# Patient Record
Sex: FEMALE | Race: WHITE | Employment: OTHER | URBAN - METROPOLITAN AREA
[De-identification: names, ages, dates, MRNs, and addresses within clinical notes are randomized per-mention and may not be internally consistent; named-entity substitution may affect disease eponyms.]

---

## 2020-07-08 ENCOUNTER — HOSPITAL ENCOUNTER (OUTPATIENT)
Dept: MRI IMAGING | Age: 75
Discharge: HOME | End: 2020-07-08
Attending: UROLOGY
Payer: MEDICARE

## 2020-07-08 VITALS — WEIGHT: 175 LBS

## 2020-07-08 DIAGNOSIS — N28.89 RENAL MASS: ICD-10-CM

## 2020-07-08 PROCEDURE — 74011250636 HC RX REV CODE- 250/636: Performed by: RADIOLOGY

## 2020-07-08 PROCEDURE — 74183 MRI ABD W/O CNTR FLWD CNTR: CPT

## 2020-07-08 PROCEDURE — A9585 GADOBUTROL INJECTION: HCPCS | Performed by: RADIOLOGY

## 2020-07-08 PROCEDURE — 77030021566

## 2020-07-08 RX ORDER — GADOTERATE MEGLUMINE 376.9 MG/ML
15 INJECTION INTRAVENOUS
Status: DISCONTINUED | OUTPATIENT
Start: 2020-07-08 | End: 2020-07-08

## 2020-07-08 RX ADMIN — GADOBUTROL 7 ML: 604.72 INJECTION INTRAVENOUS at 13:30

## 2020-07-09 PROCEDURE — 77030021566

## 2024-08-24 ENCOUNTER — APPOINTMENT (OUTPATIENT)
Facility: HOSPITAL | Age: 79
End: 2024-08-24
Payer: MEDICARE

## 2024-08-24 ENCOUNTER — HOSPITAL ENCOUNTER (INPATIENT)
Facility: HOSPITAL | Age: 79
LOS: 13 days | Discharge: NURSING, REHAB OR LONG-TERM CARE | End: 2024-09-06
Attending: EMERGENCY MEDICINE | Admitting: HOSPITALIST
Payer: MEDICARE

## 2024-08-24 DIAGNOSIS — I48.92 ATRIAL FLUTTER, UNSPECIFIED TYPE (HCC): ICD-10-CM

## 2024-08-24 DIAGNOSIS — S72.002A CLOSED FRACTURE OF NECK OF LEFT FEMUR, INITIAL ENCOUNTER (HCC): Primary | ICD-10-CM

## 2024-08-24 DIAGNOSIS — I48.0 PAROXYSMAL ATRIAL FIBRILLATION (HCC): ICD-10-CM

## 2024-08-24 LAB
ANION GAP SERPL CALC-SCNC: 7 MMOL/L (ref 5–15)
BASOPHILS # BLD: 0 K/UL (ref 0–0.1)
BASOPHILS NFR BLD: 0 % (ref 0–1)
BUN SERPL-MCNC: 22 MG/DL (ref 6–20)
BUN/CREAT SERPL: 21 (ref 12–20)
CALCIUM SERPL-MCNC: 10 MG/DL (ref 8.5–10.1)
CHLORIDE SERPL-SCNC: 106 MMOL/L (ref 97–108)
CO2 SERPL-SCNC: 24 MMOL/L (ref 21–32)
CREAT SERPL-MCNC: 1.04 MG/DL (ref 0.55–1.02)
DIFFERENTIAL METHOD BLD: ABNORMAL
EOSINOPHIL # BLD: 0.1 K/UL (ref 0–0.4)
EOSINOPHIL NFR BLD: 1 % (ref 0–7)
ERYTHROCYTE [DISTWIDTH] IN BLOOD BY AUTOMATED COUNT: 13.2 % (ref 11.5–14.5)
GLUCOSE SERPL-MCNC: 230 MG/DL (ref 65–100)
HCT VFR BLD AUTO: 41.8 % (ref 35–47)
HGB BLD-MCNC: 13.6 G/DL (ref 11.5–16)
IMM GRANULOCYTES # BLD AUTO: 0.1 K/UL (ref 0–0.04)
IMM GRANULOCYTES NFR BLD AUTO: 1 % (ref 0–0.5)
LYMPHOCYTES # BLD: 0.8 K/UL (ref 0.8–3.5)
LYMPHOCYTES NFR BLD: 6 % (ref 12–49)
MCH RBC QN AUTO: 30.7 PG (ref 26–34)
MCHC RBC AUTO-ENTMCNC: 32.5 G/DL (ref 30–36.5)
MCV RBC AUTO: 94.4 FL (ref 80–99)
MONOCYTES # BLD: 0.7 K/UL (ref 0–1)
MONOCYTES NFR BLD: 5 % (ref 5–13)
NEUTS SEG # BLD: 11.6 K/UL (ref 1.8–8)
NEUTS SEG NFR BLD: 87 % (ref 32–75)
NRBC # BLD: 0 K/UL (ref 0–0.01)
NRBC BLD-RTO: 0 PER 100 WBC
PLATELET # BLD AUTO: 229 K/UL (ref 150–400)
PMV BLD AUTO: 12.1 FL (ref 8.9–12.9)
POTASSIUM SERPL-SCNC: 4.5 MMOL/L (ref 3.5–5.1)
RBC # BLD AUTO: 4.43 M/UL (ref 3.8–5.2)
RBC MORPH BLD: ABNORMAL
SODIUM SERPL-SCNC: 137 MMOL/L (ref 136–145)
WBC # BLD AUTO: 13.3 K/UL (ref 3.6–11)

## 2024-08-24 PROCEDURE — 1100000000 HC RM PRIVATE

## 2024-08-24 PROCEDURE — 71045 X-RAY EXAM CHEST 1 VIEW: CPT

## 2024-08-24 PROCEDURE — APPNB30 APP NON BILLABLE TIME 0-30 MINS: Performed by: PHYSICIAN ASSISTANT

## 2024-08-24 PROCEDURE — 86901 BLOOD TYPING SEROLOGIC RH(D): CPT

## 2024-08-24 PROCEDURE — 99285 EMERGENCY DEPT VISIT HI MDM: CPT

## 2024-08-24 PROCEDURE — 86900 BLOOD TYPING SEROLOGIC ABO: CPT

## 2024-08-24 PROCEDURE — 6370000000 HC RX 637 (ALT 250 FOR IP): Performed by: EMERGENCY MEDICINE

## 2024-08-24 PROCEDURE — 2580000003 HC RX 258: Performed by: EMERGENCY MEDICINE

## 2024-08-24 PROCEDURE — 73502 X-RAY EXAM HIP UNI 2-3 VIEWS: CPT

## 2024-08-24 PROCEDURE — 85025 COMPLETE CBC W/AUTO DIFF WBC: CPT

## 2024-08-24 PROCEDURE — 73552 X-RAY EXAM OF FEMUR 2/>: CPT

## 2024-08-24 PROCEDURE — 36415 COLL VENOUS BLD VENIPUNCTURE: CPT

## 2024-08-24 PROCEDURE — 80048 BASIC METABOLIC PNL TOTAL CA: CPT

## 2024-08-24 PROCEDURE — 86850 RBC ANTIBODY SCREEN: CPT

## 2024-08-24 RX ORDER — POLYETHYLENE GLYCOL 3350 17 G/17G
17 POWDER, FOR SOLUTION ORAL DAILY PRN
Status: DISCONTINUED | OUTPATIENT
Start: 2024-08-24 | End: 2024-09-06 | Stop reason: HOSPADM

## 2024-08-24 RX ORDER — ONDANSETRON 2 MG/ML
4 INJECTION INTRAMUSCULAR; INTRAVENOUS EVERY 6 HOURS PRN
Status: DISCONTINUED | OUTPATIENT
Start: 2024-08-24 | End: 2024-08-28

## 2024-08-24 RX ORDER — SODIUM CHLORIDE 9 MG/ML
INJECTION, SOLUTION INTRAVENOUS PRN
Status: DISCONTINUED | OUTPATIENT
Start: 2024-08-24 | End: 2024-09-06 | Stop reason: HOSPADM

## 2024-08-24 RX ORDER — SODIUM CHLORIDE 0.9 % (FLUSH) 0.9 %
5-40 SYRINGE (ML) INJECTION EVERY 12 HOURS SCHEDULED
Status: DISCONTINUED | OUTPATIENT
Start: 2024-08-24 | End: 2024-09-06 | Stop reason: HOSPADM

## 2024-08-24 RX ORDER — MAGNESIUM SULFATE IN WATER 40 MG/ML
2000 INJECTION, SOLUTION INTRAVENOUS PRN
Status: DISCONTINUED | OUTPATIENT
Start: 2024-08-24 | End: 2024-08-30

## 2024-08-24 RX ORDER — OXYCODONE HYDROCHLORIDE 5 MG/1
5 TABLET ORAL EVERY 4 HOURS PRN
Status: DISCONTINUED | OUTPATIENT
Start: 2024-08-24 | End: 2024-08-25

## 2024-08-24 RX ORDER — ONDANSETRON 4 MG/1
4 TABLET, ORALLY DISINTEGRATING ORAL EVERY 8 HOURS PRN
Status: DISCONTINUED | OUTPATIENT
Start: 2024-08-24 | End: 2024-08-28

## 2024-08-24 RX ORDER — OXYCODONE HYDROCHLORIDE 10 MG/1
10 TABLET ORAL EVERY 4 HOURS PRN
Status: DISCONTINUED | OUTPATIENT
Start: 2024-08-24 | End: 2024-08-25

## 2024-08-24 RX ORDER — POTASSIUM CHLORIDE 7.45 MG/ML
10 INJECTION INTRAVENOUS PRN
Status: DISCONTINUED | OUTPATIENT
Start: 2024-08-24 | End: 2024-08-30

## 2024-08-24 RX ORDER — HYDROCODONE BITARTRATE AND ACETAMINOPHEN 5; 325 MG/1; MG/1
1 TABLET ORAL
Status: COMPLETED | OUTPATIENT
Start: 2024-08-24 | End: 2024-08-24

## 2024-08-24 RX ORDER — 0.9 % SODIUM CHLORIDE 0.9 %
1000 INTRAVENOUS SOLUTION INTRAVENOUS ONCE
Status: COMPLETED | OUTPATIENT
Start: 2024-08-24 | End: 2024-08-25

## 2024-08-24 RX ORDER — POTASSIUM CHLORIDE 750 MG/1
40 TABLET, EXTENDED RELEASE ORAL PRN
Status: DISCONTINUED | OUTPATIENT
Start: 2024-08-24 | End: 2024-08-30

## 2024-08-24 RX ORDER — SODIUM CHLORIDE 0.9 % (FLUSH) 0.9 %
5-40 SYRINGE (ML) INJECTION PRN
Status: DISCONTINUED | OUTPATIENT
Start: 2024-08-24 | End: 2024-09-06 | Stop reason: HOSPADM

## 2024-08-24 RX ADMIN — SODIUM CHLORIDE 1000 ML: 9 INJECTION, SOLUTION INTRAVENOUS at 23:48

## 2024-08-24 RX ADMIN — HYDROCODONE BITARTRATE AND ACETAMINOPHEN 1 TABLET: 5; 325 TABLET ORAL at 20:32

## 2024-08-24 ASSESSMENT — PAIN SCALES - GENERAL: PAINLEVEL_OUTOF10: 4

## 2024-08-24 ASSESSMENT — PAIN DESCRIPTION - DESCRIPTORS: DESCRIPTORS: ACHING

## 2024-08-24 ASSESSMENT — PAIN - FUNCTIONAL ASSESSMENT: PAIN_FUNCTIONAL_ASSESSMENT: 0-10

## 2024-08-24 ASSESSMENT — PAIN DESCRIPTION - LOCATION: LOCATION: HIP

## 2024-08-24 ASSESSMENT — PAIN DESCRIPTION - ORIENTATION: ORIENTATION: LEFT

## 2024-08-24 NOTE — ED TRIAGE NOTES
Pt in via ems due to having a fall at the grocery store that occurred about an hour ago. She denies losing consciousness or hitting her head. Pt does taking  endorse taking eliquis for a-fib.    Complains of left hip pain

## 2024-08-25 ENCOUNTER — APPOINTMENT (OUTPATIENT)
Facility: HOSPITAL | Age: 79
End: 2024-08-25
Attending: HOSPITALIST
Payer: MEDICARE

## 2024-08-25 LAB
ABO + RH BLD: NORMAL
APPEARANCE UR: ABNORMAL
BACTERIA URNS QL MICRO: NEGATIVE /HPF
BILIRUB UR QL: NEGATIVE
BLOOD GROUP ANTIBODIES SERPL: NORMAL
COLOR UR: ABNORMAL
EKG ATRIAL RATE: 97 BPM
EKG DIAGNOSIS: NORMAL
EKG P AXIS: 43 DEGREES
EKG P-R INTERVAL: 154 MS
EKG Q-T INTERVAL: 380 MS
EKG QRS DURATION: 82 MS
EKG QTC CALCULATION (BAZETT): 482 MS
EKG R AXIS: 10 DEGREES
EKG T AXIS: 39 DEGREES
EKG VENTRICULAR RATE: 97 BPM
EPITH CASTS URNS QL MICRO: ABNORMAL /LPF
EST. AVERAGE GLUCOSE BLD GHB EST-MCNC: 137 MG/DL
GLUCOSE BLD STRIP.AUTO-MCNC: 122 MG/DL (ref 65–117)
GLUCOSE BLD STRIP.AUTO-MCNC: 142 MG/DL (ref 65–117)
GLUCOSE BLD STRIP.AUTO-MCNC: 145 MG/DL (ref 65–117)
GLUCOSE BLD STRIP.AUTO-MCNC: 174 MG/DL (ref 65–117)
GLUCOSE UR STRIP.AUTO-MCNC: >1000 MG/DL
HBA1C MFR BLD: 6.4 % (ref 4–5.6)
HGB UR QL STRIP: ABNORMAL
HYALINE CASTS URNS QL MICRO: ABNORMAL /LPF (ref 0–2)
KETONES UR QL STRIP.AUTO: NEGATIVE MG/DL
LEUKOCYTE ESTERASE UR QL STRIP.AUTO: NEGATIVE
NITRITE UR QL STRIP.AUTO: NEGATIVE
PH UR STRIP: 7.5 (ref 5–8)
PROT UR STRIP-MCNC: 100 MG/DL
RBC #/AREA URNS HPF: ABNORMAL /HPF (ref 0–5)
SERVICE CMNT-IMP: ABNORMAL
SP GR UR REFRACTOMETRY: 1.02 (ref 1–1.03)
SPECIMEN EXP DATE BLD: NORMAL
URINE CULTURE IF INDICATED: ABNORMAL
UROBILINOGEN UR QL STRIP.AUTO: 0.2 EU/DL (ref 0.2–1)
WBC URNS QL MICRO: ABNORMAL /HPF (ref 0–4)

## 2024-08-25 PROCEDURE — 81001 URINALYSIS AUTO W/SCOPE: CPT

## 2024-08-25 PROCEDURE — 6370000000 HC RX 637 (ALT 250 FOR IP): Performed by: HOSPITALIST

## 2024-08-25 PROCEDURE — 93306 TTE W/DOPPLER COMPLETE: CPT

## 2024-08-25 PROCEDURE — 99223 1ST HOSP IP/OBS HIGH 75: CPT | Performed by: INTERNAL MEDICINE

## 2024-08-25 PROCEDURE — 94761 N-INVAS EAR/PLS OXIMETRY MLT: CPT

## 2024-08-25 PROCEDURE — 2580000003 HC RX 258: Performed by: HOSPITALIST

## 2024-08-25 PROCEDURE — 1100000000 HC RM PRIVATE

## 2024-08-25 PROCEDURE — 36415 COLL VENOUS BLD VENIPUNCTURE: CPT

## 2024-08-25 PROCEDURE — 93010 ELECTROCARDIOGRAM REPORT: CPT | Performed by: INTERNAL MEDICINE

## 2024-08-25 PROCEDURE — 93005 ELECTROCARDIOGRAM TRACING: CPT | Performed by: PHYSICIAN ASSISTANT

## 2024-08-25 PROCEDURE — 83036 HEMOGLOBIN GLYCOSYLATED A1C: CPT

## 2024-08-25 PROCEDURE — 82962 GLUCOSE BLOOD TEST: CPT

## 2024-08-25 PROCEDURE — 6360000002 HC RX W HCPCS: Performed by: PHYSICIAN ASSISTANT

## 2024-08-25 PROCEDURE — 2580000003 HC RX 258: Performed by: PHYSICIAN ASSISTANT

## 2024-08-25 RX ORDER — DILTIAZEM HYDROCHLORIDE 240 MG/1
240 CAPSULE, COATED, EXTENDED RELEASE ORAL DAILY
Status: DISCONTINUED | OUTPATIENT
Start: 2024-08-25 | End: 2024-08-25

## 2024-08-25 RX ORDER — AMLODIPINE BESYLATE 5 MG/1
5 TABLET ORAL DAILY
Status: DISCONTINUED | OUTPATIENT
Start: 2024-08-25 | End: 2024-08-29

## 2024-08-25 RX ORDER — FLECAINIDE ACETATE 50 MG/1
50 TABLET ORAL 2 TIMES DAILY
Status: DISCONTINUED | OUTPATIENT
Start: 2024-08-25 | End: 2024-08-30

## 2024-08-25 RX ORDER — SODIUM CHLORIDE 9 MG/ML
INJECTION, SOLUTION INTRAVENOUS CONTINUOUS
Status: DISPENSED | OUTPATIENT
Start: 2024-08-25 | End: 2024-08-26

## 2024-08-25 RX ORDER — HYDROCODONE BITARTRATE AND ACETAMINOPHEN 5; 325 MG/1; MG/1
1 TABLET ORAL EVERY 6 HOURS PRN
Status: DISCONTINUED | OUTPATIENT
Start: 2024-08-25 | End: 2024-08-25

## 2024-08-25 RX ORDER — LISINOPRIL 20 MG/1
40 TABLET ORAL DAILY
Status: DISCONTINUED | OUTPATIENT
Start: 2024-08-25 | End: 2024-08-30

## 2024-08-25 RX ORDER — DILTIAZEM HYDROCHLORIDE 60 MG/1
240 CAPSULE, EXTENDED RELEASE ORAL DAILY
Status: DISCONTINUED | OUTPATIENT
Start: 2024-08-25 | End: 2024-08-25

## 2024-08-25 RX ORDER — INSULIN GLARGINE 100 [IU]/ML
0.15 INJECTION, SOLUTION SUBCUTANEOUS DAILY
Status: DISCONTINUED | OUTPATIENT
Start: 2024-08-25 | End: 2024-08-27

## 2024-08-25 RX ORDER — DEXTROSE MONOHYDRATE 100 MG/ML
INJECTION, SOLUTION INTRAVENOUS CONTINUOUS PRN
Status: DISCONTINUED | OUTPATIENT
Start: 2024-08-25 | End: 2024-09-06 | Stop reason: HOSPADM

## 2024-08-25 RX ORDER — AMLODIPINE BESYLATE 5 MG/1
5 TABLET ORAL DAILY
Status: ON HOLD | COMMUNITY
End: 2024-09-06 | Stop reason: HOSPADM

## 2024-08-25 RX ORDER — OXYCODONE HYDROCHLORIDE 5 MG/1
5 TABLET ORAL EVERY 6 HOURS PRN
Status: DISCONTINUED | OUTPATIENT
Start: 2024-08-25 | End: 2024-08-28

## 2024-08-25 RX ORDER — FLECAINIDE ACETATE 50 MG/1
50 TABLET ORAL 2 TIMES DAILY
Status: ON HOLD | COMMUNITY
End: 2024-09-06 | Stop reason: HOSPADM

## 2024-08-25 RX ORDER — LISINOPRIL 40 MG/1
40 TABLET ORAL DAILY
Status: ON HOLD | COMMUNITY
End: 2024-09-06 | Stop reason: HOSPADM

## 2024-08-25 RX ORDER — OXYCODONE HYDROCHLORIDE 5 MG/1
10 TABLET ORAL EVERY 6 HOURS PRN
Status: DISCONTINUED | OUTPATIENT
Start: 2024-08-25 | End: 2024-08-28

## 2024-08-25 RX ORDER — DILTIAZEM HYDROCHLORIDE 240 MG/1
240 CAPSULE, EXTENDED RELEASE ORAL DAILY
Status: ON HOLD | COMMUNITY
End: 2024-09-06 | Stop reason: HOSPADM

## 2024-08-25 RX ORDER — DILTIAZEM HYDROCHLORIDE 180 MG/1
360 CAPSULE, COATED, EXTENDED RELEASE ORAL DAILY
Status: DISCONTINUED | OUTPATIENT
Start: 2024-08-26 | End: 2024-09-04

## 2024-08-25 RX ORDER — SENNA AND DOCUSATE SODIUM 50; 8.6 MG/1; MG/1
1 TABLET, FILM COATED ORAL DAILY
Status: DISCONTINUED | OUTPATIENT
Start: 2024-08-25 | End: 2024-08-31

## 2024-08-25 RX ORDER — HYDRALAZINE HYDROCHLORIDE 20 MG/ML
10 INJECTION INTRAMUSCULAR; INTRAVENOUS EVERY 6 HOURS PRN
Status: DISCONTINUED | OUTPATIENT
Start: 2024-08-25 | End: 2024-09-06 | Stop reason: HOSPADM

## 2024-08-25 RX ADMIN — SENNOSIDES AND DOCUSATE SODIUM 1 TABLET: 50; 8.6 TABLET ORAL at 07:51

## 2024-08-25 RX ADMIN — SODIUM CHLORIDE, PRESERVATIVE FREE 5 ML: 5 INJECTION INTRAVENOUS at 20:54

## 2024-08-25 RX ADMIN — FLECAINIDE ACETATE 50 MG: 100 TABLET ORAL at 07:52

## 2024-08-25 RX ADMIN — AMLODIPINE BESYLATE 5 MG: 5 TABLET ORAL at 07:52

## 2024-08-25 RX ADMIN — HYDROCODONE BITARTRATE AND ACETAMINOPHEN 1 TABLET: 5; 325 TABLET ORAL at 02:41

## 2024-08-25 RX ADMIN — OXYCODONE HYDROCHLORIDE 10 MG: 10 TABLET ORAL at 16:03

## 2024-08-25 RX ADMIN — FLECAINIDE ACETATE 50 MG: 100 TABLET ORAL at 20:53

## 2024-08-25 RX ADMIN — SODIUM CHLORIDE: 9 INJECTION, SOLUTION INTRAVENOUS at 03:24

## 2024-08-25 RX ADMIN — LISINOPRIL 40 MG: 20 TABLET ORAL at 07:51

## 2024-08-25 RX ADMIN — SODIUM CHLORIDE: 9 INJECTION, SOLUTION INTRAVENOUS at 16:09

## 2024-08-25 RX ADMIN — OXYCODONE HYDROCHLORIDE 5 MG: 5 TABLET ORAL at 10:42

## 2024-08-25 RX ADMIN — SODIUM CHLORIDE: 9 INJECTION, SOLUTION INTRAVENOUS at 06:24

## 2024-08-25 RX ADMIN — INSULIN GLARGINE 11 UNITS: 100 INJECTION, SOLUTION SUBCUTANEOUS at 08:05

## 2024-08-25 ASSESSMENT — PAIN SCALES - GENERAL
PAINLEVEL_OUTOF10: 7
PAINLEVEL_OUTOF10: 7
PAINLEVEL_OUTOF10: 0
PAINLEVEL_OUTOF10: 3
PAINLEVEL_OUTOF10: 6
PAINLEVEL_OUTOF10: 7

## 2024-08-25 ASSESSMENT — PAIN - FUNCTIONAL ASSESSMENT
PAIN_FUNCTIONAL_ASSESSMENT: PREVENTS OR INTERFERES WITH ALL ACTIVE AND SOME PASSIVE ACTIVITIES
PAIN_FUNCTIONAL_ASSESSMENT: PREVENTS OR INTERFERES WITH ALL ACTIVE AND SOME PASSIVE ACTIVITIES

## 2024-08-25 ASSESSMENT — PAIN DESCRIPTION - LOCATION
LOCATION: LEG;HIP
LOCATION: HIP
LOCATION: HIP
LOCATION: HIP;LEG

## 2024-08-25 ASSESSMENT — PAIN DESCRIPTION - DESCRIPTORS
DESCRIPTORS: ACHING
DESCRIPTORS: ACHING

## 2024-08-25 ASSESSMENT — PAIN DESCRIPTION - ORIENTATION
ORIENTATION: LEFT

## 2024-08-25 NOTE — H&P
Hospitalist Admission Note    NAME: Kenyatta Santana   :  1945   MRN:  [id number]     Date/Time:  2024 2:35 AM    Patient PCP: No primary care provider on file.    Please note that this dictation was completed with Vee24, the computer voice recognition software.  Quite often unanticipated grammatical, syntax, homophones, and other interpretive errors are inadvertently transcribed by the computer software.  Please disregard these errors.  Please excuse any errors that have escaped final proofreading  ________________________________________________________________________    Given the patient's current clinical presentation, I have a high level of concern for decompensation if discharged from the emergency department.  Complex decision making was performed, which includes reviewing the patient's available past medical records, laboratory results, and x-ray films.       My assessment of this patient's clinical condition and my plan of care is as follows.    Assessment / Plan:    Left femoral neck fracture, POA mechanical fall  --would hold off surgery until 48 hours off eliquis  --check preop echo and cardiology consult for clearance given hx afib.  Report stress test 1-2 months ago with VCS normal  --oxycodone, dilaudid prn, tylenol prn.  Add pericolace    P. Afib  --hold eliquis, last dose 9am on , for surgery  --cont flecainide, diltiazem    HTN  --continue lisinopril, amlodipine    Probable DM type 2 undiagnosed  with glucosuria  --check A1c.  Put on low dose lantus 11 units daily for now.  Can switch to PO med prior to discharge      Medical Decision Making:   I have personally reviewed the radiographs, laboratory data in Epic and decisions and statements above are based partially on this personal interpretation.    Drug monitoring:       Code Status: Full Code for now.  She likely would want to be DNR/DNI but wants to dw with her daughter first  DVT Prophylaxis: SCD's  GI Prophylaxis: not   Good insight, oriented to person, place and time, alert          _______________________________________________________________________  Care Plan discussed with:    Comments   Patient y Discussed with patient in room. POC outlined and Questions answered    Family      RN x    Care Manager                    Consultant:  ahsan ED MD   _______________________________________________________________________  Recommended Disposition: SNF  Home with Family    HH/PT/OT/RN    SNF/LTC    ALIRIO    ________________________________________________________________________  TOTAL TIME:  60 Minutes        Comments   >50% of visit spent in counseling and coordination of care  Chart reviewed  Discussion with patient and/or family and questions answered     LAB DATA REVIEWED:    Recent Results (from the past 12 hour(s))   CBC with Auto Differential    Collection Time: 08/24/24  9:00 PM   Result Value Ref Range    WBC 13.3 (H) 3.6 - 11.0 K/uL    RBC 4.43 3.80 - 5.20 M/uL    Hemoglobin 13.6 11.5 - 16.0 g/dL    Hematocrit 41.8 35.0 - 47.0 %    MCV 94.4 80.0 - 99.0 FL    MCH 30.7 26.0 - 34.0 PG    MCHC 32.5 30.0 - 36.5 g/dL    RDW 13.2 11.5 - 14.5 %    Platelets 229 150 - 400 K/uL    MPV 12.1 8.9 - 12.9 FL    Nucleated RBCs 0.0 0  WBC    nRBC 0.00 0.00 - 0.01 K/uL    Neutrophils % 87 (H) 32 - 75 %    Lymphocytes % 6 (L) 12 - 49 %    Monocytes % 5 5 - 13 %    Eosinophils % 1 0 - 7 %    Basophils % 0 0 - 1 %    Immature Granulocytes % 1 (H) 0.0 - 0.5 %    Neutrophils Absolute 11.6 (H) 1.8 - 8.0 K/UL    Lymphocytes Absolute 0.8 0.8 - 3.5 K/UL    Monocytes Absolute 0.7 0.0 - 1.0 K/UL    Eosinophils Absolute 0.1 0.0 - 0.4 K/UL    Basophils Absolute 0.0 0.0 - 0.1 K/UL    Immature Granulocytes Absolute 0.1 (H) 0.00 - 0.04 K/UL    Differential Type SMEAR SCANNED      RBC Comment NORMOCYTIC, NORMOCHROMIC     BMP    Collection Time: 08/24/24  9:00 PM   Result Value Ref Range    Sodium 137 136 - 145 mmol/L    Potassium 4.5 3.5 - 5.1 mmol/L

## 2024-08-25 NOTE — PROGRESS NOTES
Orthopedics is aware of the patient.  Patient with a ground level fall and now has a displaced left femoral neck fracture.  She is on eliquis with last dose this morning.  She will need this held prior to surgery.  Plan would be for left hip hemiarthroplasty once cleared by medical team and off of eliquis for 24-48 hours.  Once we have clearances, then we can plan for OR time.  Preoperative labs have been ordered.  Patient is bedrest.    Full note in the am.      Caly Chappell PA-C  Orthopaedic Surgery PA  [facility] [place]  [facility]

## 2024-08-25 NOTE — ED PROVIDER NOTES
Notable for the following components:       Result Value    WBC 13.3 (*)     Neutrophils % 87 (*)     Lymphocytes % 6 (*)     Immature Granulocytes % 1 (*)     Neutrophils Absolute 11.6 (*)     Immature Granulocytes Absolute 0.1 (*)     All other components within normal limits   BASIC METABOLIC PANEL   URINALYSIS WITH REFLEX TO CULTURE   TYPE AND SCREEN       All other labs were within normal range or not returned as of this dictation.    EMERGENCY DEPARTMENT COURSE and DIFFERENTIAL DIAGNOSIS/MDM:   Vitals:    Vitals:    08/24/24 1956   BP: (!) 147/40   Pulse: 81   Resp: 15   Temp: 98.1 °F (36.7 °C)   TempSrc: Oral   SpO2: 99%   Weight: 72.6 kg (160 lb)   Height: 1.702 m (5' 7\")           Medical Decision Making  Amount and/or Complexity of Data Reviewed  Labs: ordered.  Radiology: ordered.    Risk  Prescription drug management.  Decision regarding hospitalization.    9:00 PM  X-ray dependently visualized and interpreted by me demonstrated a left femoral neck fracture, these findings were communicated with orthopedic surgery who agreed with holding the patient's Eliquis and recommended admission to the hospitalist service for future surgical management.    Patient is being admitted to the hospital.  The results of their tests and reasons for their admission have been discussed with them and/or available family.  They convey agreement and understanding for the need to be admitted and for their admission diagnosis.  Consultation will be made now with the inpatient physician for hospitalization.        REASSESSMENT            CONSULTS:  None    PROCEDURES:  Unless otherwise noted below, none     Procedures      FINAL IMPRESSION      1. Closed fracture of neck of left femur, initial encounter (HCC)          DISPOSITION/PLAN   DISPOSITION Decision To Admit 08/24/2024 10:12:42 PM    Perfect Serve Consult for Admission  10:15 PM    ED Room Number: ER13/13  Patient Name and age:  Kenyatta Santana 78 y.o.  female  Working

## 2024-08-25 NOTE — CONSULTS
ORTHOPAEDIC CONSULT NOTE    Subjective:     Date of Consultation:  August 25, 2024      Kenyatta Santana is a 78 y.o. female with PMH of a fib, DM, HTN who is being seen for L hip pain s/p a trip and fall last PM. Work up has reveled a L femoral neck fracture. Pt lives at home. Currently pt takes eliquis with her last dose yesterday morning. Pt has been seen by cardiology who has cleared her for surgery.     Patient Active Problem List    Diagnosis Date Noted    Closed left hip fracture, initial encounter ([facility]) 08/24/2024     No family history on file.   Social History     Tobacco Use    Smoking status: Never    Smokeless tobacco: Never   Substance Use Topics    Alcohol use: Never     Past Medical History:   Diagnosis Date    Atrial fibrillation ([facility])     VCS Bouchra Still and [facility]    Hypertension     Non-smoker       Past Surgical History:   Procedure Laterality Date    ELBOW SURGERY Left 2004      Prior to Admission medications    Medication Sig Start Date End Date Taking? Authorizing Provider   apixaban (ELIQUIS) 5 MG TABS tablet Take 1 tablet by mouth 2 times daily   Yes ProviderSil MD   lisinopril (PRINIVIL;ZESTRIL) 40 MG tablet Take 1 tablet by mouth daily   Yes ProviderSil MD   amLODIPine (NORVASC) 5 MG tablet Take 1 tablet by mouth daily   Yes ProviderSil MD   dilTIAZem (DILACOR XR) 240 MG extended release capsule Take 1 capsule by mouth daily   Yes ProviderSil MD   flecainide (TAMBOCOR) 50 MG tablet Take 1 tablet by mouth 2 times daily   Yes ProviderSil MD     Current Facility-Administered Medications   Medication Dose Route Frequency    amLODIPine (NORVASC) tablet 5 mg  5 mg Oral Daily    flecainide (TAMBOCOR) tablet 50 mg  50 mg Oral BID    lisinopril (PRINIVIL;ZESTRIL) tablet 40 mg  40 mg Oral Daily    0.9 % sodium chloride infusion   IntraVENous Continuous    glucose chewable tablet 16 g  4 tablet Oral PRN    dextrose bolus 10% 125 mL  125 mL IntraVENous

## 2024-08-25 NOTE — ED NOTES
TRANSFER - OUT REPORT:    Verbal report given to Ana RN on Kenyatta Santana  being transferred to 5th floor  for routine progression of patient care       Report consisted of patient's Situation, Background, Assessment and   Recommendations(SBAR).     Information from the following report(s) Nurse Handoff Report, ED Encounter Summary, and ED SBAR was reviewed with the receiving nurse.    [place] Fall Assessment:    Presents to emergency department  because of falls (Syncope, seizure, or loss of consciousness): Yes  Age > 70: Yes  Altered Mental Status, Intoxication with alcohol or substance confusion (Disorientation, impaired judgment, poor safety awaremess, or inability to follow instructions): No  Impaired Mobility: Ambulates or transfers with assistive devices or assistance; Unable to ambulate or transer.: No             Lines:   Peripheral IV 08/24/24 Right Antecubital (Active)   Site Assessment Clean, dry & intact 08/24/24 2107   Line Status Flushed;Specimen collected;Blood return noted 08/24/24 2107   Line Care Connections checked and tightened 08/24/24 2107   Phlebitis Assessment No symptoms 08/24/24 2107   Infiltration Assessment 0 08/24/24 2107   Dressing Status Clean, dry & intact;New dressing applied 08/24/24 2107   Dressing Type Transparent 08/24/24 2107   Dressing Intervention New 08/24/24 2107        Opportunity for questions and clarification was provided.      Patient transported with:  Registered Nurse

## 2024-08-25 NOTE — CONSULTS
[facility] CARDIOLOGY  Cardiology Note     [x]Initial Encounter     []Follow-up    Patient Name: Kenyatta Santana - :1945 - MRN:[id number]  Primary Cardiologist: WILLIAM ([facility]-Cardiology/Renita-EP)  Consulting Cardiologist: Nelia Chambers MD, [facility], [facility]     Reason for consult:  Preop evaluation    HPI:  78-year-old woman with a history of paroxysmal atrial fibrillation followed by S, hypertension presenting following mechanical fall after tripping over carpet in the store and landed on her hip.  She was found to have left femoral neck fracture.  Cardiology was consulted regarding the management of preop evaluation prior to surgery.  Prior to the accident, she has had no functional limitations.  She can walk up a flight of stairs without any shortness of breath or chest pain.  Denies any exertional chest pain.  She has remained in normal sinus rhythm with rhythm control with flecainide and diltiazem.  Denies of any orthopnea, PND or lower extremity edema.  Per her report she had a stress test done 1 to 2 months ago to assess safety of flecainide therapy which was negative with S.    SUBJECTIVE:  Denies of any chest pain, shortness of breath, palpitations     Assessment and Plan     Cardiac preoperative evaluation  Patient is followed closely at [place] [facility].  Had a negative stress test 1 to 2 months ago to assess safety of flecainide therapy.  As she remains on flecainide therapy and closely followed by cardiology, likely prior echocardiogram is also within normal limits  - She has no functional limitations can walk up a flight of stairs without any shortness of breath.  No exertional chest pain or signs of angina.  - From rhythm perspective EKG demonstrated normal sinus rhythm with PACs, no ischemic changes and remains in normal sinus rhythm.  -  The patient has no active cardiac conditions, which would require further work-up. The patient has good (> 4METS) functional capacity  without symptoms. Therefore, in accordance with ACC/AHA 2007 Guidelines for perioperative evaluation, this patient would be a low cardiovascular risk for a moderate risk surgery.  -Eliquis on hold at least for 48 hours prior to surgery per Ortho's request  - Would recommend restarting Eliquis as soon as possible or appropriate per surgical standpoint  - Continue flecainide and diltiazem for rate and rhythm control  - No limitation to proceed with surgery from cardiac perspective    Left femoral neck fracture secondary to mechanical fall  - Plan for potential surgery tomorrow  - No limitation to proceed with surgery as noted above from cardiac perspective    Paroxysmal atrial fibrillation  No prior ablation  - Rhythm control on flecainide therapy, continue 50 mg twice daily  - Rate control on diltiazem, continue to 40 mg daily  - Restart Eliquis once cleared from surgery standpoint    High Risk Medication Use  On Flecainide  - negative stress test 1-2 months ago with VCS  - on concurrent CCB  - EKG stable for ongoing use    Hypertension  Continue lisinopril, amlodipine, diltiazem    Potential type 2 diabetes  - A1c 6.4  - Management per hospitalist team    Cardiology will sign off at this time, patient would benefit from outpatient follow-up with [place] [facility] postdischarge.  Please do not hesitate to contact us if you have any further questions or concerns.        _________________________________  An Serafin Chambers MD, [facility], [facility]  Cardiac Electrophysiology  [facility] [place]         ____________________________________________________________    Cardiac testing  No results found for this or any previous visit.    No results found for this or any previous visit.    No results found for this or any previous visit.      Most recent HS troponins:  No results for input(s): \"TROPHS\", \"CKMB\" in the last 72 hours.    ECG: sinus rhythm, rate of 97 bpm with PACs, no ischemic

## 2024-08-26 ENCOUNTER — ANESTHESIA (OUTPATIENT)
Facility: HOSPITAL | Age: 79
End: 2024-08-26
Payer: MEDICARE

## 2024-08-26 ENCOUNTER — ANESTHESIA EVENT (OUTPATIENT)
Facility: HOSPITAL | Age: 79
End: 2024-08-26
Payer: MEDICARE

## 2024-08-26 ENCOUNTER — APPOINTMENT (OUTPATIENT)
Facility: HOSPITAL | Age: 79
End: 2024-08-26
Payer: MEDICARE

## 2024-08-26 PROBLEM — S72.002A DISPLACED FRACTURE OF LEFT FEMORAL NECK (HCC): Status: ACTIVE | Noted: 2024-08-26

## 2024-08-26 LAB
ANION GAP SERPL CALC-SCNC: 5 MMOL/L (ref 5–15)
BUN SERPL-MCNC: 13 MG/DL (ref 6–20)
BUN/CREAT SERPL: 15 (ref 12–20)
CALCIUM SERPL-MCNC: 9.3 MG/DL (ref 8.5–10.1)
CHLORIDE SERPL-SCNC: 111 MMOL/L (ref 97–108)
CO2 SERPL-SCNC: 24 MMOL/L (ref 21–32)
CREAT SERPL-MCNC: 0.86 MG/DL (ref 0.55–1.02)
ECHO AV MEAN GRADIENT: 12 MMHG
ECHO AV MEAN VELOCITY: 1.6 M/S
ECHO AV PEAK GRADIENT: 22 MMHG
ECHO AV PEAK VELOCITY: 2.3 M/S
ECHO AV VELOCITY RATIO: 0.52
ECHO AV VTI: 33.1 CM
ECHO BSA: 1.85 M2
ECHO EST RA PRESSURE: 3 MMHG
ECHO LA VOL A-L A4C: 25 ML (ref 22–52)
ECHO LA VOL MOD A4C: 24 ML (ref 22–52)
ECHO LA VOLUME INDEX A-L A4C: 14 ML/M2 (ref 16–34)
ECHO LA VOLUME INDEX MOD A4C: 13 ML/M2 (ref 16–34)
ECHO LV EDV A4C: 47 ML
ECHO LV EDV INDEX A4C: 26 ML/M2
ECHO LV EF PHYSICIAN: 55 %
ECHO LV EJECTION FRACTION A4C: 47 %
ECHO LV ESV A4C: 25 ML
ECHO LV ESV INDEX A4C: 14 ML/M2
ECHO LVOT AV VTI INDEX: 0.62
ECHO LVOT MEAN GRADIENT: 4 MMHG
ECHO LVOT PEAK GRADIENT: 6 MMHG
ECHO LVOT PEAK VELOCITY: 1.2 M/S
ECHO LVOT VTI: 20.6 CM
ECHO MV A VELOCITY: 0.81 M/S
ECHO MV E DECELERATION TIME (DT): 128.5 MS
ECHO MV E VELOCITY: 0.55 M/S
ECHO MV E/A RATIO: 0.68
ECHO MV REGURGITANT PEAK GRADIENT: 7 MMHG
ECHO MV REGURGITANT PEAK VELOCITY: 1.3 M/S
ECHO PV MAX VELOCITY: 1.5 M/S
ECHO PV MEAN GRADIENT: 6 MMHG
ECHO PV MEAN VELOCITY: 1.2 M/S
ECHO PV PEAK GRADIENT: 9 MMHG
ECHO RIGHT VENTRICULAR SYSTOLIC PRESSURE (RVSP): 21 MMHG
ECHO RV FREE WALL PEAK S': 28 CM/S
ECHO TV REGURGITANT MAX VELOCITY: 2.15 M/S
ECHO TV REGURGITANT PEAK GRADIENT: 19 MMHG
GLUCOSE BLD STRIP.AUTO-MCNC: 160 MG/DL (ref 65–117)
GLUCOSE SERPL-MCNC: 217 MG/DL (ref 65–100)
MAGNESIUM SERPL-MCNC: 2.1 MG/DL (ref 1.6–2.4)
POTASSIUM SERPL-SCNC: 4.5 MMOL/L (ref 3.5–5.1)
SERVICE CMNT-IMP: ABNORMAL
SODIUM SERPL-SCNC: 140 MMOL/L (ref 136–145)

## 2024-08-26 PROCEDURE — 3600000003 HC SURGERY LEVEL 3 BASE: Performed by: ORTHOPAEDIC SURGERY

## 2024-08-26 PROCEDURE — 6370000000 HC RX 637 (ALT 250 FOR IP): Performed by: HOSPITALIST

## 2024-08-26 PROCEDURE — 6360000002 HC RX W HCPCS: Performed by: PHYSICIAN ASSISTANT

## 2024-08-26 PROCEDURE — 2500000003 HC RX 250 WO HCPCS: Performed by: NURSE ANESTHETIST, CERTIFIED REGISTERED

## 2024-08-26 PROCEDURE — 6370000000 HC RX 637 (ALT 250 FOR IP): Performed by: ORTHOPAEDIC SURGERY

## 2024-08-26 PROCEDURE — 7100000001 HC PACU RECOVERY - ADDTL 15 MIN: Performed by: ORTHOPAEDIC SURGERY

## 2024-08-26 PROCEDURE — 2709999900 HC NON-CHARGEABLE SUPPLY: Performed by: ORTHOPAEDIC SURGERY

## 2024-08-26 PROCEDURE — 83735 ASSAY OF MAGNESIUM: CPT

## 2024-08-26 PROCEDURE — 2580000003 HC RX 258: Performed by: PHYSICIAN ASSISTANT

## 2024-08-26 PROCEDURE — 3700000001 HC ADD 15 MINUTES (ANESTHESIA): Performed by: ORTHOPAEDIC SURGERY

## 2024-08-26 PROCEDURE — 3700000000 HC ANESTHESIA ATTENDED CARE: Performed by: ORTHOPAEDIC SURGERY

## 2024-08-26 PROCEDURE — 93306 TTE W/DOPPLER COMPLETE: CPT | Performed by: SPECIALIST

## 2024-08-26 PROCEDURE — 82962 GLUCOSE BLOOD TEST: CPT

## 2024-08-26 PROCEDURE — 2580000003 HC RX 258: Performed by: NURSE ANESTHETIST, CERTIFIED REGISTERED

## 2024-08-26 PROCEDURE — 2580000003 HC RX 258: Performed by: HOSPITALIST

## 2024-08-26 PROCEDURE — 3600000013 HC SURGERY LEVEL 3 ADDTL 15MIN: Performed by: ORTHOPAEDIC SURGERY

## 2024-08-26 PROCEDURE — 0SRS019 REPLACEMENT OF LEFT HIP JOINT, FEMORAL SURFACE WITH METAL SYNTHETIC SUBSTITUTE, CEMENTED, OPEN APPROACH: ICD-10-PCS | Performed by: ORTHOPAEDIC SURGERY

## 2024-08-26 PROCEDURE — 72170 X-RAY EXAM OF PELVIS: CPT

## 2024-08-26 PROCEDURE — 80048 BASIC METABOLIC PNL TOTAL CA: CPT

## 2024-08-26 PROCEDURE — 93005 ELECTROCARDIOGRAM TRACING: CPT | Performed by: HOSPITALIST

## 2024-08-26 PROCEDURE — C1713 ANCHOR/SCREW BN/BN,TIS/BN: HCPCS | Performed by: ORTHOPAEDIC SURGERY

## 2024-08-26 PROCEDURE — 6360000002 HC RX W HCPCS: Performed by: NURSE ANESTHETIST, CERTIFIED REGISTERED

## 2024-08-26 PROCEDURE — 7100000000 HC PACU RECOVERY - FIRST 15 MIN: Performed by: ORTHOPAEDIC SURGERY

## 2024-08-26 PROCEDURE — C1776 JOINT DEVICE (IMPLANTABLE): HCPCS | Performed by: ORTHOPAEDIC SURGERY

## 2024-08-26 PROCEDURE — 2060000000 HC ICU INTERMEDIATE R&B

## 2024-08-26 PROCEDURE — 2500000003 HC RX 250 WO HCPCS: Performed by: HOSPITALIST

## 2024-08-26 PROCEDURE — 6370000000 HC RX 637 (ALT 250 FOR IP): Performed by: PHYSICIAN ASSISTANT

## 2024-08-26 PROCEDURE — 36415 COLL VENOUS BLD VENIPUNCTURE: CPT

## 2024-08-26 DEVICE — BIPOLAR COMPONENT
Type: IMPLANTABLE DEVICE | Site: HIP | Status: FUNCTIONAL
Brand: UHR

## 2024-08-26 DEVICE — CEMENT BNE RADIOPAQUE SIMPLEX P SPEEDDET: Type: IMPLANTABLE DEVICE | Site: HIP | Status: FUNCTIONAL

## 2024-08-26 DEVICE — IMPL CAPPED H6 HEMI UNI/BIPOLAR STRYKER  PRIMARY STEM: Type: IMPLANTABLE DEVICE | Site: HIP | Status: FUNCTIONAL

## 2024-08-26 DEVICE — 127 DEGREE CEMENTED HIP STEM
Type: IMPLANTABLE DEVICE | Site: HIP | Status: FUNCTIONAL
Brand: ACCOLADE

## 2024-08-26 DEVICE — LFIT V40 FEMORAL HEAD
Type: IMPLANTABLE DEVICE | Site: HIP | Status: FUNCTIONAL
Brand: V40 HEAD

## 2024-08-26 DEVICE — UNIVERSAL DISTAL CEMENT SPACER
Type: IMPLANTABLE DEVICE | Site: HIP | Status: FUNCTIONAL
Brand: OMNIFIT

## 2024-08-26 RX ORDER — POLYETHYLENE GLYCOL 3350 17 G/17G
17 POWDER, FOR SOLUTION ORAL DAILY
Status: DISCONTINUED | OUTPATIENT
Start: 2024-08-26 | End: 2024-08-31

## 2024-08-26 RX ORDER — ENOXAPARIN SODIUM 100 MG/ML
40 INJECTION SUBCUTANEOUS DAILY
Status: DISCONTINUED | OUTPATIENT
Start: 2024-08-26 | End: 2024-08-30

## 2024-08-26 RX ORDER — PHENYLEPHRINE HYDROCHLORIDE 10 MG/ML
INJECTION INTRAVENOUS PRN
Status: DISCONTINUED | OUTPATIENT
Start: 2024-08-26 | End: 2024-08-26 | Stop reason: SDUPTHER

## 2024-08-26 RX ORDER — HYDROMORPHONE HYDROCHLORIDE 1 MG/ML
1 INJECTION, SOLUTION INTRAMUSCULAR; INTRAVENOUS; SUBCUTANEOUS
Status: DISCONTINUED | OUTPATIENT
Start: 2024-08-26 | End: 2024-09-06 | Stop reason: HOSPADM

## 2024-08-26 RX ORDER — ONDANSETRON 2 MG/ML
4 INJECTION INTRAMUSCULAR; INTRAVENOUS EVERY 6 HOURS PRN
Status: DISCONTINUED | OUTPATIENT
Start: 2024-08-26 | End: 2024-09-06 | Stop reason: HOSPADM

## 2024-08-26 RX ORDER — BISACODYL 5 MG/1
5 TABLET, DELAYED RELEASE ORAL DAILY PRN
Status: DISCONTINUED | OUTPATIENT
Start: 2024-08-26 | End: 2024-09-06 | Stop reason: HOSPADM

## 2024-08-26 RX ORDER — DEXAMETHASONE SODIUM PHOSPHATE 4 MG/ML
INJECTION, SOLUTION INTRA-ARTICULAR; INTRALESIONAL; INTRAMUSCULAR; INTRAVENOUS; SOFT TISSUE PRN
Status: DISCONTINUED | OUTPATIENT
Start: 2024-08-26 | End: 2024-08-26 | Stop reason: SDUPTHER

## 2024-08-26 RX ORDER — DIPHENHYDRAMINE HYDROCHLORIDE 50 MG/ML
12.5 INJECTION INTRAMUSCULAR; INTRAVENOUS
Status: DISCONTINUED | OUTPATIENT
Start: 2024-08-26 | End: 2024-08-26 | Stop reason: HOSPADM

## 2024-08-26 RX ORDER — ACETAMINOPHEN 325 MG/1
650 TABLET ORAL EVERY 6 HOURS
Status: DISCONTINUED | OUTPATIENT
Start: 2024-08-26 | End: 2024-09-06 | Stop reason: HOSPADM

## 2024-08-26 RX ORDER — NALOXONE HYDROCHLORIDE 0.4 MG/ML
INJECTION, SOLUTION INTRAMUSCULAR; INTRAVENOUS; SUBCUTANEOUS PRN
Status: DISCONTINUED | OUTPATIENT
Start: 2024-08-26 | End: 2024-08-26 | Stop reason: HOSPADM

## 2024-08-26 RX ORDER — TRANEXAMIC ACID 100 MG/ML
INJECTION, SOLUTION INTRAVENOUS PRN
Status: DISCONTINUED | OUTPATIENT
Start: 2024-08-26 | End: 2024-08-26 | Stop reason: SDUPTHER

## 2024-08-26 RX ORDER — FAMOTIDINE 20 MG/1
20 TABLET, FILM COATED ORAL DAILY
Status: DISCONTINUED | OUTPATIENT
Start: 2024-08-26 | End: 2024-09-06 | Stop reason: HOSPADM

## 2024-08-26 RX ORDER — SODIUM CHLORIDE, SODIUM LACTATE, POTASSIUM CHLORIDE, CALCIUM CHLORIDE 600; 310; 30; 20 MG/100ML; MG/100ML; MG/100ML; MG/100ML
INJECTION, SOLUTION INTRAVENOUS CONTINUOUS
Status: DISCONTINUED | OUTPATIENT
Start: 2024-08-26 | End: 2024-08-26 | Stop reason: HOSPADM

## 2024-08-26 RX ORDER — METOPROLOL TARTRATE 1 MG/ML
5 INJECTION, SOLUTION INTRAVENOUS ONCE
Status: COMPLETED | OUTPATIENT
Start: 2024-08-26 | End: 2024-08-26

## 2024-08-26 RX ORDER — DIPHENHYDRAMINE HYDROCHLORIDE 50 MG/ML
25 INJECTION INTRAMUSCULAR; INTRAVENOUS EVERY 6 HOURS PRN
Status: DISCONTINUED | OUTPATIENT
Start: 2024-08-26 | End: 2024-09-06 | Stop reason: HOSPADM

## 2024-08-26 RX ORDER — SODIUM CHLORIDE 0.9 % (FLUSH) 0.9 %
5-40 SYRINGE (ML) INJECTION PRN
Status: DISCONTINUED | OUTPATIENT
Start: 2024-08-26 | End: 2024-09-06 | Stop reason: HOSPADM

## 2024-08-26 RX ORDER — SODIUM CHLORIDE, SODIUM LACTATE, POTASSIUM CHLORIDE, CALCIUM CHLORIDE 600; 310; 30; 20 MG/100ML; MG/100ML; MG/100ML; MG/100ML
INJECTION, SOLUTION INTRAVENOUS CONTINUOUS PRN
Status: DISCONTINUED | OUTPATIENT
Start: 2024-08-26 | End: 2024-08-26 | Stop reason: SDUPTHER

## 2024-08-26 RX ORDER — LIDOCAINE HYDROCHLORIDE 20 MG/ML
INJECTION, SOLUTION EPIDURAL; INFILTRATION; INTRACAUDAL; PERINEURAL PRN
Status: DISCONTINUED | OUTPATIENT
Start: 2024-08-26 | End: 2024-08-26 | Stop reason: SDUPTHER

## 2024-08-26 RX ORDER — ONDANSETRON 4 MG/1
4 TABLET, ORALLY DISINTEGRATING ORAL EVERY 8 HOURS PRN
Status: DISCONTINUED | OUTPATIENT
Start: 2024-08-26 | End: 2024-09-06 | Stop reason: HOSPADM

## 2024-08-26 RX ORDER — DIPHENHYDRAMINE HCL 25 MG
25 CAPSULE ORAL EVERY 6 HOURS PRN
Status: DISCONTINUED | OUTPATIENT
Start: 2024-08-26 | End: 2024-09-06 | Stop reason: HOSPADM

## 2024-08-26 RX ORDER — PROPOFOL 10 MG/ML
INJECTION, EMULSION INTRAVENOUS CONTINUOUS PRN
Status: DISCONTINUED | OUTPATIENT
Start: 2024-08-26 | End: 2024-08-26 | Stop reason: SDUPTHER

## 2024-08-26 RX ORDER — FENTANYL CITRATE 50 UG/ML
INJECTION, SOLUTION INTRAMUSCULAR; INTRAVENOUS PRN
Status: DISCONTINUED | OUTPATIENT
Start: 2024-08-26 | End: 2024-08-26 | Stop reason: SDUPTHER

## 2024-08-26 RX ORDER — ROCURONIUM BROMIDE 10 MG/ML
INJECTION, SOLUTION INTRAVENOUS PRN
Status: DISCONTINUED | OUTPATIENT
Start: 2024-08-26 | End: 2024-08-26 | Stop reason: SDUPTHER

## 2024-08-26 RX ORDER — ONDANSETRON 2 MG/ML
4 INJECTION INTRAMUSCULAR; INTRAVENOUS
Status: DISCONTINUED | OUTPATIENT
Start: 2024-08-26 | End: 2024-08-26 | Stop reason: HOSPADM

## 2024-08-26 RX ORDER — OXYCODONE HYDROCHLORIDE 5 MG/1
10 TABLET ORAL
Status: DISCONTINUED | OUTPATIENT
Start: 2024-08-26 | End: 2024-09-06 | Stop reason: HOSPADM

## 2024-08-26 RX ORDER — 0.9 % SODIUM CHLORIDE 0.9 %
500 INTRAVENOUS SOLUTION INTRAVENOUS ONCE
Status: DISCONTINUED | OUTPATIENT
Start: 2024-08-26 | End: 2024-09-06 | Stop reason: HOSPADM

## 2024-08-26 RX ORDER — SODIUM CHLORIDE 9 MG/ML
INJECTION, SOLUTION INTRAVENOUS CONTINUOUS
Status: DISCONTINUED | OUTPATIENT
Start: 2024-08-26 | End: 2024-08-27

## 2024-08-26 RX ORDER — SODIUM CHLORIDE 0.9 % (FLUSH) 0.9 %
5-40 SYRINGE (ML) INJECTION EVERY 12 HOURS SCHEDULED
Status: DISCONTINUED | OUTPATIENT
Start: 2024-08-26 | End: 2024-09-06 | Stop reason: HOSPADM

## 2024-08-26 RX ORDER — SODIUM CHLORIDE 9 MG/ML
INJECTION, SOLUTION INTRAVENOUS PRN
Status: DISCONTINUED | OUTPATIENT
Start: 2024-08-26 | End: 2024-09-06 | Stop reason: HOSPADM

## 2024-08-26 RX ORDER — OXYCODONE HYDROCHLORIDE 5 MG/1
5 TABLET ORAL
Status: DISCONTINUED | OUTPATIENT
Start: 2024-08-26 | End: 2024-09-06 | Stop reason: HOSPADM

## 2024-08-26 RX ORDER — BISACODYL 10 MG
10 SUPPOSITORY, RECTAL RECTAL DAILY PRN
Status: DISCONTINUED | OUTPATIENT
Start: 2024-08-26 | End: 2024-09-06 | Stop reason: HOSPADM

## 2024-08-26 RX ADMIN — METOPROLOL TARTRATE 5 MG: 5 INJECTION INTRAVENOUS at 14:46

## 2024-08-26 RX ADMIN — PHENYLEPHRINE HYDROCHLORIDE 40 MCG: 10 INJECTION INTRAVENOUS at 08:44

## 2024-08-26 RX ADMIN — FENTANYL CITRATE 25 MCG: 50 INJECTION, SOLUTION INTRAMUSCULAR; INTRAVENOUS at 08:31

## 2024-08-26 RX ADMIN — LIDOCAINE HYDROCHLORIDE 50 MG: 20 INJECTION, SOLUTION EPIDURAL; INFILTRATION; INTRACAUDAL; PERINEURAL at 07:43

## 2024-08-26 RX ADMIN — SODIUM CHLORIDE, POTASSIUM CHLORIDE, SODIUM LACTATE AND CALCIUM CHLORIDE: 600; 310; 30; 20 INJECTION, SOLUTION INTRAVENOUS at 09:20

## 2024-08-26 RX ADMIN — FENTANYL CITRATE 50 MCG: 50 INJECTION, SOLUTION INTRAMUSCULAR; INTRAVENOUS at 08:05

## 2024-08-26 RX ADMIN — ROCURONIUM BROMIDE 5 MG: 10 INJECTION, SOLUTION INTRAVENOUS at 07:43

## 2024-08-26 RX ADMIN — SODIUM CHLORIDE, PRESERVATIVE FREE 10 ML: 5 INJECTION INTRAVENOUS at 21:14

## 2024-08-26 RX ADMIN — PROPOFOL 100 MCG/KG/MIN: 10 INJECTION, EMULSION INTRAVENOUS at 07:56

## 2024-08-26 RX ADMIN — PROPOFOL 100 MG: 10 INJECTION, EMULSION INTRAVENOUS at 07:45

## 2024-08-26 RX ADMIN — OXYCODONE HYDROCHLORIDE 10 MG: 10 TABLET ORAL at 02:34

## 2024-08-26 RX ADMIN — LISINOPRIL 40 MG: 20 TABLET ORAL at 12:37

## 2024-08-26 RX ADMIN — ROCURONIUM BROMIDE 15 MG: 10 INJECTION, SOLUTION INTRAVENOUS at 08:22

## 2024-08-26 RX ADMIN — SODIUM CHLORIDE: 9 INJECTION, SOLUTION INTRAVENOUS at 02:00

## 2024-08-26 RX ADMIN — POLYETHYLENE GLYCOL 3350 17 G: 17 POWDER, FOR SOLUTION ORAL at 12:38

## 2024-08-26 RX ADMIN — INSULIN GLARGINE 11 UNITS: 100 INJECTION, SOLUTION SUBCUTANEOUS at 12:43

## 2024-08-26 RX ADMIN — METOPROLOL TARTRATE 5 MG: 5 INJECTION INTRAVENOUS at 14:29

## 2024-08-26 RX ADMIN — FLECAINIDE ACETATE 50 MG: 100 TABLET ORAL at 12:38

## 2024-08-26 RX ADMIN — SUGAMMADEX 160 MG: 100 INJECTION, SOLUTION INTRAVENOUS at 09:22

## 2024-08-26 RX ADMIN — WATER 2000 MG: 1 INJECTION INTRAMUSCULAR; INTRAVENOUS; SUBCUTANEOUS at 16:34

## 2024-08-26 RX ADMIN — FENTANYL CITRATE 25 MCG: 50 INJECTION, SOLUTION INTRAMUSCULAR; INTRAVENOUS at 07:40

## 2024-08-26 RX ADMIN — SODIUM CHLORIDE: 9 INJECTION, SOLUTION INTRAVENOUS at 16:08

## 2024-08-26 RX ADMIN — FENTANYL CITRATE 50 MCG: 50 INJECTION, SOLUTION INTRAMUSCULAR; INTRAVENOUS at 07:44

## 2024-08-26 RX ADMIN — PHENYLEPHRINE HYDROCHLORIDE 40 MCG: 10 INJECTION INTRAVENOUS at 08:55

## 2024-08-26 RX ADMIN — SODIUM CHLORIDE, PRESERVATIVE FREE 10 ML: 5 INJECTION INTRAVENOUS at 12:42

## 2024-08-26 RX ADMIN — DILTIAZEM HYDROCHLORIDE 360 MG: 180 CAPSULE, COATED, EXTENDED RELEASE ORAL at 12:37

## 2024-08-26 RX ADMIN — ACETAMINOPHEN 650 MG: 325 TABLET ORAL at 16:32

## 2024-08-26 RX ADMIN — FENTANYL CITRATE 25 MCG: 50 INJECTION, SOLUTION INTRAMUSCULAR; INTRAVENOUS at 09:18

## 2024-08-26 RX ADMIN — SODIUM CHLORIDE: 9 INJECTION, SOLUTION INTRAVENOUS at 12:15

## 2024-08-26 RX ADMIN — FENTANYL CITRATE 25 MCG: 50 INJECTION, SOLUTION INTRAMUSCULAR; INTRAVENOUS at 07:38

## 2024-08-26 RX ADMIN — WATER 2000 MG: 1 INJECTION INTRAMUSCULAR; INTRAVENOUS; SUBCUTANEOUS at 08:00

## 2024-08-26 RX ADMIN — ROCURONIUM BROMIDE 45 MG: 10 INJECTION, SOLUTION INTRAVENOUS at 07:45

## 2024-08-26 RX ADMIN — TRANEXAMIC ACID 1000 MG: 100 INJECTION, SOLUTION INTRAVENOUS at 08:02

## 2024-08-26 RX ADMIN — ENOXAPARIN SODIUM 40 MG: 100 INJECTION SUBCUTANEOUS at 21:09

## 2024-08-26 RX ADMIN — SODIUM CHLORIDE, PRESERVATIVE FREE 10 ML: 5 INJECTION INTRAVENOUS at 21:13

## 2024-08-26 RX ADMIN — FLECAINIDE ACETATE 50 MG: 100 TABLET ORAL at 21:10

## 2024-08-26 RX ADMIN — METOPROLOL TARTRATE 5 MG: 5 INJECTION INTRAVENOUS at 14:19

## 2024-08-26 RX ADMIN — DEXAMETHASONE SODIUM PHOSPHATE 4 MG: 4 INJECTION INTRA-ARTICULAR; INTRALESIONAL; INTRAMUSCULAR; INTRAVENOUS; SOFT TISSUE at 07:58

## 2024-08-26 RX ADMIN — ACETAMINOPHEN 650 MG: 325 TABLET ORAL at 12:42

## 2024-08-26 RX ADMIN — SENNOSIDES AND DOCUSATE SODIUM 1 TABLET: 50; 8.6 TABLET ORAL at 12:41

## 2024-08-26 RX ADMIN — FAMOTIDINE 20 MG: 20 TABLET, FILM COATED ORAL at 12:38

## 2024-08-26 RX ADMIN — SODIUM CHLORIDE, POTASSIUM CHLORIDE, SODIUM LACTATE AND CALCIUM CHLORIDE: 600; 310; 30; 20 INJECTION, SOLUTION INTRAVENOUS at 07:30

## 2024-08-26 ASSESSMENT — PAIN DESCRIPTION - ORIENTATION: ORIENTATION: LEFT

## 2024-08-26 ASSESSMENT — PAIN - FUNCTIONAL ASSESSMENT: PAIN_FUNCTIONAL_ASSESSMENT: 0-10

## 2024-08-26 ASSESSMENT — PAIN DESCRIPTION - DESCRIPTORS: DESCRIPTORS: ACHING;SHOOTING

## 2024-08-26 ASSESSMENT — PAIN DESCRIPTION - LOCATION: LOCATION: HIP

## 2024-08-26 ASSESSMENT — PAIN SCALES - GENERAL
PAINLEVEL_OUTOF10: 4
PAINLEVEL_OUTOF10: 7

## 2024-08-26 NOTE — CONSULTS
SUBJECTIVE:     Kenyatta Santana is a 78 y.o. female  evaluated for a left FnF. The patient does not have dementia and they are able to verbally converse during the exam. Their mechanism of injury was GLF w/ walking. The patient localizes their pain to left hip. Intensity is noted to be moderate to severe. This injury occured Saturday.  Other concerns include none.    Past Medical History :   Past Medical History:   Diagnosis Date    Atrial fibrillation (HCC)     VCS [facility] Still and [facility]    Hypertension     Non-smoker        Past Surgical History :   Past Surgical History:   Procedure Laterality Date    ELBOW SURGERY Left 2004        Medications :  See med reconciliation    Allergies :   Patient has no known allergies.     Social History :  Social History     Socioeconomic History    Marital status:      Spouse name: Not on file    Number of children: Not on file    Years of education: Not on file    Highest education level: Not on file   Occupational History    Not on file   Tobacco Use    Smoking status: Never    Smokeless tobacco: Never   Vaping Use    Vaping status: Never Used   Substance and Sexual Activity    Alcohol use: Never    Drug use: Never    Sexual activity: Not on file   Other Topics Concern    Not on file   Social History Narrative    Not on file     Social Determinants of Health     Financial Resource Strain: Not on file   Food Insecurity: No Food Insecurity (8/25/2024)    Hunger Vital Sign     Worried About Running Out of Food in the Last Year: Never true     Ran Out of Food in the Last Year: Never true   Transportation Needs: No Transportation Needs (8/25/2024)    PRAPARE - Transportation     Lack of Transportation (Medical): No     Lack of Transportation (Non-Medical): No   Physical Activity: Not on file   Stress: Not on file   Social Connections: Moderately Integrated (8/26/2024)    Social Connections ([facility] HRSN)     If for any reason you need help with day-to-day activities such as  continue to follow the patient in the hospital and arrange for follow-up after the surgery.              Primitivo Pena MD  Cell [phone]  Neel Sapp PA-C  Cell [phone]  Medical Staff : Angie Palumbo  Office : [phone]

## 2024-08-26 NOTE — PROGRESS NOTES
Physical Therapy    Consult received, chart reviewed.  RR called just prior to PT's planned evaluation.  Spoke with RN who reports patient's HR sustained in the 170s.  Will defer and follow as appropriate to complete PT eval.    Beronica Montenegro, MS, PT

## 2024-08-26 NOTE — PROGRESS NOTES
Rapid Called at 13:55    Responded to RRT at 13:57 for Tachycardia    Per primary RN patients HR was 200 during routine vital sign assessment.     EKG completed. HR between 120-180s. BP: 127/93    NAD. Patient denies SOB, chest pain, palpitations, dizziness, or nausea.    Dr. Hicks at bedside.     14:19: IV Lopressor 5 mg given    BP: 125/88, HR: 120s    14:29: IV Lopressor 5 mg given    BP: 133/93, HR: 120s    14:46: IV Lopressor 5 mg given    Patient transferred to [facility].    Now in NSR, HR 70s.      Provider at bedside: YES  Interventions ordered: EKG  Sepsis Suspected: N/A  Transfer to Higher Level of Care: CU  Blood Glucose: 160        Rapid Ended at 15:15  RRT RN assisted with transport to accepting unit Yes.    Gladis Frey RN

## 2024-08-26 NOTE — ANESTHESIA PRE PROCEDURE
Department of Anesthesiology  Preprocedure Note       Name:  Kenyatta Santana   Age:  78 y.o.  :  1945                                          MRN:  [id number]         Date:  2024      Surgeon: Surgeon(s):  Primitivo Pena MD    Procedure: Procedure(s):  LEFT BIPOLAR HIP HEMIARTHROPLASTY    Medications prior to admission:   Prior to Admission medications    Medication Sig Start Date End Date Taking? Authorizing Provider   apixaban (ELIQUIS) 5 MG TABS tablet Take 1 tablet by mouth 2 times daily   Yes Provider, MD Sil   lisinopril (PRINIVIL;ZESTRIL) 40 MG tablet Take 1 tablet by mouth daily   Yes Provider, MD Sil   amLODIPine (NORVASC) 5 MG tablet Take 1 tablet by mouth daily   Yes ProviderSil MD   dilTIAZem (DILACOR XR) 240 MG extended release capsule Take 1 capsule by mouth daily   Yes ProviderSil MD   flecainide (TAMBOCOR) 50 MG tablet Take 1 tablet by mouth 2 times daily   Yes ProviderSil MD       Current medications:    Current Facility-Administered Medications   Medication Dose Route Frequency Provider Last Rate Last Admin    ceFAZolin (ANCEF) 2,000 mg in sterile water 20 mL IV syringe  2,000 mg IntraVENous On Call to OR Clay Chappell PA        amLODIPine (NORVASC) tablet 5 mg  5 mg Oral Daily Ashlyn Amaral MD   5 mg at 24 075    flecainide (TAMBOCOR) tablet 50 mg  50 mg Oral BID Ashlyn Amaral MD   50 mg at 24    lisinopril (PRINIVIL;ZESTRIL) tablet 40 mg  40 mg Oral Daily Ashlyn Amaral MD   40 mg at 24 075    glucose chewable tablet 16 g  4 tablet Oral PRN Ashlyn Amaral MD        dextrose bolus 10% 125 mL  125 mL IntraVENous PRN Ashlyn Amaral MD        Or    dextrose bolus 10% 250 mL  250 mL IntraVENous PRN Ashlyn Amaral MD        glucagon injection 1 mg  1 mg SubCUTAneous PRN Ashlyn Amaral MD        dextrose 10 % infusion   IntraVENous Continuous PRN Ashlyn Amaral MD        insulin glargine (LANTUS) injection  vial 11 Units  0.15 Units/kg SubCUTAneous Daily Ashlyn Amaral MD   11 Units at 08/25/24 0805    oxyCODONE (ROXICODONE) immediate release tablet 10 mg  10 mg Oral Q6H PRN Ashlyn Amaral MD   10 mg at 08/26/24 0234    oxyCODONE (ROXICODONE) immediate release tablet 5 mg  5 mg Oral Q6H PRN Ashlyn Amaral MD   5 mg at 08/25/24 1042    sennosides-docusate sodium (SENOKOT-S) 8.6-50 MG tablet 1 tablet  1 tablet Oral Daily Ashlyn Amaral MD   1 tablet at 08/25/24 0751    dilTIAZem (CARDIZEM CD) extended release capsule 360 mg  360 mg Oral Daily Aaron Hicks MD        hydrALAZINE (APRESOLINE) injection 10 mg  10 mg IntraVENous Q6H PRN Lu Sanchez APRN - NP        sodium chloride flush 0.9 % injection 5-40 mL  5-40 mL IntraVENous 2 times per day Clay Chappell PA   5 mL at 08/25/24 2054    sodium chloride flush 0.9 % injection 5-40 mL  5-40 mL IntraVENous PRN Clay Chappell PA        0.9 % sodium chloride infusion   IntraVENous PRN Clay Chappell PA        potassium chloride (KLOR-CON) extended release tablet 40 mEq  40 mEq Oral PRN Clay Chappell PA        Or    potassium bicarb-citric acid (EFFER-K) effervescent tablet 40 mEq  40 mEq Oral PRN Clay Chappell PA        Or    potassium chloride 10 mEq/100 mL IVPB (Peripheral Line)  10 mEq IntraVENous PRN Clay Chappell PA        magnesium sulfate 2000 mg in 50 mL IVPB premix  2,000 mg IntraVENous PRN Clay Chappell PA        polyethylene glycol (GLYCOLAX) packet 17 g  17 g Oral Daily PRN Clay Chappell PA        ondansetron (ZOFRAN-ODT) disintegrating tablet 4 mg  4 mg Oral Q8H PRN Clay Chappell PA        Or    ondansetron (ZOFRAN) injection 4 mg  4 mg IntraVENous Q6H PRN Graupman, Clay S, PA        HYDROmorphone (DILAUDID) injection 0.25 mg  0.25 mg IntraVENous Q3H PRN Clay Chappell PA        Or    HYDROmorphone (DILAUDID) injection 0.5 mg  0.5 mg IntraVENous Q3H PRN Clay Chappell, PA

## 2024-08-26 NOTE — CODE DOCUMENTATION
Patient lying comfortably in bed with no chest pain, no shortness of breath or dizziness. Heart rate sustaining in the 150s and increasing into the 200's at times.

## 2024-08-26 NOTE — OP NOTE
OPERATIVE REPORT     Admit Date: 8/24/2024  Admit Diagnosis: Closed fracture of neck of left femur, initial encounter ([facility]) [S72.002A]  Closed left hip fracture, initial encounter ([facility]) [S72.002A]    Preoperative Diagnosis: Closed fracture of left hip, initial encounter ([facility]) [S72.002A]  Postoperative Diagnosis: Same  Procedure: Procedure(s):  LEFT BIPOLAR HIP HEMIARTHROPLASTY  Surgeon: Primitivo Pena MD  Assistant(s):Neel Sapp PA-C  Anesthesia: General   Estimated Blood Loss: 300cc  Specimens: * No specimens in log *   Complications: None    INDICATIONS:    Kenyatta Santana is a patient who sustained a femoral neck fracture and was indicated for bipolar replacement. We discussed risks, alternatives and expectations prior to surgery. The patient was seen for medical clearance prior to the procedure.     PROCEDURE IN DETAIL:   The patient had the proper site initialized and their questions were answered prior to surgery. The patient was seen and cleared for surgery by primary care.     The patient underwent general endotracheal anesthesia, was positioned on the operating room table in the lateral decubitus position. The limb was prepped and draped in a sterile fashion. Prior to the start of the procedure, an appropriate timeout was performed.     Utilizing a posterolateral incision centered over the posterior 1/3 of the greater trochanter, this was taken down through skin and subcutaneous tissue. The iliotibial band and gluteus fascia was identified and sharply incised in-line with the incision. The  short external rotators were exposed. These were taken down along with the capsule as a unit off the neck and tagged with an #5 Ethibond suture. A neck cut was performed according to the implant geometry with the head and neck segment removed. A trial head was evaluated and found to be appropriately sized. Evaluation of the acetabulum demonstrated minimal wear.         The femur was then exposed. Systematic broaching  OFFSET HIP CO CHROM V40 TAPR LO FRIC - SN/A  HEAD FEM [id number] +0MM OFFSET HIP CO CHROM V40 TAPR LO FRIC N/A [employer] [facility]- [id number] Left 1 Implanted   HEAD BPLR OD44MM ID28MM UNIV CO CHROM POLY FEM HIP - SN/A  HEAD BPLR OD44MM ID28MM UNIV CO CHROM POLY FEM HIP N/A [employer] [facility] [id number] Left 1 Implanted       POST OPERATIVE CONSIDERATIONS :   WBAT    JUSTIFICATION FOR SURGICAL ASSISTANT:   Surgical Assistant, was requried and necessary in this case, to help with soft tissue retraction, extremity positioning, equiment management, implant management, and wound closure.        Primitivo Pnea MD

## 2024-08-26 NOTE — PERIOP NOTE
Attempted to update daughter on plan of care and delay in getting INPT room, no answer on phone, not in lobby.      1050 - PACU care complete, delay entered, floor orders released.    1100 - Pt incontinent of large amount of yellow urine, complete bed change done, pt tolerated rolling from side to side without problem.  Pt reports that she is normally continent though wears a brief from time to time.  Encouraged pt to alert staff to needed to void.    1110 - Bed assignment received, 4th floor made aware, awaiting callback for report and move to floor.  Pt aware and agreeable.    1130 - TRANSFER - OUT REPORT:    Verbal report given to Zurdo on Kenyatta Santana  being transferred to [facility] for routine post-op       Report consisted of patient's Situation, Background, Assessment and   Recommendations(SBAR).     Information from the following report(s) Surgery Report was reviewed with the receiving nurse.           Lines:   Peripheral IV 08/24/24 Right Antecubital (Active)   Site Assessment Clean, dry & intact 08/26/24 0945   Line Status Capped 08/26/24 0945   Line Care Ports disinfected 08/26/24 0339   Phlebitis Assessment No symptoms 08/26/24 0945   Infiltration Assessment 0 08/26/24 0945   Alcohol Cap Used Yes 08/26/24 0945   Dressing Status Clean, dry & intact 08/26/24 0945   Dressing Type Transparent 08/26/24 0945   Dressing Intervention New 08/24/24 2107       Peripheral IV 08/26/24 Right Hand (Active)   Site Assessment Clean, dry & intact 08/26/24 0945   Line Status Infusing 08/26/24 0945   Phlebitis Assessment No symptoms 08/26/24 0945   Infiltration Assessment 0 08/26/24 0945   Alcohol Cap Used Yes 08/26/24 0945   Dressing Status Clean, dry & intact 08/26/24 0945   Dressing Type Transparent 08/26/24 0945       Peripheral IV 08/26/24 Left;Posterior Hand (Active)   Site Assessment Clean, dry & intact 08/26/24 0947   Line Status Infusing 08/26/24 0947   Phlebitis Assessment No symptoms 08/26/24 0947   Infiltration

## 2024-08-26 NOTE — PROGRESS NOTES
obtained in the  oblique coronal plane through the biliary tree (MRCP).    FINDINGS:  Right renal cell carcinoma: A T2 and T1 heterogeneous, heterogeneously  hypoenhancing mass, exophytic from the upper pole of the right kidney, measures  44 x 48 x 48 mm (stable from 4/12/2019). This measurement is exclusive of a  crescent-shaped, posterior, smaller, cystic portion, which measures 14 x 31 x 31  mm. Enhancement is progressive, curvilinear, and in the periphery. On prior CT,  enhancement was not as apparent, and enhancement today is most apparent on  subtraction images. This very low level of enhancement and delayed enhancement  are not consistent with clear cell renal cell carcinoma. Papillary renal cell  carcinoma usually shows hypoenhancement, but more homogenous than this. Further,  papillary RCC does not typically show progressive enhancement. Stability for 15  months further makes RCC unlikely. No macroscopic fat on fat saturated images or  on prior CT. No microscopic lipid on out of phase images. However, a lipid-poor  angiomyolipoma is possible. More rare mesenchymal tumors of the kidney  (leiomyoma, solitary fibrous tumor, mixed epithelial and stromal tumor, etc) are  also possible. 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 No renal vein invasion.    MRCP: A large gallstone is impacted in the neck of the gallbladder (2-14).  Multiple moderate size stones are dependent in the fundus. T2 hypointense sludge  layers dependently No dilation, wall thickening, or pericholecystic fluid to  suggest acute cholecystitis. Intrahepatic and extrahepatic biliary ductal  dilation is mild; the common bile duct measures 9 mm. no choledocholithiasis,  overt ampullary mass, or overt pancreatic head mass. No pancreatic duct  dilation.    Abdomen: The distal esophagus, stomach, duodenum, liver, pancreas, spleen,  adrenals, left kidney, and visualized portions of the small bowel, are  injection 1 mg, 1 mg, SubCUTAneous, PRN, Ashlyn Amaral MD    dextrose 10 % infusion, , IntraVENous, Continuous PRN, Ashlyn Amaral MD    insulin glargine (LANTUS) injection vial 11 Units, 0.15 Units/kg, SubCUTAneous, Daily, Ashlyn Amaral MD, 11 Units at 08/26/24 1243    oxyCODONE (ROXICODONE) immediate release tablet 10 mg, 10 mg, Oral, Q6H PRN, Ashlyn Amaral MD, 10 mg at 08/26/24 0234    oxyCODONE (ROXICODONE) immediate release tablet 5 mg, 5 mg, Oral, Q6H PRN, Ashlyn Amaral MD, 5 mg at 08/25/24 1042    sennosides-docusate sodium (SENOKOT-S) 8.6-50 MG tablet 1 tablet, 1 tablet, Oral, Daily, Ashlyn Amaral MD, 1 tablet at 08/26/24 1241    dilTIAZem (CARDIZEM CD) extended release capsule 360 mg, 360 mg, Oral, Daily, Aaron Hicks MD, 360 mg at 08/26/24 1237    hydrALAZINE (APRESOLINE) injection 10 mg, 10 mg, IntraVENous, Q6H PRN, Lu Sanchez, NIKIA - NP    sodium chloride flush 0.9 % injection 5-40 mL, 5-40 mL, IntraVENous, 2 times per day, Clay Chappell PA, 10 mL at 08/26/24 1242    sodium chloride flush 0.9 % injection 5-40 mL, 5-40 mL, IntraVENous, PRN, Clay Chappell PA    0.9 % sodium chloride infusion, , IntraVENous, PRN, Clay Chappell PA    potassium chloride (KLOR-CON) extended release tablet 40 mEq, 40 mEq, Oral, PRN **OR** potassium bicarb-citric acid (EFFER-K) effervescent tablet 40 mEq, 40 mEq, Oral, PRN **OR** potassium chloride 10 mEq/100 mL IVPB (Peripheral Line), 10 mEq, IntraVENous, PRN, Clay Chappell PA    magnesium sulfate 2000 mg in 50 mL IVPB premix, 2,000 mg, IntraVENous, PRN, Clay Chappell, PA    polyethylene glycol (GLYCOLAX) packet 17 g, 17 g, Oral, Daily PRN, Clay Chappell, PA    ondansetron (ZOFRAN-ODT) disintegrating tablet 4 mg, 4 mg, Oral, Q8H PRN **OR** ondansetron (ZOFRAN) injection 4 mg, 4 mg, IntraVENous, Q6H PRN, Clay Chappell, PA    HYDROmorphone (DILAUDID) injection 0.25 mg, 0.25 mg, IntraVENous, Q3H PRN **OR**

## 2024-08-26 NOTE — ANESTHESIA POSTPROCEDURE EVALUATION
Department of Anesthesiology  Postprocedure Note    Patient: Kenyatta Santana  MRN: [id number]  YOB: 1945  Date of evaluation: 8/26/2024    Procedure Summary       Date: 08/26/24 Room / Location: [facility] OR [address] AMBULATORY OR    Anesthesia Start: 0738 Anesthesia Stop: 0940    Procedure: LEFT BIPOLAR HIP HEMIARTHROPLASTY (Left: Hip) Diagnosis:       Closed fracture of left hip, initial encounter ([facility])      (Closed fracture of left hip, initial encounter ([facility]) [S72.002A])    Surgeons: Primitivo Pena MD Responsible Provider: Arielle Aviles MD    Anesthesia Type: General ASA Status: 2            Anesthesia Type: General    Seven Phase I: Seven Score: 10    Seven Phase II:      Anesthesia Post Evaluation    Level of consciousness: awake  Airway patency: patent  Nausea & Vomiting: no nausea  Cardiovascular status: hemodynamically stable  Respiratory status: acceptable  Hydration status: euvolemic  Pain management: adequate    No notable events documented.

## 2024-08-26 NOTE — PROGRESS NOTES
[facility]  [address] [place]  [address] [phone]         Hospitalist Progress Note        NAME: Kenyatta Santana   :  1945   MRN:  [id number]    Date:  2024     Patient PCP: No primary care provider on file.    Code Status: Full Code     Isolation Precautions: No active isolations    Barrier(s) to discharge:  Currently not medically stable for discharge and Needs treatment/procedures left hip repair  Estimated date of discharge:   3-4 days  Discharge disposition:  [facility] or SNF/LTC    Assessment/Plan:      POD Day of Surgery  Procedure: * No surgery found *    Paroxysmal atrial fibrillation with RVR  Secondary hypercoagulable state due to A-fib  Long-term anticoagulation with Eliquis  HR upto 200 and sustating in 170s AFIB RVR Rapid response called  Given her flecainide and diltiazem PO post op  Given IV Lopressor 5 mg 3 times which converted her to NSR and controlled her heart rate.  However would be transferring down to stepdown unit for closer monitoring and initiation of diltiazem bolus and drip if needed.  Consult cardiology for A-fib with RVR  Eliquis on hold for surgery - restart when cleared by ortho    Left femoral neck fracture due to mechanical fall, POA  Surgery being held off for 48 hours off Eliquis  Pain control according to intensity Tylenol: mild pain, Roxicodone: mod pain, and Dilaudid IV: severe pain.  Preop echo EF 55% but had abnormal diastolic function  Cardiology cleared for surgery    Leukocytosis, likely reactive d/t fracture, POA  No s/s of infection  Monitor closely    HTN  Continue lisinopril and amlodipine  Change diltiazem from 260-360 which was confirmed by pharmacy that this medication is given along with amlodipine.    Probable DM type II (undiagnosed) with hyperglycemia 230, POA  Started on insulin and consider changing to p.o. on discharge  A1c 6.4  Diabetic teaching    Questionable CKD stage IIIa  Patient unaware of

## 2024-08-26 NOTE — PROGRESS NOTES
Occupational Therapy: hold    Consult received, chart reviewed.  RR called just prior to OT's planned evaluation.  Spoke with RN who reports patient's HR sustained in the 170s.  Will defer and follow as appropriate to complete OT eval.    Sim Quinteros, OTR/L

## 2024-08-26 NOTE — CARE COORDINATION
8/26/24  4:44 PM    Care Management Assessment      Reason for Admission:     Closed fracture of neck of left femur, initial encounter ([facility]) [S72.002A]  Closed left hip fracture, initial encounter ([facility]) [S72.002A]  Displaced fracture of left femoral neck ([facility]) [S72.002A]  Procedure(s) (LRB):  LEFT BIPOLAR HIP HEMIARTHROPLASTY (Left)  Day of Surgery      Assessment:   []In person with pt   []Via p/c with pt   []With family member in person. Who/Relation:     []With family member via p/c. Who/Relation:   []Chart Review    RUR:14%    Advance Directive: Full Code     [x] No AD on file.    [] AD on file.    [] Current AD not on file. Copy requested.    [] Requests AD, and referral submitted to [facility].     Healthcare Decision Maker:   Carline Dudley- Daughter      Assessment:     08/26/24 1641   Service Assessment   Patient Orientation Alert and Oriented   Cognition Alert   History Provided By Patient   Primary Caregiver Self   Support Systems Children   Patient's Healthcare Decision Maker is: Legal Next of Kin   PCP Verified by CM Yes   Last Visit to PCP   (patient is in need of a PCP- sent a message to CM specialist)   Prior Functional Level Independent in ADLs/IADLs   Current Functional Level Assistance with the following:;Bathing;Dressing;Toileting;Cooking;Housework;Shopping;Mobility   Can patient return to prior living arrangement Yes   Ability to make needs known: Good   Family able to assist with home care needs: Yes   Would you like for me to discuss the discharge plan with any other family members/significant others, and if so, who? No   Financial Resources Medicare   Community Resources None   Social/Functional History   Lives With Alone   Type of Home House   Home Layout One level   Bathroom Equipment Grab bars in shower;Shower chair   Home Equipment Walker - Rolling;Rollator   ADL Assistance Independent   Homemaking Assistance Independent   Ambulation Assistance Independent   Transfer Assistance  Independent   Active  Yes   Mode of Transportation Car   Occupation Retired   Discharge Planning   Type of Residence House   Living Arrangements Alone   Current Services Prior To Admission None   Potential Assistance Needed N/A   Potential Assistance Purchasing Medications No   Type of Home Care Services None   Patient expects to be discharged to: Unknown   Services At/After Discharge   [place] Resource Information Provided? No   Mode of Transport at Discharge Other (see comment)  (Family to transport at [place])   Confirm Follow Up Transport Family   Condition of Participation: Discharge Planning   The Plan for Transition of Care is related to the following treatment goals: hip repair   The Patient and/or Patient Representative was provided with a Choice of Provider? Patient   The Patient and/Or Patient Representative agree with the Discharge Plan? Yes   Freedom of Choice list was provided with basic dialogue that supports the patient's individualized plan of care/goals, treatment preferences, and shares the quality data associated with the providers?  Yes  (If needs [facility]- [facility] and [facility], if needs SNF- [facility])       Transition of care plan:    CM reviewed EMR and met with patient, her daughter and niece in the room to complete the initial evaluation. Confirmed charted demographics and explained role of CM. Patient resides alone and is normally independent with all ADLs and iADLs, drives. She has all needed equipment at home from her spouse () but uses no DME for herself. She has no rehab history but if needs rehab prefers [facility] or [facility] and [facility] if SNF is recommended. She does not currently have a PCP- CM sent a message to CM specialist to assist in obtaining an appointment.     DISCHARGE PLAN: TBD- await PT/OT recs    TRANSPORTATION AT DISCHARGE: [facility]  [facility]      Available by Eureka King

## 2024-08-26 NOTE — DISCHARGE INSTRUCTIONS
HOSPITALIST DISCHARGE INSTRUCTIONS  NAME:  Kenyatta Santana   :  1945   MRN:  [id number]     Date/Time:  2024 12:12 PM    ADMIT DATE: 2024     DISCHARGE DATE: 2024     DISCHARGE DIAGNOSIS: L hip Fracture. Atrial fibrillation with fast heart rate. Covid     DISCHARGE INSTRUCTIONS:  Thank you for allowing us to participate in your care. Your discharging Hospitalist is Miguel aldana MD. You were admitted for evaluation and treatment of the above. You underwent repair of your hip fracture. You underwent cardioversion to put your heart back in normal rhythm. Your medications were adjusted so please review them closely.       MEDICATIONS:    It is important that you take the medication exactly as they are prescribed.   Keep your medication in the bottles provided by the pharmacist and keep a list of the medication names, dosages, and times to be taken in your wallet.   Do not take other medications without consulting your doctor.             If you experience any of the following symptoms then please call your primary care physician or return to the emergency room if you cannot get hold of your doctor:  Fever, chills, nausea, vomiting, diarrhea, change in mentation, falling, bleeding, shortness of breath    Follow Up:  Please call the below provider to arrange hospital follow up appointment      Debby Rosenthal DO  [address] [facility]  [address]  [facility] [address] [phone]    Follow up on 10/9/2024  New patient PCP appointment set for  at 10:40 AM with Doctor Galo at their [place] location. Office asks patient to arrive 10 to 15 mins early,bring picture ID/insurance cards and list of any medications they are taking.    Primitivo Pena MD  [address] [facility] [address]  [address]  [facility] [address] [phone]    Follow up in 2 week(s)      Primitivo Pena MD  [address] [facility]  [address]  [facility] [address] [phone]            For questions regarding

## 2024-08-26 NOTE — PLAN OF CARE
Problem: Pain  Goal: Verbalizes/displays adequate comfort level or baseline comfort level  Outcome: Progressing  Flowsheets (Taken 8/26/2024 0945 by Summer Hanna, RN)  Verbalizes/displays adequate comfort level or baseline comfort level: Assess pain using appropriate pain scale     Problem: Safety - Adult  Goal: Free from fall injury  Outcome: Progressing     Problem: Skin/Tissue Integrity  Goal: Absence of new skin breakdown  Description: 1.  Monitor for areas of redness and/or skin breakdown  2.  Assess vascular access sites hourly  3.  Every 4-6 hours minimum:  Change oxygen saturation probe site  4.  Every 4-6 hours:  If on nasal continuous positive airway pressure, respiratory therapy assess nares and determine need for appliance change or resting period.  Outcome: Progressing     Problem: ABCDS Injury Assessment  Goal: Absence of physical injury  Outcome: Progressing     Problem: Neurosensory - Adult  Goal: Achieves stable or improved neurological status  Outcome: Progressing  Flowsheets (Taken 8/26/2024 1000 by Summer Hanna, RN)  Achieves stable or improved neurological status: Assess for and report changes in neurological status     Problem: Respiratory - Adult  Goal: Achieves optimal ventilation and oxygenation  Outcome: Progressing  Flowsheets (Taken 8/26/2024 1000 by Summer Hanna, RN)  Achieves optimal ventilation and oxygenation: Assess for changes in respiratory status     Problem: Cardiovascular - Adult  Goal: Maintains optimal cardiac output and hemodynamic stability  Outcome: Progressing  Flowsheets (Taken 8/26/2024 1000 by Summer Hanna, RN)  Maintains optimal cardiac output and hemodynamic stability: Monitor blood pressure and heart rate     Problem: Skin/Tissue Integrity - Adult  Goal: Incisions, wounds, or drain sites healing without S/S of infection  Outcome: Progressing     Problem: Musculoskeletal - Adult  Goal: Return ADL status to a safe level of function  Outcome:  Progressing  Flowsheets (Taken 8/26/2024 1000 by Summer Hanna, RN)  Return ADL Status to a Safe Level of Function: Administer medication as ordered     Problem: Discharge Planning  Goal: Discharge to home or other facility with appropriate resources  Outcome: Progressing

## 2024-08-27 PROBLEM — R73.03 PREDIABETES: Status: ACTIVE | Noted: 2024-08-27

## 2024-08-27 PROBLEM — S72.002A CLOSED FRACTURE OF NECK OF LEFT FEMUR (HCC): Status: ACTIVE | Noted: 2024-08-27

## 2024-08-27 LAB
ANION GAP SERPL CALC-SCNC: 5 MMOL/L (ref 5–15)
BUN SERPL-MCNC: 22 MG/DL (ref 6–20)
BUN/CREAT SERPL: 28 (ref 12–20)
CALCIUM SERPL-MCNC: 8.9 MG/DL (ref 8.5–10.1)
CHLORIDE SERPL-SCNC: 112 MMOL/L (ref 97–108)
CO2 SERPL-SCNC: 25 MMOL/L (ref 21–32)
CREAT SERPL-MCNC: 0.8 MG/DL (ref 0.55–1.02)
EKG ATRIAL RATE: 256 BPM
EKG DIAGNOSIS: NORMAL
EKG Q-T INTERVAL: 332 MS
EKG QRS DURATION: 88 MS
EKG QTC CALCULATION (BAZETT): 527 MS
EKG R AXIS: 81 DEGREES
EKG T AXIS: 234 DEGREES
EKG VENTRICULAR RATE: 152 BPM
GLUCOSE BLD STRIP.AUTO-MCNC: 114 MG/DL (ref 65–117)
GLUCOSE BLD STRIP.AUTO-MCNC: 132 MG/DL (ref 65–117)
GLUCOSE BLD STRIP.AUTO-MCNC: 165 MG/DL (ref 65–117)
GLUCOSE BLD STRIP.AUTO-MCNC: 194 MG/DL (ref 65–117)
GLUCOSE SERPL-MCNC: 137 MG/DL (ref 65–100)
HCT VFR BLD AUTO: 36.1 % (ref 35–47)
HGB BLD-MCNC: 12.1 G/DL (ref 11.5–16)
POTASSIUM SERPL-SCNC: 4 MMOL/L (ref 3.5–5.1)
SERVICE CMNT-IMP: ABNORMAL
SERVICE CMNT-IMP: NORMAL
SODIUM SERPL-SCNC: 142 MMOL/L (ref 136–145)
TSH SERPL DL<=0.05 MIU/L-ACNC: 0.26 UIU/ML (ref 0.36–3.74)

## 2024-08-27 PROCEDURE — 2060000000 HC ICU INTERMEDIATE R&B

## 2024-08-27 PROCEDURE — 2580000003 HC RX 258: Performed by: PHYSICIAN ASSISTANT

## 2024-08-27 PROCEDURE — 97530 THERAPEUTIC ACTIVITIES: CPT

## 2024-08-27 PROCEDURE — 97116 GAIT TRAINING THERAPY: CPT

## 2024-08-27 PROCEDURE — 82962 GLUCOSE BLOOD TEST: CPT

## 2024-08-27 PROCEDURE — 2500000003 HC RX 250 WO HCPCS: Performed by: PHYSICIAN ASSISTANT

## 2024-08-27 PROCEDURE — 84443 ASSAY THYROID STIM HORMONE: CPT

## 2024-08-27 PROCEDURE — 97110 THERAPEUTIC EXERCISES: CPT

## 2024-08-27 PROCEDURE — 80048 BASIC METABOLIC PNL TOTAL CA: CPT

## 2024-08-27 PROCEDURE — 6360000002 HC RX W HCPCS: Performed by: PHYSICIAN ASSISTANT

## 2024-08-27 PROCEDURE — 85018 HEMOGLOBIN: CPT

## 2024-08-27 PROCEDURE — 6370000000 HC RX 637 (ALT 250 FOR IP): Performed by: HOSPITALIST

## 2024-08-27 PROCEDURE — 99233 SBSQ HOSP IP/OBS HIGH 50: CPT | Performed by: INTERNAL MEDICINE

## 2024-08-27 PROCEDURE — 36415 COLL VENOUS BLD VENIPUNCTURE: CPT

## 2024-08-27 PROCEDURE — 97161 PT EVAL LOW COMPLEX 20 MIN: CPT

## 2024-08-27 PROCEDURE — APPSS30 APP SPLIT SHARED TIME 16-30 MINUTES: Performed by: NURSE PRACTITIONER

## 2024-08-27 PROCEDURE — 93010 ELECTROCARDIOGRAM REPORT: CPT | Performed by: SPECIALIST

## 2024-08-27 PROCEDURE — 99221 1ST HOSP IP/OBS SF/LOW 40: CPT

## 2024-08-27 PROCEDURE — 94761 N-INVAS EAR/PLS OXIMETRY MLT: CPT

## 2024-08-27 PROCEDURE — 6370000000 HC RX 637 (ALT 250 FOR IP): Performed by: PHYSICIAN ASSISTANT

## 2024-08-27 PROCEDURE — 85014 HEMATOCRIT: CPT

## 2024-08-27 PROCEDURE — 97165 OT EVAL LOW COMPLEX 30 MIN: CPT

## 2024-08-27 RX ORDER — INSULIN LISPRO 100 [IU]/ML
0-4 INJECTION, SOLUTION INTRAVENOUS; SUBCUTANEOUS
Status: DISCONTINUED | OUTPATIENT
Start: 2024-08-27 | End: 2024-08-30

## 2024-08-27 RX ORDER — INSULIN LISPRO 100 [IU]/ML
0-4 INJECTION, SOLUTION INTRAVENOUS; SUBCUTANEOUS NIGHTLY
Status: DISCONTINUED | OUTPATIENT
Start: 2024-08-27 | End: 2024-08-30

## 2024-08-27 RX ADMIN — LISINOPRIL 40 MG: 20 TABLET ORAL at 08:50

## 2024-08-27 RX ADMIN — POLYETHYLENE GLYCOL 3350 17 G: 17 POWDER, FOR SOLUTION ORAL at 08:38

## 2024-08-27 RX ADMIN — OXYCODONE HYDROCHLORIDE 5 MG: 5 TABLET ORAL at 21:37

## 2024-08-27 RX ADMIN — FLECAINIDE ACETATE 50 MG: 100 TABLET ORAL at 08:50

## 2024-08-27 RX ADMIN — ENOXAPARIN SODIUM 40 MG: 100 INJECTION SUBCUTANEOUS at 08:45

## 2024-08-27 RX ADMIN — AMLODIPINE BESYLATE 5 MG: 5 TABLET ORAL at 08:50

## 2024-08-27 RX ADMIN — ACETAMINOPHEN 650 MG: 325 TABLET ORAL at 12:00

## 2024-08-27 RX ADMIN — ACETAMINOPHEN 650 MG: 325 TABLET ORAL at 16:43

## 2024-08-27 RX ADMIN — FLECAINIDE ACETATE 50 MG: 100 TABLET ORAL at 21:37

## 2024-08-27 RX ADMIN — SODIUM CHLORIDE: 9 INJECTION, SOLUTION INTRAVENOUS at 00:14

## 2024-08-27 RX ADMIN — DILTIAZEM HYDROCHLORIDE 360 MG: 180 CAPSULE, COATED, EXTENDED RELEASE ORAL at 08:50

## 2024-08-27 RX ADMIN — SENNOSIDES AND DOCUSATE SODIUM 1 TABLET: 50; 8.6 TABLET ORAL at 08:50

## 2024-08-27 RX ADMIN — SODIUM CHLORIDE, PRESERVATIVE FREE 10 ML: 5 INJECTION INTRAVENOUS at 21:38

## 2024-08-27 RX ADMIN — FAMOTIDINE 20 MG: 10 INJECTION INTRAVENOUS at 08:39

## 2024-08-27 RX ADMIN — SODIUM CHLORIDE, PRESERVATIVE FREE 10 ML: 5 INJECTION INTRAVENOUS at 08:49

## 2024-08-27 RX ADMIN — ACETAMINOPHEN 650 MG: 325 TABLET ORAL at 06:08

## 2024-08-27 RX ADMIN — WATER 2000 MG: 1 INJECTION INTRAMUSCULAR; INTRAVENOUS; SUBCUTANEOUS at 00:05

## 2024-08-27 RX ADMIN — SODIUM CHLORIDE, PRESERVATIVE FREE 10 ML: 5 INJECTION INTRAVENOUS at 08:53

## 2024-08-27 RX ADMIN — INSULIN GLARGINE 11 UNITS: 100 INJECTION, SOLUTION SUBCUTANEOUS at 08:46

## 2024-08-27 RX ADMIN — ACETAMINOPHEN 650 MG: 325 TABLET ORAL at 00:05

## 2024-08-27 ASSESSMENT — PAIN SCALES - GENERAL
PAINLEVEL_OUTOF10: 3
PAINLEVEL_OUTOF10: 7

## 2024-08-27 ASSESSMENT — PAIN DESCRIPTION - LOCATION: LOCATION: LEG

## 2024-08-27 ASSESSMENT — PAIN DESCRIPTION - ORIENTATION: ORIENTATION: LEFT

## 2024-08-27 ASSESSMENT — PAIN DESCRIPTION - DESCRIPTORS: DESCRIPTORS: DULL;DISCOMFORT

## 2024-08-27 NOTE — PLAN OF CARE
[]                                        []                                        []                                        []                                              []                                        []                                        []                                        []                                             STANDING  EXERCISES   Sets   Reps   Active Active Assist   Passive Self ROM   Comments   Heel Raises   []                                        []                                        []                                        []                                           Hip Abduction   []                                        []                                        []                                        []                                              []                                        []                                        []                                        []                                              []                                        []                                        []                                        []                                                                                                                                                                                                                                                                                                                                                                                                                                                                                                        [facility] AM-PAC®      Basic Mobility Inpatient Short Form (6-Clicks) Version 2    How much help is needed turning from your back to your side while in a flat bed without using bedrails?: A Little  How much help is needed moving from lying on your back to sitting on the side of a flat bed without using  activated    COMMUNICATION/EDUCATION:   The patient's plan of care was discussed with: occupational therapist and registered nurse         Thank you for this referral.  Beronica Montenegro, PT  Minutes: 32

## 2024-08-27 NOTE — PROGRESS NOTES
Harman Donis [facility]  [address] [place]  [address] [phone]         Hospitalist Progress Note        NAME: Kenyatta Santana   :  1945   MRN:  [id number]    Date:  2024     Patient PCP: No primary care provider on file.    Code Status: Full Code     Isolation Precautions: No active isolations    Barrier(s) to discharge:  Currently not medically stable for discharge and Needs treatment/procedures left hip repair  Estimated date of discharge:   3-4 days  Discharge disposition:  [facility] or SNF/LTC    Assessment/Plan:      POD 1 Day Post-Op  Procedure: * No surgery found *    Paroxysmal atrial fibrillation with RVR  Secondary hypercoagulable state due to A-fib  Long-term anticoagulation with Eliquis  Currently rate controlled and in NSR  On  HR upto 200 and sustating in 170s AFIB RVR Rapid response called treated and converted with IV lopressor  Continue oral flecainide and diltiazem  Eliquis on hold for surgery - restart when cleared by ortho  Consulted cardiology for A-fib with RVR note reviewed    Left femoral neck fracture due to mechanical fall, POA  Surgery being held off for 48 hours off Eliquis  Pain control according to intensity Tylenol: mild pain, Roxicodone: mod pain, and Dilaudid IV: severe pain.  Preop echo EF 55% but had abnormal diastolic function  Cardiology cleared for surgery    Leukocytosis, likely reactive d/t fracture, POA  No s/s of infection  Monitor closely    HTN  Continue lisinopril and amlodipine  Change diltiazem from 260-360 which was confirmed by pharmacy that this medication is given along with amlodipine.    Probable DM type II (undiagnosed) with hyperglycemia 230, POA  Started on insulin and consider changing to p.o. on discharge  A1c 6.4  Diabetic teaching    Questionable CKD stage IIIa  Patient unaware of kidney problems  Avoid nephrotoxic agents much as possible and monitor labs.  Check labs for CKD to include intact PTH, vitamin D,

## 2024-08-27 NOTE — PROGRESS NOTES
This afternoon, pt went into afib with RVR with HR as high as 180's-200 per documentation. EKG showed afib with RVR. RRT was called. She was given a total of 3 doses of IV lopressor and she was transferred to [facility]. Upon arrival to [facility], she was noted to be in NSR with HR in the 70's.  Pt reports during RVR episode, she had no chest pain, SOB, dizziness, palpitations or lightheadedness.     SUBJECTIVE:  Denies of any chest pain, shortness of breath, palpitations     Assessment and Plan       Paroxysmal atrial fibrillation with RVR  -Had episode of afib with RVR 8/26, missed some medications d/t surgery  - converted to SR after IV metoprolol   -Continue Flecainide 50 mg BID  -Continue diltiazem 360 mg daily  -Restart Eliquis once cleared from surgery standpoint  -She had stress test at [facility] 2 months ago which reportedly showed no ischemia.  -Echo 8/25/24 shows EF 55%, abnormal diastolic function. Mild TR, trace MR.      2. Left femoral neck fracture secondary to mechanical fall  -s/p left hip hemiarthroplasty    3.Hypertension  - Bp was elevated earlier today but now controlled.  She is on lisinopril, amlodipine, diltiazem    4. Pre diabetes  - A1c 6.4   - Management per hospitalist team    Ok for d/c whenever otherwise stable, should follow up with [facility] on discharge. Will sign off           ____________________________________________________________    Cardiac testing  08/24/24    ECHO (TTE) COMPLETE (PRN CONTRAST/BUBBLE/STRAIN/3D) 08/26/2024  8:55 AM (Final)    Interpretation Summary    Left Ventricle: Normal left ventricular systolic function. EF by visual approximation is 55%. Not well visualized. Left ventricle size is normal. Normal wall thickness. Unable to assess wall motion. Abnormal diastolic function.    Right Ventricle: Not well visualized. Right ventricle size is normal.    Aortic Valve: Not well visualized. No significant stenosis. AV mean gradient is 12 mmHg. AV peak gradient is 22 mmHg.    Mitral Valve:  8.6-50 MG tablet 1 tablet, 1 tablet, Oral, Daily, Ashlyn Amaral MD, 1 tablet at 08/27/24 0850    dilTIAZem (CARDIZEM CD) extended release capsule 360 mg, 360 mg, Oral, Daily, Aaron Hicks MD, 360 mg at 08/27/24 0850    hydrALAZINE (APRESOLINE) injection 10 mg, 10 mg, IntraVENous, Q6H PRN, Lu Sanchez, NIKIA - NP    sodium chloride flush 0.9 % injection 5-40 mL, 5-40 mL, IntraVENous, 2 times per day, Clay Chappell PA, 10 mL at 08/27/24 0853    sodium chloride flush 0.9 % injection 5-40 mL, 5-40 mL, IntraVENous, PRN, Clay Chappell PA    0.9 % sodium chloride infusion, , IntraVENous, PRN, Clay Chappell PA    potassium chloride (KLOR-CON) extended release tablet 40 mEq, 40 mEq, Oral, PRN **OR** potassium bicarb-citric acid (EFFER-K) effervescent tablet 40 mEq, 40 mEq, Oral, PRN **OR** potassium chloride 10 mEq/100 mL IVPB (Peripheral Line), 10 mEq, IntraVENous, PRN, Clay Chappell PA    magnesium sulfate 2000 mg in 50 mL IVPB premix, 2,000 mg, IntraVENous, PRN, Clay Chappell PA    polyethylene glycol (GLYCOLAX) packet 17 g, 17 g, Oral, Daily PRN, Clay Chappell PA    ondansetron (ZOFRAN-ODT) disintegrating tablet 4 mg, 4 mg, Oral, Q8H PRN **OR** ondansetron (ZOFRAN) injection 4 mg, 4 mg, IntraVENous, Q6H PRN, Clay Chappell PA    HYDROmorphone (DILAUDID) injection 0.25 mg, 0.25 mg, IntraVENous, Q3H PRN **OR** HYDROmorphone (DILAUDID) injection 0.5 mg, 0.5 mg, IntraVENous, Q3H PRN, Graupman, Clay S, PA    Lisandra Henry, APRN - NP    CC:No primary care provider on file.

## 2024-08-27 NOTE — PLAN OF CARE
Problem: Physical Therapy - Adult  Goal: By Discharge: Performs mobility at highest level of function for planned discharge setting.  See evaluation for individualized goals.  Description: FUNCTIONAL STATUS PRIOR TO ADMISSION: Patient was independent without DME.    HOME SUPPORT PRIOR TO ADMISSION: The patient lived alone with daughter close by to provide assistance if needed.    Physical Therapy Goals  Initiated 8/27/2024  1.  Patient will move from supine to sit and sit to supine, scoot up and down, and roll side to side in bed with modified independence within 4 day(s).    2.  Patient will perform sit to stand with modified independence within 4 day(s).  3.  Patient will transfer from bed to chair and chair to bed with modified independence using the least restrictive device within 4 day(s).  4.  Patient will ambulate with modified independence for 150 feet with the least restrictive device within 4 day(s).   5.  Patient will perform LE home exercise program per protocol with modified independence within 4 days.      8/27/2024 1607 by Cuate Thompson, PTA  Outcome: Progressing  8/27/2024 1446 by Beronica Montenegro PT  Outcome: Progressing   PHYSICAL THERAPY TREATMENT    Patient: Kenyatta Santana (78 y.o. female)  Date: 8/27/2024  Diagnosis: Closed fracture of neck of left femur, initial encounter ([facility]) [S72.002A]  Closed left hip fracture, initial encounter ([facility]) [S72.002A]  Displaced fracture of left femoral neck ([facility]) [S72.002A] Closed left hip fracture, initial encounter ([facility])  Procedure(s) (LRB):  LEFT BIPOLAR HIP HEMIARTHROPLASTY (Left) 1 Day Post-Op  Precautions:                        ASSESSMENT:  Patient continues to benefit from skilled PT services.Pt seen for second treatment.Pt sleeping soundly on arrival of PT.Pt awakened and reported recently back to bed from restroom with nurse assist.Pt agreeable to and performed left hip exercise with min assist and cues.Pt tolerated tx well.PT will follow in

## 2024-08-27 NOTE — CONSULTS
[facility]  DIABETES MANAGEMENT CONSULT    Consulted by Provider for advanced nursing evaluation and care for inpatient blood glucose management.    Evaluation and Action Plan   Kenyatta Santana is a 78 y.o. female with no prior history of diabetes, HTN, previous Afib, who was admitted after a fall while out shopping with her daughter, resulting in hip fracture. Patient is usually on blood thinners for Afib, so surgery delayed a few days and finally done yesterday (8/26). Her BG on admission was 230 (pain and stressors) and A1c was found to be 6.4%, indicating a pre-diabetes diagnosis. Patient and daughter express that patient has been under a lot of stress as her 's primary caregiver last few years. Patient's  passed away in February, which has also caused patient much stress. This stress is likely factoring in to elevated BG. She does have some concentrated sweets in her diet, but not excessive. We discussed some carb control and avoidance of obvious sugary foods. I would not be inclined to start patient on any medication at this point, but would encourage diet management. At age 78, she also has a higher allowance with her BG and A1c, but encouraged her to stay on top of it and follow up to check lab work on a regular basis. Patient's daughter was in room and is very helpful in supporting her mother's health.     Patient was started on low dose basal insulin due to high BG on admission. FBG was 114 this am. I think at this point , basal can be discontinued and will cover with correctional if needed. Will switch her to a carb controlled diet. Breakfast was late today so pre-lunch BG may be a bit high. Will continue to monitor.     Management Rationale Action Plan   Medication   Corrective insulin Using low dose scale based on weight         Diabetes Discharge Plan   Medication: will likely not need any medication     Referral  []        Outpatient diabetes education    SpO2: 98%       Laboratory  Recent Labs     08/24/24  2100 08/27/24  0328   WBC 13.3*  --    HGB 13.6 12.1   HCT 41.8 36.1   MCV 94.4  --      --      Recent Labs     08/24/24  2100 08/26/24  1551 08/27/24  0328    140 142   K 4.5 4.5 4.0    111* 112*   CO2 24 24 25   BUN 22* 13 22*   CREATININE 1.04* 0.86 0.80     No results found for: \"ALT\", \"AST\", \"GGT\", \"ALKPHOS\", \"BILITOT\"  Lab Results   Component Value Date    TSH 0.26 (L) 08/27/2024     Lab Results   Component Value Date    LABA1C 6.4 (H) 08/25/2024       Factors impacting BG management  Factor Dose Comments   Nutrition:  Standard meals     60 grams/meal      Pain PRN plus scheduled tylenol    Other:   Kidney function  Liver function   Normal   Normal       Blood glucose pattern    Significant diabetes-related events over the past 24-72 hours  Admission ; A1c 6.4%    Assessment and Nursing Intervention   Nursing Diagnosis Risk for unstable blood glucose pattern   Nursing Intervention Domain 5250 Decision-making Support   Nursing Interventions Examined current inpatient diabetes/blood glucose control   Explored factors facilitating and impeding inpatient management  Explored corrective strategies with patient and responsible inpatient provider   Informed patient of rational for insulin strategy while hospitalized     Nursing Diagnosis 73957 Ineffective Health Management   Nursing Intervention Domain 5250 Decision-making Support   Nursing Interventions Identified diabetes self-management practices impeding diabetes control  Discussed diabetes survival skills related to  Healthy Plate eating plan; given handouts  Role of physical activity in improving insulin sensitivity and action  Procedure for blood glucose monitoring & options for low-cost products  Medications plan at discharge     Billing Code(s)   [] 68716 IP subsequent hospital care - 50 minutes   [] 11293 IP subsequent hospital care - 35 minutes   [] 83873 IP subsequent

## 2024-08-27 NOTE — PROGRESS NOTES
New patient PCP appointment set for Wednesday October 9th 2024 at 10:40 AM with Doctor Galo at their [place] location. Office asks patient to arrive 10 to 15 mins early,bring picture ID/insurance cards and list of any medications they are taking.

## 2024-08-27 NOTE — PROGRESS NOTES
Orthopaedic Progress Note  Post Op day: 1 Day Post-Op    August 27, 2024 12:10 PM     Patient: Kenyatta Santana MRN: [id number]  SSN: [id number]    YOB: 1945  Age: 78 y.o.  Sex: female      Admit date:  8/24/2024  Date of Surgery:  [unfilled]   Procedures:  Procedure(s):  LEFT BIPOLAR HIP HEMIARTHROPLASTY  Admitting Physician:  Ashlyn Amaral MD   Surgeon:  Surgeons and Role:     * Primitivo Pena MD - Primary    Consulting Physician(s): Treatment Team:   Aaron Hicks MD Graupman, Matthew S, PA Graupman, Matthew S, PA Kerr, Glenn J, Primitivo Lockhart MD Dietz, Denise M, MD Locke, Annemarie Sanchez, Morgan, Cynthia Richards, Katlyn Stallworth, Winnie Diggs, JAMES Montenegro, Beronica MCCORMACK, PT  Haroon, Jazmin Mckenzie, OT  Aminta Mcadams              SUBJECTIVE:     Kenyatta Santana is a 78 y.o. female is 1 Day Post-Op s/p Procedure(s):  LEFT BIPOLAR HIP HEMIARTHROPLASTY with an appropriate level of post-operative pain.  No complaints of nausea, vomiting, dizziness, lightheadedness, chest pain, or shortness of breath. Events noted: Pt had episode of afib post operatively after she got back to the floor. Pt converted on her own to NSR. She was transferred to [facility]    OBJECTIVE:       Physical Exam:  General: Alert, cooperative, no distress.    Respiratory: Respirations unlabored  Neurological:  Neurovascular exam within normal limits.  Motor: + DF/PF.   Musculoskeletal: Operative side extremity with swelling as expected. Calves soft, supple, non-tender upon palpation.  +DF/ +PF. DP/ PT +2. SILT with BCR in toes  Dressing/Wound:  Clean, dry and intact. No significant erythema or swelling.      Vital Signs:      Patient Vitals for the past 8 hrs:   BP Temp Temp src Pulse Resp SpO2   08/27/24 1202 112/60 -- -- -- -- --   08/27/24 1130 (!) 87/70 98.6 °F (37 °C) Oral 72 19 100 %   08/27/24 0808 132/66 99.1 °F (37.3 °C) Oral 72 20 98 %   08/27/24 0659 -- -- -- 60 -- --

## 2024-08-27 NOTE — PLAN OF CARE
assistance;Minimum assistance;Additional time;Assist X1  Bed to Chair: Contact-guard assistance;Assist X1  Toilet Transfer: Contact-guard assistance;Assist X1       Balance:      Balance  Sitting: Intact  Standing: Impaired  Standing - Static: Constant support;Good;Fair  Standing - Dynamic: Constant support;Fair    ADL Assessment:     Grooming: Stand by assistance  Grooming Skilled Clinical Factors: seated in chair simulated    UE Dressing: Stand by assistance  UE Dressing Skilled Clinical Factors: simulated    LE Dressing: Minimal assistance  LE Dressing Skilled Clinical Factors: min A kairn/doff protective undergarment (would benefit from education on LB dressing aids next tx)    Toileting: Minimal assistance;Contact guard assistance  Toileting Skilled Clinical Factors: CGA toileting hygiene, min A cloth mgmt    ADL Intervention and task modifications:      [facility] \"6 Clicks\"                                                       Daily Activity Inpatient Short Form  How much help from another person does the patient currently need... Total; A Lot A Little None   1.  Putting on and taking off regular lower body clothing? []  1 [x]  2 []  3 []  4   2.  Bathing (including washing, rinsing, drying)? []  1 []  2 [x]  3 []  4   3.  Toileting, which includes using toilet, bedpan or urinal? [] 1 []  2 [x]  3 []  4   4.  Putting on and taking off regular upper body clothing? []  1 []  2 [x]  3 []  4   5.  Taking care of personal grooming such as brushing teeth? []  1 []  2 [x]  3 []  4   6.  Eating meals? []  1 []  2 []  3 [x]  4   © 2007, Trustees of [facility], under license to [employer]. All rights reserved     Score: 18/24     Interpretation of Tool:  Represents clinically-significant functional categories (i.e. Activities of daily living).    Cutoff score 39.4 (19) correlates to a good likelihood of discharging home versus a facility  Halle Plasencia, Kenyatta Gardner, Sohail Sherwood,

## 2024-08-27 NOTE — CARE COORDINATION
2:50 pm:  CM met with daughterCarline, at bedside while patient was sleeping.  Discussed therapy recommendation for HH.  Per chart review, patient is hoping to go home, however, she does not have a current PCP which presents as a barrier to securing home health; a new patient appointment has been scheduled with Dr. Rosenthal on 10/9.  CM called Tracee Claudio with [facility] to see if they could accept patient.  She checked with [employer] but they do not have any immediate availability; she will reach out to Dr. Gayle to see if he could follow patient. Daughter, Carline, however, is more inclined for patient to go to SNF.  CM placed a referral to [facility] for review.  CM will continue to follow and assist with discharge planning.      Transition of Care Plan - RUR 14%:     Medical management continues for closed left hip fracture  Patient is being followed by PT/OT, Cardiology and Ortho  Disposition: TBD (HH vs SNF). Patient does not have a PCP  Transport at d/c: TBD  Outpatient follow-up

## 2024-08-28 LAB
GLUCOSE BLD STRIP.AUTO-MCNC: 106 MG/DL (ref 65–117)
GLUCOSE BLD STRIP.AUTO-MCNC: 140 MG/DL (ref 65–117)
GLUCOSE BLD STRIP.AUTO-MCNC: 170 MG/DL (ref 65–117)
GLUCOSE BLD STRIP.AUTO-MCNC: 177 MG/DL (ref 65–117)
SARS-COV-2 RNA RESP QL NAA+PROBE: NOT DETECTED
SERVICE CMNT-IMP: ABNORMAL
SERVICE CMNT-IMP: NORMAL
SOURCE: NORMAL

## 2024-08-28 PROCEDURE — 97116 GAIT TRAINING THERAPY: CPT

## 2024-08-28 PROCEDURE — 2060000000 HC ICU INTERMEDIATE R&B

## 2024-08-28 PROCEDURE — 6370000000 HC RX 637 (ALT 250 FOR IP): Performed by: HOSPITALIST

## 2024-08-28 PROCEDURE — 2500000003 HC RX 250 WO HCPCS: Performed by: NURSE PRACTITIONER

## 2024-08-28 PROCEDURE — 97535 SELF CARE MNGMENT TRAINING: CPT

## 2024-08-28 PROCEDURE — 6370000000 HC RX 637 (ALT 250 FOR IP): Performed by: PHYSICIAN ASSISTANT

## 2024-08-28 PROCEDURE — 6360000002 HC RX W HCPCS: Performed by: PHYSICIAN ASSISTANT

## 2024-08-28 PROCEDURE — 87635 SARS-COV-2 COVID-19 AMP PRB: CPT

## 2024-08-28 PROCEDURE — 97530 THERAPEUTIC ACTIVITIES: CPT

## 2024-08-28 PROCEDURE — 99231 SBSQ HOSP IP/OBS SF/LOW 25: CPT

## 2024-08-28 PROCEDURE — 82962 GLUCOSE BLOOD TEST: CPT

## 2024-08-28 PROCEDURE — APPNB30 APP NON BILLABLE TIME 0-30 MINS: Performed by: NURSE PRACTITIONER

## 2024-08-28 PROCEDURE — 2580000003 HC RX 258: Performed by: PHYSICIAN ASSISTANT

## 2024-08-28 RX ORDER — METOPROLOL TARTRATE 1 MG/ML
2.5 INJECTION, SOLUTION INTRAVENOUS EVERY 4 HOURS PRN
Status: DISCONTINUED | OUTPATIENT
Start: 2024-08-28 | End: 2024-09-06 | Stop reason: HOSPADM

## 2024-08-28 RX ORDER — METOPROLOL TARTRATE 1 MG/ML
2.5 INJECTION, SOLUTION INTRAVENOUS ONCE
Status: COMPLETED | OUTPATIENT
Start: 2024-08-28 | End: 2024-08-28

## 2024-08-28 RX ADMIN — FLECAINIDE ACETATE 50 MG: 100 TABLET ORAL at 20:11

## 2024-08-28 RX ADMIN — OXYCODONE HYDROCHLORIDE 5 MG: 5 TABLET ORAL at 07:50

## 2024-08-28 RX ADMIN — POLYETHYLENE GLYCOL 3350 17 G: 17 POWDER, FOR SOLUTION ORAL at 07:51

## 2024-08-28 RX ADMIN — ACETAMINOPHEN 650 MG: 325 TABLET ORAL at 10:50

## 2024-08-28 RX ADMIN — LISINOPRIL 40 MG: 20 TABLET ORAL at 07:50

## 2024-08-28 RX ADMIN — ENOXAPARIN SODIUM 40 MG: 100 INJECTION SUBCUTANEOUS at 07:49

## 2024-08-28 RX ADMIN — ACETAMINOPHEN 650 MG: 325 TABLET ORAL at 05:45

## 2024-08-28 RX ADMIN — SODIUM CHLORIDE, PRESERVATIVE FREE 10 ML: 5 INJECTION INTRAVENOUS at 07:51

## 2024-08-28 RX ADMIN — SODIUM CHLORIDE, PRESERVATIVE FREE 10 ML: 5 INJECTION INTRAVENOUS at 20:12

## 2024-08-28 RX ADMIN — DILTIAZEM HYDROCHLORIDE 360 MG: 180 CAPSULE, COATED, EXTENDED RELEASE ORAL at 07:50

## 2024-08-28 RX ADMIN — FAMOTIDINE 20 MG: 20 TABLET, FILM COATED ORAL at 07:50

## 2024-08-28 RX ADMIN — METOPROLOL TARTRATE 2.5 MG: 5 INJECTION INTRAVENOUS at 14:57

## 2024-08-28 RX ADMIN — ACETAMINOPHEN 650 MG: 325 TABLET ORAL at 23:05

## 2024-08-28 RX ADMIN — ACETAMINOPHEN 650 MG: 325 TABLET ORAL at 17:29

## 2024-08-28 RX ADMIN — METOPROLOL TARTRATE 2.5 MG: 5 INJECTION INTRAVENOUS at 20:11

## 2024-08-28 RX ADMIN — AMLODIPINE BESYLATE 5 MG: 5 TABLET ORAL at 07:50

## 2024-08-28 RX ADMIN — FLECAINIDE ACETATE 50 MG: 100 TABLET ORAL at 07:50

## 2024-08-28 RX ADMIN — SENNOSIDES AND DOCUSATE SODIUM 1 TABLET: 50; 8.6 TABLET ORAL at 07:51

## 2024-08-28 RX ADMIN — ACETAMINOPHEN 650 MG: 325 TABLET ORAL at 00:49

## 2024-08-28 ASSESSMENT — PAIN DESCRIPTION - ORIENTATION
ORIENTATION: LEFT

## 2024-08-28 ASSESSMENT — PAIN DESCRIPTION - DESCRIPTORS
DESCRIPTORS: ACHING

## 2024-08-28 ASSESSMENT — PAIN SCALES - GENERAL
PAINLEVEL_OUTOF10: 0
PAINLEVEL_OUTOF10: 0
PAINLEVEL_OUTOF10: 6
PAINLEVEL_OUTOF10: 3
PAINLEVEL_OUTOF10: 3
PAINLEVEL_OUTOF10: 2
PAINLEVEL_OUTOF10: 0
PAINLEVEL_OUTOF10: 3
PAINLEVEL_OUTOF10: 3

## 2024-08-28 ASSESSMENT — PAIN DESCRIPTION - LOCATION
LOCATION: HIP

## 2024-08-28 NOTE — PROGRESS NOTES
Orthopedic NP Progress Note  Post Op Day: 2 Days Post-Op    August 28, 2024 10:43 AM     Kenyatta Santana    Attending Physician: Treatment Team:   Aaron Hicks MD Graupman, Matthew S, PA Graupman, Matthew S, PA Kerr, Glenn J, Primitivo Lockhart, Jany Bazzi, JAMES Santana, JAMES Ambrocio Stephanie M, ROBERTO Fritz, Ijeoma, OT     Vital Signs:    Patient Vitals for the past 8 hrs:   BP Temp Temp src Pulse Resp SpO2   08/28/24 0820 -- -- -- -- 16 --   08/28/24 0750 -- -- -- -- 17 --   08/28/24 0737 123/70 98.2 °F (36.8 °C) Oral 58 17 96 %   08/28/24 0700 -- -- -- 51 -- --   08/28/24 0323 134/65 99.4 °F (37.4 °C) Oral 56 16 91 %          Intake/Output:  08/28 0701 - 08/28 1900  In: 240 [P.O.:240]  Out: 700 [Urine:700]  08/26 1901 - 08/28 0700  In: 3097 [P.O.:900; I.V.:2197]  Out: 1400 [Urine:1400]    Pain Control:        LAB:    Recent Labs     08/27/24 0328   HCT 36.1   HGB 12.1     Lab Results   Component Value Date/Time     08/27/2024 03:28 AM    K 4.0 08/27/2024 03:28 AM     08/27/2024 03:28 AM    CO2 25 08/27/2024 03:28 AM    BUN 22 08/27/2024 03:28 AM       Subjective:  Kenyatta Santana is a 78 y.o. female s/p a  Procedure(s):  LEFT BIPOLAR HIP HEMIARTHROPLASTY   Procedure(s):  LEFT BIPOLAR HIP HEMIARTHROPLASTY. Tolerating diet. Pt up in chair and reports she has been ambulating with PT, family at bedside, pain is well managed        Objective: General: alert, cooperative, no distress.    Neuro/Vascular: CNS Intact.  Sensation stable. Brisk cap refill, 2+ pulses UE/LE  Musculoskeletal:  LLE - + ROM with mild expected pot op edema, DF/PF, NVI     Skin: warm and dry   Dressing - clean, dry and intact.              PT/OT:   Gait:                      Assessment:    s/p Procedure(s):  LEFT BIPOLAR HIP HEMIARTHROPLASTY    Principal Problem:    Closed left hip fracture, initial encounter ([facility])  Active Problems:    Displaced fracture of left femoral neck (HCC)    Prediabetes    Closed fracture

## 2024-08-28 NOTE — PLAN OF CARE
Problem: Physical Therapy - Adult  Goal: By Discharge: Performs mobility at highest level of function for planned discharge setting.  See evaluation for individualized goals.  Description: FUNCTIONAL STATUS PRIOR TO ADMISSION: Patient was independent without DME.    HOME SUPPORT PRIOR TO ADMISSION: The patient lived alone with daughter close by to provide assistance if needed.    Physical Therapy Goals  Initiated 8/27/2024  1.  Patient will move from supine to sit and sit to supine, scoot up and down, and roll side to side in bed with modified independence within 4 day(s).    2.  Patient will perform sit to stand with modified independence within 4 day(s).  3.  Patient will transfer from bed to chair and chair to bed with modified independence using the least restrictive device within 4 day(s).  4.  Patient will ambulate with modified independence for 150 feet with the least restrictive device within 4 day(s).   5.  Patient will perform LE home exercise program per protocol with modified independence within 4 days.      8/28/2024 1540 by Arielle Salazar, PTA  Outcome: Progressing  8/28/2024 1036 by Arielle Salazar, PTA  Outcome: Progressing   PHYSICAL THERAPY TREATMENT    Patient: Kenyatta Santana (78 y.o. female)  Date: 8/28/2024  Diagnosis: Closed fracture of neck of left femur, initial encounter ([facility]) [S72.002A]  Closed left hip fracture, initial encounter ([facility]) [S72.002A]  Displaced fracture of left femoral neck ([facility]) [S72.002A] Closed left hip fracture, initial encounter ([facility])  Procedure(s) (LRB):  LEFT BIPOLAR HIP HEMIARTHROPLASTY (Left) 2 Days Post-Op  Precautions:                        ASSESSMENT:  Patient continues to benefit from skilled PT services and is progressing towards goals.   this afternoon sitting in chair, pre activity. Pt reports good pain control. Limited activity 2/2 's with transfer into BR, RN at bedside. Denies SOB, no instability noted during upright activity. HR 84 at    Neuro Re-Education:                                                                                                                                                                                                                                         Intervention/Education specific to: \"hip replacement\"    Reviewed education regarding posterior precautions. Patient able to state 3 when prompted    SUPINE  EXERCISES   Sets   Reps   Active Active Assist   Passive Self ROM   Comments   Ankle Pumps   [x]                                        []                                        []                                        []                                           Quad Sets   [x]                                        []                                        []                                        []                                           Heel Slides   [x]                                        []                                        []                                        []                                           Glut Sets   [x]                                        []                                        []                                        []                                              []                                        []                                        []                                        []                                              []                                        []                                        []                                        []                                             STANDING  EXERCISES   Sets   Reps   Active Active Assist   Passive Self ROM   Comments   Heel Raises   [x]                                        []                                        []                                        []                                           Hip Abduction   []                                        []

## 2024-08-28 NOTE — PROGRESS NOTES
1120 Called Cardiology office to let on call practitioner know that patient converted back to A fib with RVR. HR .     1420 Cardiology at bedside added a one time does Lopressor 2.5 mg IV push. PRN lopressor 2.5 mg added for HR above 110 q4h.

## 2024-08-28 NOTE — PLAN OF CARE
Problem: Physical Therapy - Adult  Goal: By Discharge: Performs mobility at highest level of function for planned discharge setting.  See evaluation for individualized goals.  Description: FUNCTIONAL STATUS PRIOR TO ADMISSION: Patient was independent without DME.    HOME SUPPORT PRIOR TO ADMISSION: The patient lived alone with daughter close by to provide assistance if needed.    Physical Therapy Goals  Initiated 8/27/2024  1.  Patient will move from supine to sit and sit to supine, scoot up and down, and roll side to side in bed with modified independence within 4 day(s).    2.  Patient will perform sit to stand with modified independence within 4 day(s).  3.  Patient will transfer from bed to chair and chair to bed with modified independence using the least restrictive device within 4 day(s).  4.  Patient will ambulate with modified independence for 150 feet with the least restrictive device within 4 day(s).   5.  Patient will perform LE home exercise program per protocol with modified independence within 4 days.      Outcome: Progressing   PHYSICAL THERAPY TREATMENT    Patient: Kenyatta Santana (78 y.o. female)  Date: 8/28/2024  Diagnosis: Closed fracture of neck of left femur, initial encounter ([facility]) [S72.002A]  Closed left hip fracture, initial encounter ([facility]) [S72.002A]  Displaced fracture of left femoral neck ([facility]) [S72.002A] Closed left hip fracture, initial encounter ([facility])  Procedure(s) (LRB):  LEFT BIPOLAR HIP HEMIARTHROPLASTY (Left) 2 Days Post-Op  Precautions:                        ASSESSMENT:  Patient continues to benefit from skilled PT services and is slowly progressing towards goals. Up to Min A for sit<>stand transfers with RW support, no instability noted with gait training on level surfaces. HR  during activity , denies SOB or feeling \"heart racing\".  Pt returned to chair to work with OT. Pt has zero stairs at her home and states daughter works from home and is able to stay with her upon          Pain Ratin/10   Pain Intervention(s):   patient medicated for pain prior to session and rest    Activity Tolerance:   Good    After treatment:   Patient left in no apparent distress sitting up in chair, Call bell within reach, and Bed/ chair alarm activated      COMMUNICATION/EDUCATION:   The patient's plan of care was discussed with: occupational therapist and registered nurse    Patient Education  Education Given To: Patient  Education Provided: Role of Therapy;Home Exercise Program;Transfer Training;Precautions  Education Method: Verbal  Barriers to Learning: None  Education Outcome: Continued education needed      Arielle Salazar, PTA  Minutes: 23

## 2024-08-28 NOTE — PROGRESS NOTES
Notified by nurse that pt went back into afib this a.m. with RVR.  D/w Dr. Kennedy. Ordered Low dose IV metoprolol now and q 4 hour prn. Pt seemingly asymptomatic with no palpitations or SOB.

## 2024-08-28 NOTE — CARE COORDINATION
4:27 PM  Patient has been accepted by [facility] for admission tomorrow (8/28/24) in the early afternoon. CM messaged the attending to notify and request a COVID test for placement. CM updated patient and patients daughter and they are in agreement with the plan. The patients daughter will provide transport tomorrow to [facility].     Annemarie Chauhan, MSW  [facility]      Available via [employer]      2:48 PM  CM received back a possible yes from [facility] for rehab- they are leaning towards a yes but requested a chest xray and covid test and questioned if there was a treatment plan for the renal cell carcinoma found on imaging- it appears they are looking at a MRI from 2020-CM sent a message back to [facility] to update them and sent the requested chest xray- awaiting response for final decision. KL    8/28/24   11:44 AM      Case Management Daily Progress Note     Received and reviewed handoff from previous CM.      Reason for Admission:      1. Closed fracture of neck of left femur, initial encounter ([facility])    2. Paroxysmal atrial fibrillation ([facility])          RUR: 11%  LOS: 4      Transition of care plan:    1). Specialists consulted: Orthopedics, cardiology  2). PT/OT recommendations: HH  3). Transportation at dc: Daughter  4). CM following for dc needs    Patient reviewed in IDR. Patient had a hip repair on 8/26 with ortho, has been working with PT/OT and home health is recommended. Previous CM had reached out to [facility] care for home health in case patient goes home with home health- they can accept if Dr. Pena will follow for orders since patient does not currently have a PCP- received communication that they would if patient goes home with home health.     CM spoke to patient and she prefers going to short term rehab before going home due to recent hip repair and afib concerns. A referral had been sent to [facility] on 8/27/24. [facility] is reviewing, CM left a VM to

## 2024-08-28 NOTE — PLAN OF CARE
complete;Adaptive equipment  LE Dressing Skilled Clinical Factors: provided AE training with patient demonstrating good understanding, doffing socks w/ dressing stick, donning socks via sock aid, and donning protective brief via reacher; patient able to use BUEs to pull over hips in standing w/p LOB    Toileting: Contact guard assistance;Minimal assistance  Toileting Skilled Clinical Factors: CGA standing anterior and posterior hygiene (after removing soiled brief); min assist for gown and line management; able to remove BUE support for task engagement    Functional Mobility: Contact guard assistance;Increased time to complete;Adaptive equipment    Precautions: Patient recalled and demonstrated avoiding extreme planes of movement with left LE during ADLs and functional mobility with minimal cues.       Dressing joint: Patient instructed and demonstrated understanding to don/doff left LE first/last with minimal cues. Patient instructed and demonstrated to don all clothing while sitting prior to standing, doff all clothing to knees while standing, then sit to doff clothing off from knees to feet to facilitate fall prevention, pain management, and energy conservation with Minimal Assist    Home safety: Patient instructed on home modifications and safety (raise height of ADL objects, appropriate height of chair surfaces, recliner safety, change of floor surfaces, clear pathways) to increase independence and fall prevention.  Patient indicated understanding.     Standing: Patient instructed and demonstrated to walk up to sink/countertop/surfaces, step into walker to increase safety of joint and fall prevention with Contact Guard Assist. Instructed to apply concept of hip contraindications to ADLs within the home (no extreme reaching across body, square off while using objects, slide objects along surfaces).  Patient instructed to increase amount of time standing, observe standing position during ADLs to increase even  weight bearing through bilateral LEs to increase independence with ADLs.  Goal to be reached 30 days post - op, per orthopedic surgeon or per PT.  Patient indicated understanding.     Pain Rating:  Tolerates session well.      Pain Intervention(s):   nursing notified, rest, and repositioning    Activity Tolerance:   Fair   Please refer to the flowsheet for vital signs taken during this treatment.    After treatment:   Patient left in no apparent distress sitting up in chair, Call bell within reach, and Bed/ chair alarm activated    COMMUNICATION/EDUCATION:   The patient's plan of care was discussed with: physical therapist and registered nurse    Patient Education  Education Given To: Patient  Education Provided: Role of Therapy;Energy Conservation;Fall Prevention Strategies;Plan of Care;Precautions;ADL Adaptive Strategies;Equipment;Transfer Training  Education Provided Comments: AE training  Education Method: Verbal;Demonstration  Barriers to Learning: None  Education Outcome: Verbalized understanding;Demonstrated understanding;Continued education needed    Thank you for this referral.  Ijeoma Fritz OT  Minutes: 26

## 2024-08-28 NOTE — CONSULTS
cooperative  Neuro  Alert, oriented   Vital Signs   Vitals:    08/28/24 1115   BP: 126/72   Pulse: (!) 115   Resp: 19   Temp: 98.6 °F (37 °C)   SpO2: 98%       Laboratory  Recent Labs     08/27/24  0328   HGB 12.1   HCT 36.1     Recent Labs     08/26/24  1551 08/27/24  0328    142   K 4.5 4.0   * 112*   CO2 24 25   BUN 13 22*   CREATININE 0.86 0.80     No results found for: \"ALT\", \"AST\", \"GGT\", \"ALKPHOS\", \"BILITOT\"  Lab Results   Component Value Date    TSH 0.26 (L) 08/27/2024     Lab Results   Component Value Date    LABA1C 6.4 (H) 08/25/2024       Factors impacting BG management  Factor Dose Comments   Nutrition:  Standard meals     60 grams/meal      Pain PRN plus scheduled tylenol    Other:   Kidney function  Liver function   Normal   Normal       Blood glucose pattern    Significant diabetes-related events over the past 24-72 hours  Admission ; A1c 6.4%      Assessment and Nursing Intervention   Nursing Diagnosis Risk for unstable blood glucose pattern   Nursing Intervention Domain 5250 Decision-making Support   Nursing Interventions Examined current inpatient diabetes/blood glucose control   Explored factors facilitating and impeding inpatient management  Explored corrective strategies with patient and responsible inpatient provider   Informed patient of rational for insulin strategy while hospitalized     Nursing Diagnosis 94642 Ineffective Health Management   Nursing Intervention Domain 5250 Decision-making Support   Nursing Interventions Identified diabetes self-management practices impeding diabetes control  Discussed diabetes survival skills related to  Healthy Plate eating plan; given handouts  Role of physical activity in improving insulin sensitivity and action  Procedure for blood glucose monitoring & options for low-cost products  Medications plan at discharge     Billing Code(s)   [] 68112 IP subsequent hospital care - 50 minutes   [] 40079 IP subsequent hospital care - 35  minutes   [x] 58752 IP subsequent hospital care - 25 minutes   [] 63604 IP initial hospital care - 40 minutes     Before making these care recommendations, I personally reviewed the hospitalization record, including notes, laboratory & diagnostic data and current medications, and examined the patient at the bedside.  Total minutes: 25    NIKIA LINN - CNS  Diabetes Clinical Nurse Specialist  [facility]  Access via [employer]

## 2024-08-28 NOTE — PROGRESS NOTES
[facility]  [address] [place]  [address] [phone]         Hospitalist Progress Note        NAME: Kenyatta Santana   :  1945   MRN:  [id number]    Date:  2024     Patient PCP: No primary care provider on file.    Code Status: Full Code     Isolation Precautions: No active isolations    Barrier(s) to discharge:  Currently not medically stable for discharge and Needs treatment/procedures left hip repair  Estimated date of discharge:   3-4 days  Discharge disposition:  [facility] or SNF/LTC    Assessment/Plan:      POD 2 Days Post-Op  Procedure: * No surgery found *    Paroxysmal atrial fibrillation with RVR  Secondary hypercoagulable state due to A-fib  Long-term anticoagulation with Eliquis  Back to A-fib with RVR from - but asymptomatic  On  HR upto 200 and sustating in 170s AFIB RVR Rapid response called treated and converted with IV lopressor  Continue oral flecainide and diltiazem  Eliquis on hold can restart when cleared by Ortho  Cardiology note reviewed and appreciated.  Has added low-dose metoprolol for recurrent A-fib with RVR    Left femoral neck fracture due to mechanical fall, POA  Surgery being held off for 48 hours off Eliquis  Pain control according to intensity Tylenol: mild pain, Roxicodone: mod pain, and Dilaudid IV: severe pain.  Preop echo EF 55% but had abnormal diastolic function    Leukocytosis, likely reactive d/t fracture, POA  No s/s of infection  Monitor closely    HTN  Continue lisinopril and amlodipine  Change diltiazem from 260-360 which was confirmed by pharmacy that this medication is given along with amlodipine.    Probable DM type II (undiagnosed) with hyperglycemia 230, POA  Started on insulin and consider changing to p.o. on discharge  A1c 6.4  Diabetic teaching    Questionable CKD stage IIIa  Patient unaware of kidney problems  Avoid nephrotoxic agents much as possible and monitor labs.  Check labs for CKD to  insulin lispro (HUMALOG,ADMELOG) injection vial 0-4 Units  0-4 Units SubCUTAneous Nightly    sodium chloride 0.9 % bolus 500 mL  500 mL IntraVENous Once    sodium chloride flush 0.9 % injection 5-40 mL  5-40 mL IntraVENous 2 times per day    sodium chloride flush 0.9 % injection 5-40 mL  5-40 mL IntraVENous PRN    0.9 % sodium chloride infusion   IntraVENous PRN    acetaminophen (TYLENOL) tablet 650 mg  650 mg Oral Q6H    oxyCODONE (ROXICODONE) immediate release tablet 5 mg  5 mg Oral Q3H PRN    Or    oxyCODONE (ROXICODONE) immediate release tablet 10 mg  10 mg Oral Q3H PRN    HYDROmorphone (DILAUDID) injection 0.5 mg  0.5 mg IntraVENous Q3H PRN    Or    HYDROmorphone HCl PF (DILAUDID) injection 1 mg  1 mg IntraVENous Q3H PRN    polyethylene glycol (GLYCOLAX) packet 17 g  17 g Oral Daily    bisacodyl (DULCOLAX) EC tablet 5 mg  5 mg Oral Daily PRN    bisacodyl (DULCOLAX) suppository 10 mg  10 mg Rectal Daily PRN    ondansetron (ZOFRAN-ODT) disintegrating tablet 4 mg  4 mg Oral Q8H PRN    Or    ondansetron (ZOFRAN) injection 4 mg  4 mg IntraVENous Q6H PRN    famotidine (PEPCID) tablet 20 mg  20 mg Oral Daily    Or    famotidine (PEPCID) 20 mg in sodium chloride (PF) 0.9 % 10 mL injection  20 mg IntraVENous Daily    diphenhydrAMINE (BENADRYL) capsule 25 mg  25 mg Oral Q6H PRN    Or    diphenhydrAMINE (BENADRYL) injection 25 mg  25 mg IntraVENous Q6H PRN    enoxaparin (LOVENOX) injection 40 mg  40 mg SubCUTAneous Daily    amLODIPine (NORVASC) tablet 5 mg  5 mg Oral Daily    flecainide (TAMBOCOR) tablet 50 mg  50 mg Oral BID    lisinopril (PRINIVIL;ZESTRIL) tablet 40 mg  40 mg Oral Daily    glucose chewable tablet 16 g  4 tablet Oral PRN    dextrose bolus 10% 125 mL  125 mL IntraVENous PRN    Or    dextrose bolus 10% 250 mL  250 mL IntraVENous PRN    glucagon injection 1 mg  1 mg SubCUTAneous PRN    dextrose 10 % infusion   IntraVENous Continuous PRN    oxyCODONE (ROXICODONE) immediate release tablet 10 mg  10 mg Oral

## 2024-08-29 LAB
GLUCOSE BLD STRIP.AUTO-MCNC: 130 MG/DL (ref 65–117)
GLUCOSE BLD STRIP.AUTO-MCNC: 148 MG/DL (ref 65–117)
GLUCOSE BLD STRIP.AUTO-MCNC: 163 MG/DL (ref 65–117)
GLUCOSE BLD STRIP.AUTO-MCNC: 181 MG/DL (ref 65–117)
SERVICE CMNT-IMP: ABNORMAL

## 2024-08-29 PROCEDURE — 82962 GLUCOSE BLOOD TEST: CPT

## 2024-08-29 PROCEDURE — 6370000000 HC RX 637 (ALT 250 FOR IP): Performed by: PHYSICIAN ASSISTANT

## 2024-08-29 PROCEDURE — APPSS30 APP SPLIT SHARED TIME 16-30 MINUTES: Performed by: NURSE PRACTITIONER

## 2024-08-29 PROCEDURE — 99232 SBSQ HOSP IP/OBS MODERATE 35: CPT | Performed by: INTERNAL MEDICINE

## 2024-08-29 PROCEDURE — 2500000003 HC RX 250 WO HCPCS: Performed by: NURSE PRACTITIONER

## 2024-08-29 PROCEDURE — 2580000003 HC RX 258: Performed by: PHYSICIAN ASSISTANT

## 2024-08-29 PROCEDURE — 94761 N-INVAS EAR/PLS OXIMETRY MLT: CPT

## 2024-08-29 PROCEDURE — 97530 THERAPEUTIC ACTIVITIES: CPT

## 2024-08-29 PROCEDURE — 97116 GAIT TRAINING THERAPY: CPT

## 2024-08-29 PROCEDURE — 6370000000 HC RX 637 (ALT 250 FOR IP): Performed by: HOSPITALIST

## 2024-08-29 PROCEDURE — 97535 SELF CARE MNGMENT TRAINING: CPT

## 2024-08-29 PROCEDURE — 6370000000 HC RX 637 (ALT 250 FOR IP): Performed by: NURSE PRACTITIONER

## 2024-08-29 PROCEDURE — 97110 THERAPEUTIC EXERCISES: CPT

## 2024-08-29 PROCEDURE — 2060000000 HC ICU INTERMEDIATE R&B

## 2024-08-29 PROCEDURE — 6360000002 HC RX W HCPCS: Performed by: PHYSICIAN ASSISTANT

## 2024-08-29 PROCEDURE — 93005 ELECTROCARDIOGRAM TRACING: CPT | Performed by: INTERNAL MEDICINE

## 2024-08-29 RX ORDER — METOPROLOL TARTRATE 25 MG/1
25 TABLET, FILM COATED ORAL 2 TIMES DAILY
Status: DISCONTINUED | OUTPATIENT
Start: 2024-08-29 | End: 2024-08-30

## 2024-08-29 RX ADMIN — SODIUM CHLORIDE, PRESERVATIVE FREE 10 ML: 5 INJECTION INTRAVENOUS at 08:48

## 2024-08-29 RX ADMIN — ACETAMINOPHEN 650 MG: 325 TABLET ORAL at 04:21

## 2024-08-29 RX ADMIN — FAMOTIDINE 20 MG: 20 TABLET, FILM COATED ORAL at 08:45

## 2024-08-29 RX ADMIN — SODIUM CHLORIDE, PRESERVATIVE FREE 10 ML: 5 INJECTION INTRAVENOUS at 20:39

## 2024-08-29 RX ADMIN — FLECAINIDE ACETATE 50 MG: 100 TABLET ORAL at 20:39

## 2024-08-29 RX ADMIN — ACETAMINOPHEN 650 MG: 325 TABLET ORAL at 23:48

## 2024-08-29 RX ADMIN — METOPROLOL TARTRATE 25 MG: 25 TABLET, FILM COATED ORAL at 08:46

## 2024-08-29 RX ADMIN — DILTIAZEM HYDROCHLORIDE 360 MG: 180 CAPSULE, COATED, EXTENDED RELEASE ORAL at 08:45

## 2024-08-29 RX ADMIN — SENNOSIDES AND DOCUSATE SODIUM 1 TABLET: 50; 8.6 TABLET ORAL at 08:45

## 2024-08-29 RX ADMIN — FLECAINIDE ACETATE 50 MG: 100 TABLET ORAL at 08:47

## 2024-08-29 RX ADMIN — POLYETHYLENE GLYCOL 3350 17 G: 17 POWDER, FOR SOLUTION ORAL at 08:45

## 2024-08-29 RX ADMIN — METOPROLOL TARTRATE 25 MG: 25 TABLET, FILM COATED ORAL at 20:39

## 2024-08-29 RX ADMIN — ACETAMINOPHEN 650 MG: 325 TABLET ORAL at 18:18

## 2024-08-29 RX ADMIN — METOPROLOL TARTRATE 2.5 MG: 5 INJECTION INTRAVENOUS at 04:21

## 2024-08-29 RX ADMIN — ACETAMINOPHEN 650 MG: 325 TABLET ORAL at 11:42

## 2024-08-29 RX ADMIN — ENOXAPARIN SODIUM 40 MG: 100 INJECTION SUBCUTANEOUS at 08:44

## 2024-08-29 ASSESSMENT — PAIN SCALES - GENERAL
PAINLEVEL_OUTOF10: 0
PAINLEVEL_OUTOF10: 1
PAINLEVEL_OUTOF10: 0

## 2024-08-29 ASSESSMENT — PAIN DESCRIPTION - DESCRIPTORS: DESCRIPTORS: ACHING

## 2024-08-29 ASSESSMENT — PAIN DESCRIPTION - ORIENTATION: ORIENTATION: LEFT

## 2024-08-29 ASSESSMENT — PAIN DESCRIPTION - LOCATION: LOCATION: HIP

## 2024-08-29 NOTE — PROGRESS NOTES
Or    HYDROmorphone HCl PF (DILAUDID) injection 1 mg  1 mg IntraVENous Q3H PRN    polyethylene glycol (GLYCOLAX) packet 17 g  17 g Oral Daily    bisacodyl (DULCOLAX) EC tablet 5 mg  5 mg Oral Daily PRN    bisacodyl (DULCOLAX) suppository 10 mg  10 mg Rectal Daily PRN    ondansetron (ZOFRAN-ODT) disintegrating tablet 4 mg  4 mg Oral Q8H PRN    Or    ondansetron (ZOFRAN) injection 4 mg  4 mg IntraVENous Q6H PRN    famotidine (PEPCID) tablet 20 mg  20 mg Oral Daily    Or    famotidine (PEPCID) 20 mg in sodium chloride (PF) 0.9 % 10 mL injection  20 mg IntraVENous Daily    diphenhydrAMINE (BENADRYL) capsule 25 mg  25 mg Oral Q6H PRN    Or    diphenhydrAMINE (BENADRYL) injection 25 mg  25 mg IntraVENous Q6H PRN    enoxaparin (LOVENOX) injection 40 mg  40 mg SubCUTAneous Daily    amLODIPine (NORVASC) tablet 5 mg  5 mg Oral Daily    flecainide (TAMBOCOR) tablet 50 mg  50 mg Oral BID    lisinopril (PRINIVIL;ZESTRIL) tablet 40 mg  40 mg Oral Daily    glucose chewable tablet 16 g  4 tablet Oral PRN    dextrose bolus 10% 125 mL  125 mL IntraVENous PRN    Or    dextrose bolus 10% 250 mL  250 mL IntraVENous PRN    glucagon injection 1 mg  1 mg SubCUTAneous PRN    dextrose 10 % infusion   IntraVENous Continuous PRN    sennosides-docusate sodium (SENOKOT-S) 8.6-50 MG tablet 1 tablet  1 tablet Oral Daily    dilTIAZem (CARDIZEM CD) extended release capsule 360 mg  360 mg Oral Daily    hydrALAZINE (APRESOLINE) injection 10 mg  10 mg IntraVENous Q6H PRN    sodium chloride flush 0.9 % injection 5-40 mL  5-40 mL IntraVENous 2 times per day    sodium chloride flush 0.9 % injection 5-40 mL  5-40 mL IntraVENous PRN    0.9 % sodium chloride infusion   IntraVENous PRN    potassium chloride (KLOR-CON) extended release tablet 40 mEq  40 mEq Oral PRN    Or    potassium bicarb-citric acid (EFFER-K) effervescent tablet 40 mEq  40 mEq Oral PRN    Or    potassium chloride 10 mEq/100 mL IVPB (Peripheral Line)  10 mEq IntraVENous PRN    magnesium

## 2024-08-29 NOTE — PLAN OF CARE
Problem: Physical Therapy - Adult  Goal: By Discharge: Performs mobility at highest level of function for planned discharge setting.  See evaluation for individualized goals.  Description: FUNCTIONAL STATUS PRIOR TO ADMISSION: Patient was independent without DME.    HOME SUPPORT PRIOR TO ADMISSION: The patient lived alone with daughter close by to provide assistance if needed.    Physical Therapy Goals  Initiated 8/27/2024  1.  Patient will move from supine to sit and sit to supine, scoot up and down, and roll side to side in bed with modified independence within 4 day(s).    2.  Patient will perform sit to stand with modified independence within 4 day(s).  3.  Patient will transfer from bed to chair and chair to bed with modified independence using the least restrictive device within 4 day(s).  4.  Patient will ambulate with modified independence for 150 feet with the least restrictive device within 4 day(s).   5.  Patient will perform LE home exercise program per protocol with modified independence within 4 days.      Outcome: Progressing     PHYSICAL THERAPY TREATMENT    Patient: Kenyatta Santana (78 y.o. female)  Date: 8/29/2024  Diagnosis: Closed fracture of neck of left femur, initial encounter ([facility]) [S72.002A]  Closed left hip fracture, initial encounter ([facility]) [S72.002A]  Displaced fracture of left femoral neck ([facility]) [S72.002A] Closed left hip fracture, initial encounter ([facility])  Procedure(s) (LRB):  LEFT BIPOLAR HIP HEMIARTHROPLASTY (Left) 3 Days Post-Op  Precautions:                        ASSESSMENT:  Patient continues to benefit from skilled PT services and is progressing towards goals. Patient reports she just got back into bed with nursing assistance to rest. She is agreeable to bilateral lower extremity therapeutic exercises for strengthening and ROM at this time. Patient with fair left lower extremity strength needing intermittent assist for AAROM. Reviewed pain/edema management and assisted with

## 2024-08-29 NOTE — PLAN OF CARE
Problem: Occupational Therapy - Adult  Goal: By Discharge: Performs self-care activities at highest level of function for planned discharge setting.  See evaluation for individualized goals.  Description: FUNCTIONAL STATUS PRIOR TO ADMISSION:  Patient is independent for self care and functional transfers/mobility.     HOME SUPPORT: Patient lived alone however with good support via daughter and niece that live close by. Pt stating daughter may be able to stay with pt upon discharge.     Occupational Therapy Goals:  Initiated 8/27/2024  1.  Patient will perform grooming with Modified [place] within 7 day(s).  2.  Patient will perform upper body dressing with [place] within 7 day(s).  3.  Patient will perform lower body dressing with Modified [place] within 7 day(s).  4.  Patient will perform toilet transfers with Modified [place]  within 7 day(s).  5.  Patient will perform all aspects of toileting with Modified [place] within 7 day(s).  6.  Patient will participate in upper extremity therapeutic exercise/activities with [place] for 10 minutes within 7 day(s).    7.  Patient will utilize energy conservation techniques during functional activities with verbal cues within 7 day(s).    Outcome: Progressing   OCCUPATIONAL THERAPY TREATMENT  Patient: Kenyatta Santana (78 y.o. female)  Date: 8/29/2024  Primary Diagnosis: Closed fracture of neck of left femur, initial encounter ([facility]) [S72.002A]  Closed left hip fracture, initial encounter ([facility]) [S72.002A]  Displaced fracture of left femoral neck ([facility]) [S72.002A]  Procedure(s) (LRB):  LEFT BIPOLAR HIP HEMIARTHROPLASTY (Left) 3 Days Post-Op   Precautions:                  Chart, occupational therapy assessment, plan of care, and goals were reviewed.    ASSESSMENT  Patient continues to benefit from skilled OT services and is progressing towards goals. Patient received up in chair voicing need for restroom, good progress with sit<>stand transfers today  able to complete multiple with CGA only. BP soft but stable and elevates with mobility, HR stable high 80s to max 110 with mobility today though still reading in afib. She is incontinent of bowel on the way to restroom and needs assistance for cleaning the back of legs after toileting. Improved ability to complete toileting hygiene with ability to tolerate prolonged stand of >5 minutes for wiping and LE bathing with unilateral and at times no UE support. Noted small blood clot on wipe during hygiene, RN alerted at end of session.          PLAN :  Patient continues to benefit from skilled intervention to address the above impairments.  Continue treatment per established plan of care to address goals.    Recommend with staff: OOB to recliner as tolerated. OOB to BSC/bathroom with assist x1 and RW for safety    Recommendation for discharge: (in order for the patient to meet his/her long term goals):   Moderate intensity short-term skilled occupational therapy up to 5x/week    Other factors to consider for discharge: lives alone, available support system works or is unable to provide adequate supervision and the patient would be alone, high risk for falls, and concern for safely navigating or managing the home environment    IF patient discharges home will need the following DME: continuing to assess with progress       SUBJECTIVE:   Patient stated “I have to have a bowel movement they've been having me on that Miralax.”    OBJECTIVE DATA SUMMARY:   Cognitive/Behavioral Status:  Orientation  Overall Orientation Status: Within Functional Limits  Orientation Level: Oriented X4  Cognition  Arousal/Alertness: Appears intact  Following Commands: Follows multistep commands with increased time  Attention Span: Attends with cues to redirect  Insights: Fully aware of deficits  Initiation: Does not require cues  Sequencing: Does not require cues    Functional Mobility and Transfers for ADLs:  Bed Mobility:  Bed Mobility

## 2024-08-29 NOTE — PROGRESS NOTES
1100: Therapy assisted pt to bathroom for urine and bowel movement. Noted a small amount of blood on toilet after wiping- unsure where is originated. Notified Dr. Hicks.

## 2024-08-29 NOTE — PROGRESS NOTES
0410 - RRT nurse on the floor and noted that patient seemed to be in a lethal rhythm. This RN immediately in patients room to assess. Patient is asymptomatic and talking to me. HR is maintaining in 130s to 140s. RRT nurse saw the HR at 180s prior to coming into room.    0415 - EKG obtained by this RN and scanned into chart.    0420 - PRN lopressor given to patient per order parameters.    0432 - This RN notified CARLOS Yip about patients status and medications given. No new orders at this time.

## 2024-08-29 NOTE — PROGRESS NOTES
Orthopedic NP Progress Note  Post Op Day: 3 Days Post-Op    August 29, 2024 9:06 AM     Kenyatta Santana    Attending Physician: Treatment Team:   Aaron Hicks MD Graupman, Matthew S, PA Graupman, Matthew S, PA Kerr, Glenn J, Primitivo Lockhart MD Dietz, Denise M, MD Babson, Isabelle, OT  Ju Qureshi, Rosy Mitchell     Vital Signs:    Patient Vitals for the past 8 hrs:   BP Temp Temp src Pulse Resp   08/29/24 0828 115/69 98.2 °F (36.8 °C) Oral (!) 120 18   08/29/24 0347 (!) 150/81 98 °F (36.7 °C) Oral -- --          Intake/Output:  08/29 0701 - 08/29 1900  In: -   Out: 600 [Urine:600]  08/27 1901 - 08/29 0700  In: 540 [P.O.:540]  Out: 3100 [Urine:3100]    Pain Control:        LAB:    Recent Labs     08/27/24  0328   HCT 36.1   HGB 12.1     Lab Results   Component Value Date/Time     08/27/2024 03:28 AM    K 4.0 08/27/2024 03:28 AM     08/27/2024 03:28 AM    CO2 25 08/27/2024 03:28 AM    BUN 22 08/27/2024 03:28 AM       Subjective:  Kenyatta Santana is a 78 y.o. female s/p a  Procedure(s):  LEFT BIPOLAR HIP HEMIARTHROPLASTY   Procedure(s):  LEFT BIPOLAR HIP HEMIARTHROPLASTY. Tolerating diet. Pt up in chair and reports she has been ambulating with PT, family at bedside, pain is well managed          Objective: General: alert, cooperative, no distress.    Neuro/Vascular: CNS Intact.  Sensation stable. Brisk cap refill, 2+ pulses UE/LE  Musculoskeletal:  LLE - + ROM with mild expected pot op edema, DF/PF, NVI     Skin: warm and dry   Dressing - clean, dry and intact.                PT/OT:   Gait:                      Assessment:    s/p Procedure(s):  LEFT BIPOLAR HIP HEMIARTHROPLASTY    Principal Problem:    Closed left hip fracture, initial encounter ([facility])  Active Problems:    Displaced fracture of left femoral neck (HCC)    Prediabetes    Closed fracture of neck of left femur (HCC)  Resolved Problems:    * No resolved hospital problems. *       Plan:   -  PT/OT - WBAT with walker LLE   -  Pain

## 2024-08-29 NOTE — PLAN OF CARE
Problem: Physical Therapy - Adult  Goal: By Discharge: Performs mobility at highest level of function for planned discharge setting.  See evaluation for individualized goals.  Description: FUNCTIONAL STATUS PRIOR TO ADMISSION: Patient was independent without DME.    HOME SUPPORT PRIOR TO ADMISSION: The patient lived alone with daughter close by to provide assistance if needed.    Physical Therapy Goals  Initiated 8/27/2024  1.  Patient will move from supine to sit and sit to supine, scoot up and down, and roll side to side in bed with modified independence within 4 day(s).    2.  Patient will perform sit to stand with modified independence within 4 day(s).  3.  Patient will transfer from bed to chair and chair to bed with modified independence using the least restrictive device within 4 day(s).  4.  Patient will ambulate with modified independence for 150 feet with the least restrictive device within 4 day(s).   5.  Patient will perform LE home exercise program per protocol with modified independence within 4 days.      8/29/2024 1544 by Ju Qureshi, PT  Outcome: Progressing  8/29/2024 1345 by Ju Qureshi, PT  Outcome: Progressing     PHYSICAL THERAPY TREATMENT    Patient: Kenyatta Santana (78 y.o. female)  Date: 8/29/2024  Diagnosis: Closed fracture of neck of left femur, initial encounter ([facility]) [S72.002A]  Closed left hip fracture, initial encounter ([facility]) [S72.002A]  Displaced fracture of left femoral neck ([facility]) [S72.002A] Closed left hip fracture, initial encounter ([facility])  Procedure(s) (LRB):  LEFT BIPOLAR HIP HEMIARTHROPLASTY (Left) 3 Days Post-Op  Precautions:                        ASSESSMENT:  Patient continues to benefit from skilled PT services and is progressing towards goals. Patient with increased difficulty to stand from edge of bed needing repeated trials and minimal assist x 2 trials, able to stand more easily from higher chair at contact guard assist level. Patient ambulated within her room for 2  trials with rolling walker at contact guard assist level. Patient asymptomatic, HR 63-70 throughout mobility. Patient actively participated in lower extremity exercises.         PLAN:  Patient continues to benefit from skilled intervention to address the above impairments.  Continue treatment per established plan of care.    Recommend with staff: therapy recommendations for staff: Recommend mobility with staff assist x1 using gait belt and rolling walker.    Recommend for next PT session: further progression of gait with existing device    Recommendation for discharge: (in order for the patient to meet his/her long term goals):   Moderate intensity short-term skilled physical therapy up to 5x/week    Other factors to consider for discharge: patient's current support system is unable to meet their requirements for physical assistance and concern for safely navigating or managing the home environment    IF patient discharges home will need the following DME: continuing to assess with progress       SUBJECTIVE:   Patient stated, \"I'll do whatever you think I should.\"    OBJECTIVE DATA SUMMARY:   Critical Behavior:  Orientation  Overall Orientation Status: Within Functional Limits  Orientation Level: Oriented X4    HR 64 at rest, HR 70 with ambulation x 2 trials, asymptomatic, SpO2 96% room air    Functional Mobility Training:  Bed Mobility:  Bed Mobility Training  Bed Mobility Training: Yes  Interventions: Safety awareness training;Verbal cues  Supine to Sit: Minimum assistance;Assist X1  Scooting: Additional time;Assist X1;Minimum assistance  Transfers:  Transfer Training  Transfer Training: Yes  Sit to Stand: Minimum assistance;Assist X1  Stand to Sit: Contact-guard assistance;Assist X1;Additional time;Adaptive equipment  Bed to Chair: Contact-guard assistance;Assist X1  Toilet Transfer: Contact-guard assistance;Assist X1  Sit<->stand x 2 from bed minimal assist   Sit<->stand x 2 from chair contact guard assist

## 2024-08-29 NOTE — PROGRESS NOTES
ATTENDING CARDIOLOGIST  The patient was personally examined and chart reviewed. All the elements of history and examination were personally performed and I agree with the plan as listed by advanced practice provider.    Treatment plan was addressed with the patient.      Subjective:  AF  Recurrent  PO metoprolol added    Blood pressure (!) 96/59, pulse 58, temperature 98.1 °F (36.7 °C), temperature source Oral, resp. rate 15, height 1.702 m (5' 7\"), weight 72.6 kg (160 lb), SpO2 97%.  Normal rate, regular rhythm, S1/S2  Lungs clear      A/P:    Paroxysmal atrial fibrillation with RVR  - in/out of afib, asymptomatic   - start PO metoprolol  - Continue Flecainide 50 mg BID  - Continue diltiazem 360 mg daily  - Restart Eliquis once cleared from surgery standpoint  - She had stress test at [facility] 2 months ago which reportedly showed no ischemia.  - Echo 24 shows EF 55%, abnormal diastolic function. Mild TR, trace MR.      2. Left femoral neck fracture secondary to mechanical fall  -s/p left hip hemiarthroplasty    3. Hypertension  - BP softer, d/c norvasc and hold lisinopril to allow for further rate control    4. Pre diabetes  - A1c 6.4     Follow up with S on discharge, likely AF episodes all driven by surgery      []    High complexity decision making was performed  []    Patient is at high-risk of decompensation with multiple organ involvement        Lyric Lyman MS, MD, FACC        Lyric Lyman MS, MD, FACC  [facility]  [phone] (P)  [phone] (F)                [facility] CARDIOLOGY  Cardiology Note     []Initial Encounter     [x]Follow-up    Patient Name: Kenyatta Santana - :1945 - MRN:[id number]  Primary Cardiologist: WILLIAM ([facility]-EP)  Rounding cardiologist:  Dr. lyman     Reason for consult:  Preop evaluation.  24: Reconsulted for afib with RVR    Interval HPI:  This a.m.  pt underwent left bipolar hip hemiarthroplasty.  This afternoon, pt went into  input(s): \"CPK\" in the last 72 hours.    Invalid input(s): \"CKQMB\", \"CPKMB\", \"TROIQ\", \"BMPP\"  Recent Labs     08/26/24  1551 08/27/24  0328    142   K 4.5 4.0   * 112*   CO2 24 25   BUN 13 22*     Recent Labs     08/27/24  0328   HGB 12.1   HCT 36.1     No results for input(s): \"INR\" in the last 72 hours.    Invalid input(s): \"PTTP\", \"PTP\", \"GPT\", \"SGOT\", \"AP\"  No results for input(s): \"CHOL\", \"HDLC\", \"HBA1C\" in the last 72 hours.    Invalid input(s): \"TGL\", \"LDLC\"  Recent Labs     08/27/24  0328   TSH 0.26*           Current meds:    Current Facility-Administered Medications:     metoprolol tartrate (LOPRESSOR) tablet 25 mg, 25 mg, Oral, BID, Lisandra Henry APRN - NP, 25 mg at 08/29/24 0846    metoprolol (LOPRESSOR) injection 2.5 mg, 2.5 mg, IntraVENous, Q4H PRN, Amalia Walden APRN - NP, 2.5 mg at 08/29/24 0421    insulin lispro (HUMALOG,ADMELOG) injection vial 0-4 Units, 0-4 Units, SubCUTAneous, TID , Luis M Everett APRN - CNS    insulin lispro (HUMALOG,ADMELOG) injection vial 0-4 Units, 0-4 Units, SubCUTAneous, Nightly, Luis M Everett APRN - CNS    sodium chloride 0.9 % bolus 500 mL, 500 mL, IntraVENous, Once, Nathan Sapp PA-C    sodium chloride flush 0.9 % injection 5-40 mL, 5-40 mL, IntraVENous, 2 times per day, Nathan Sapp PA-C, 10 mL at 08/29/24 0848    sodium chloride flush 0.9 % injection 5-40 mL, 5-40 mL, IntraVENous, PRN, Nathan Sapp PA-C    0.9 % sodium chloride infusion, , IntraVENous, PRN, Nathan Sapp PA-C    acetaminophen (TYLENOL) tablet 650 mg, 650 mg, Oral, Q6H, Nathan Sapp PA-C, 650 mg at 08/29/24 0421    oxyCODONE (ROXICODONE) immediate release tablet 5 mg, 5 mg, Oral, Q3H PRN, 5 mg at 08/28/24 0750 **OR** oxyCODONE (ROXICODONE) immediate release tablet 10 mg, 10 mg, Oral, Q3H PRN, Nathan Sapp PA-C    HYDROmorphone (DILAUDID) injection 0.5 mg, 0.5 mg, IntraVENous, Q3H PRN **OR** HYDROmorphone HCl PF (DILAUDID) injection 1

## 2024-08-29 NOTE — CARE COORDINATION
1:21 PM  08/29/24    Care Management Progress Note    Reason for Admission:   Closed fracture of neck of left femur, initial encounter ([facility]) [S72.002A]  Closed left hip fracture, initial encounter ([facility]) [S72.002A]  Displaced fracture of left femoral neck (HCC) [S72.002A]  Procedure(s) (LRB):  LEFT BIPOLAR HIP HEMIARTHROPLASTY (Left)  3 Days Post-Op    Patient Admission Status: Inpatient  RUR: Readmission Risk Score: 9.3    Hospitalization in the last 30 days (Readmission):  No        Transition of care plan:  Ongoing medical management- pt discussed during IDR; ortho and cardiology following.  Anticipated discharge plan: short term SNF rehab at [facility]. Pt had RRT called early this a.m.; pt will be monitored another day for heart rate and BP; discharge tentatively planned for tomorrow. CM updated the SNF and they will be able to accept pt tomorrow.  Outpatient follow-up.  Pt's family to transport.    CM updated pt's daughter via TC; CM will continue to follow and assist with discharge needs.    SEGUNDO Rai

## 2024-08-30 PROBLEM — I48.91 ATRIAL FIBRILLATION WITH RAPID VENTRICULAR RESPONSE (HCC): Status: ACTIVE | Noted: 2024-08-30

## 2024-08-30 LAB
ANION GAP SERPL CALC-SCNC: 3 MMOL/L (ref 5–15)
BASOPHILS # BLD: 0.1 K/UL (ref 0–0.1)
BASOPHILS NFR BLD: 1 % (ref 0–1)
BUN SERPL-MCNC: 15 MG/DL (ref 6–20)
BUN/CREAT SERPL: 23 (ref 12–20)
CALCIUM SERPL-MCNC: 9.6 MG/DL (ref 8.5–10.1)
CHLORIDE SERPL-SCNC: 110 MMOL/L (ref 97–108)
CK SERPL-CCNC: 399 U/L (ref 26–192)
CO2 SERPL-SCNC: 26 MMOL/L (ref 21–32)
CREAT SERPL-MCNC: 0.64 MG/DL (ref 0.55–1.02)
DIFFERENTIAL METHOD BLD: ABNORMAL
EKG ATRIAL RATE: 286 BPM
EKG DIAGNOSIS: NORMAL
EKG Q-T INTERVAL: 184 MS
EKG QRS DURATION: 76 MS
EKG QTC CALCULATION (BAZETT): 271 MS
EKG R AXIS: 1 DEGREES
EKG T AXIS: 168 DEGREES
EKG VENTRICULAR RATE: 131 BPM
EOSINOPHIL # BLD: 0.2 K/UL (ref 0–0.4)
EOSINOPHIL NFR BLD: 2 % (ref 0–7)
ERYTHROCYTE [DISTWIDTH] IN BLOOD BY AUTOMATED COUNT: 13.2 % (ref 11.5–14.5)
GLUCOSE SERPL-MCNC: 134 MG/DL (ref 65–100)
HCT VFR BLD AUTO: 37.1 % (ref 35–47)
HGB BLD-MCNC: 11.8 G/DL (ref 11.5–16)
IMM GRANULOCYTES # BLD AUTO: 0.1 K/UL (ref 0–0.04)
IMM GRANULOCYTES NFR BLD AUTO: 1 % (ref 0–0.5)
LYMPHOCYTES # BLD: 0.6 K/UL (ref 0.8–3.5)
LYMPHOCYTES NFR BLD: 6 % (ref 12–49)
MAGNESIUM SERPL-MCNC: 2.4 MG/DL (ref 1.6–2.4)
MCH RBC QN AUTO: 30.3 PG (ref 26–34)
MCHC RBC AUTO-ENTMCNC: 31.8 G/DL (ref 30–36.5)
MCV RBC AUTO: 95.1 FL (ref 80–99)
MONOCYTES # BLD: 1 K/UL (ref 0–1)
MONOCYTES NFR BLD: 10 % (ref 5–13)
NEUTS SEG # BLD: 8.3 K/UL (ref 1.8–8)
NEUTS SEG NFR BLD: 80 % (ref 32–75)
NRBC # BLD: 0 K/UL (ref 0–0.01)
NRBC BLD-RTO: 0 PER 100 WBC
PLATELET # BLD AUTO: 226 K/UL (ref 150–400)
PMV BLD AUTO: 12.1 FL (ref 8.9–12.9)
POTASSIUM SERPL-SCNC: 4.1 MMOL/L (ref 3.5–5.1)
RBC # BLD AUTO: 3.9 M/UL (ref 3.8–5.2)
RBC MORPH BLD: ABNORMAL
SODIUM SERPL-SCNC: 139 MMOL/L (ref 136–145)
T3FREE SERPL-MCNC: 2.3 PG/ML (ref 2.2–4)
T4 FREE SERPL-MCNC: 1.5 NG/DL (ref 0.8–1.5)
WBC # BLD AUTO: 10.3 K/UL (ref 3.6–11)

## 2024-08-30 PROCEDURE — 2580000003 HC RX 258: Performed by: PHYSICIAN ASSISTANT

## 2024-08-30 PROCEDURE — 6360000002 HC RX W HCPCS: Performed by: INTERNAL MEDICINE

## 2024-08-30 PROCEDURE — 6370000000 HC RX 637 (ALT 250 FOR IP): Performed by: HOSPITALIST

## 2024-08-30 PROCEDURE — 99233 SBSQ HOSP IP/OBS HIGH 50: CPT | Performed by: INTERNAL MEDICINE

## 2024-08-30 PROCEDURE — 84439 ASSAY OF FREE THYROXINE: CPT

## 2024-08-30 PROCEDURE — 83735 ASSAY OF MAGNESIUM: CPT

## 2024-08-30 PROCEDURE — 6370000000 HC RX 637 (ALT 250 FOR IP): Performed by: PHYSICIAN ASSISTANT

## 2024-08-30 PROCEDURE — 36415 COLL VENOUS BLD VENIPUNCTURE: CPT

## 2024-08-30 PROCEDURE — 99232 SBSQ HOSP IP/OBS MODERATE 35: CPT | Performed by: INTERNAL MEDICINE

## 2024-08-30 PROCEDURE — 85025 COMPLETE CBC W/AUTO DIFF WBC: CPT

## 2024-08-30 PROCEDURE — 94761 N-INVAS EAR/PLS OXIMETRY MLT: CPT

## 2024-08-30 PROCEDURE — 97116 GAIT TRAINING THERAPY: CPT

## 2024-08-30 PROCEDURE — 97110 THERAPEUTIC EXERCISES: CPT

## 2024-08-30 PROCEDURE — 80048 BASIC METABOLIC PNL TOTAL CA: CPT

## 2024-08-30 PROCEDURE — APPSS30 APP SPLIT SHARED TIME 16-30 MINUTES: Performed by: NURSE PRACTITIONER

## 2024-08-30 PROCEDURE — 82550 ASSAY OF CK (CPK): CPT

## 2024-08-30 PROCEDURE — 2060000000 HC ICU INTERMEDIATE R&B

## 2024-08-30 PROCEDURE — 6360000002 HC RX W HCPCS: Performed by: PHYSICIAN ASSISTANT

## 2024-08-30 PROCEDURE — 6370000000 HC RX 637 (ALT 250 FOR IP): Performed by: NURSE PRACTITIONER

## 2024-08-30 PROCEDURE — 6370000000 HC RX 637 (ALT 250 FOR IP): Performed by: INTERNAL MEDICINE

## 2024-08-30 PROCEDURE — 84481 FREE ASSAY (FT-3): CPT

## 2024-08-30 RX ORDER — DIGOXIN 0.25 MG/ML
500 INJECTION INTRAMUSCULAR; INTRAVENOUS ONCE
Status: COMPLETED | OUTPATIENT
Start: 2024-08-30 | End: 2024-08-30

## 2024-08-30 RX ORDER — DIGOXIN 125 MCG
125 TABLET ORAL DAILY
Status: DISCONTINUED | OUTPATIENT
Start: 2024-08-31 | End: 2024-09-03

## 2024-08-30 RX ORDER — DIGOXIN 0.25 MG/ML
250 INJECTION INTRAMUSCULAR; INTRAVENOUS ONCE
Status: COMPLETED | OUTPATIENT
Start: 2024-08-30 | End: 2024-08-30

## 2024-08-30 RX ORDER — METOPROLOL TARTRATE 25 MG/1
12.5 TABLET, FILM COATED ORAL 2 TIMES DAILY
Status: DISCONTINUED | OUTPATIENT
Start: 2024-08-30 | End: 2024-09-02

## 2024-08-30 RX ADMIN — SODIUM CHLORIDE, PRESERVATIVE FREE 10 ML: 5 INJECTION INTRAVENOUS at 21:38

## 2024-08-30 RX ADMIN — SODIUM CHLORIDE, PRESERVATIVE FREE 10 ML: 5 INJECTION INTRAVENOUS at 10:18

## 2024-08-30 RX ADMIN — DIGOXIN 500 MCG: 0.25 INJECTION INTRAMUSCULAR; INTRAVENOUS at 14:03

## 2024-08-30 RX ADMIN — METOPROLOL TARTRATE 12.5 MG: 25 TABLET, FILM COATED ORAL at 21:34

## 2024-08-30 RX ADMIN — APIXABAN 5 MG: 5 TABLET, FILM COATED ORAL at 14:03

## 2024-08-30 RX ADMIN — ACETAMINOPHEN 650 MG: 325 TABLET ORAL at 17:13

## 2024-08-30 RX ADMIN — ACETAMINOPHEN 650 MG: 325 TABLET ORAL at 22:13

## 2024-08-30 RX ADMIN — ACETAMINOPHEN 650 MG: 325 TABLET ORAL at 10:15

## 2024-08-30 RX ADMIN — FLECAINIDE ACETATE 50 MG: 100 TABLET ORAL at 10:15

## 2024-08-30 RX ADMIN — FAMOTIDINE 20 MG: 20 TABLET, FILM COATED ORAL at 10:16

## 2024-08-30 RX ADMIN — DILTIAZEM HYDROCHLORIDE 360 MG: 180 CAPSULE, COATED, EXTENDED RELEASE ORAL at 10:16

## 2024-08-30 RX ADMIN — METOPROLOL TARTRATE 12.5 MG: 25 TABLET, FILM COATED ORAL at 10:16

## 2024-08-30 RX ADMIN — ENOXAPARIN SODIUM 40 MG: 100 INJECTION SUBCUTANEOUS at 10:15

## 2024-08-30 RX ADMIN — APIXABAN 5 MG: 5 TABLET, FILM COATED ORAL at 21:34

## 2024-08-30 RX ADMIN — ACETAMINOPHEN 650 MG: 325 TABLET ORAL at 06:07

## 2024-08-30 RX ADMIN — SODIUM CHLORIDE, PRESERVATIVE FREE 10 ML: 5 INJECTION INTRAVENOUS at 10:17

## 2024-08-30 RX ADMIN — DIGOXIN 250 MCG: 0.25 INJECTION INTRAMUSCULAR; INTRAVENOUS at 21:35

## 2024-08-30 ASSESSMENT — PAIN SCALES - GENERAL
PAINLEVEL_OUTOF10: 0
PAINLEVEL_OUTOF10: 0
PAINLEVEL_OUTOF10: 2

## 2024-08-30 NOTE — PROGRESS NOTES
ATTENDING CARDIOLOGIST  The patient was personally examined and chart reviewed. All the elements of history and examination were personally performed and I agree with the plan as listed by advanced practice provider.    Treatment plan was addressed with the patient.      Subjective:  AFIB with RVR, again    Blood pressure 115/76, pulse 100, temperature 97.9 °F (36.6 °C), temperature source Oral, resp. rate 15, height 1.702 m (5' 7\"), weight 72.6 kg (160 lb), SpO2 97%.  Irregular rate, regular rhythm, S1/S2  Lungs clear      A/P:  A-fib with RVR, managed by Dr. Modesto Carreno with [facility].  Status post cardioversion in the past.  Home regimen of flecainide and diltiazem.  Post hip fracture.  A-fib now refractory to calcium channel blocker flecainide metoprolol.  I have considered loading her with IV amiodarone, would have to discontinue flecainide.    We have asked EP to weigh in given the tachybrady nature of her PAF.        []    High complexity decision making was performed  []    Patient is at high-risk of decompensation with multiple organ involvement        Lyric Lyman MS, MD, FACC        Lyric Lmyan MS, MD, FACC  [facility]  [phone] (P)  [phone] (F)              [facility] CARDIOLOGY  Cardiology Note     []Initial Encounter     [x]Follow-up    Patient Name: Kenyatta Santana - :1945 - MRN:[id number]  Primary Cardiologist: WILLIAM ([facility]-EP)  Rounding cardiologist:  Dr. lyman     Reason for consult:  Preop evaluation.  24: Reconsulted for afib with RVR    Interval HPI:  This a.m.  pt underwent left bipolar hip hemiarthroplasty.  This afternoon, pt went into afib with RVR with HR as high as 180's-200 per documentation. EKG showed afib with RVR. RRT was called. She was given a total of 3 doses of IV lopressor and she was transferred to [facility]. Upon arrival to [facility], she was noted to be in NSR with HR in the 70's.  Pt reports during RVR episode, she had no chest pain,  pericardial effusion present. No indication of cardiac tamponade.    Image quality is poor. Procedure performed with the patient in a supine position.    Signed by: Sung Adler MD on 8/26/2024  8:55 AM    Most recent HS troponins:  No results for input(s): \"TROPHS\", \"CKMB\" in the last 72 hours.    ECG: sinus rhythm, rate of 97 bpm with PACs, no ischemic changes    Review of Systems    [x]All other systems reviewed and all negative except as written in HPI    [] Patient unable to provide secondary to condition         Past Medical History:   Diagnosis Date    Atrial fibrillation (HCC)     [facility] and [facility]    Hypertension     Non-smoker      Past Surgical History:   Procedure Laterality Date    ELBOW SURGERY Left 2004    HIP SURGERY Left 8/26/2024    LEFT BIPOLAR HIP HEMIARTHROPLASTY performed by Primitivo Pena MD at [facility] AM[facility]UL[facility]ORY [facility]     Social Hx:  reports that she has never smoked. She has never used smokeless tobacco. She reports that she does not drink alcohol and does not use drugs.  Family Hx: family history is not on file.  No Known Allergies       OBJECTIVE:  Wt Readings from Last 3 Encounters:   08/24/24 72.6 kg (160 lb)       Intake/Output Summary (Last 24 hours) at 8/30/2024 1107  Last data filed at 8/30/2024 0353  Gross per 24 hour   Intake --   Output 800 ml   Net -800 ml         Physical Exam    Vitals:   Vitals:    08/29/24 2343 08/30/24 0353 08/30/24 0800 08/30/24 1015   BP: 127/68 124/60 118/80 118/80   Pulse:    (!) 120   Resp:       Temp: 98.4 °F (36.9 °C) 98.6 °F (37 °C) 98.4 °F (36.9 °C)    TempSrc: Oral Oral Oral    SpO2:       Weight:       Height:           PHYSICAL EXAM  General:  Alert and oriented, in no acute distress  Head:  Atraumatic, normocephalic  Neck:  Supple,  Lungs:  Clear to auscultation bilaterally, no wheezes/rales/rhonchi   Cardiovascular:  Irregular rate and rhythm, normal S1-S2, no murmurs/rubs/gallops  Abdomen:  Soft, nontender, nondistended, normoactive

## 2024-08-30 NOTE — PLAN OF CARE
Problem: Physical Therapy - Adult  Goal: By Discharge: Performs mobility at highest level of function for planned discharge setting.  See evaluation for individualized goals.  Description: FUNCTIONAL STATUS PRIOR TO ADMISSION: Patient was independent without DME.    HOME SUPPORT PRIOR TO ADMISSION: The patient lived alone with daughter close by to provide assistance if needed.    Physical Therapy Goals  Initiated 8/27/2024  1.  Patient will move from supine to sit and sit to supine, scoot up and down, and roll side to side in bed with modified independence within 4 day(s).    2.  Patient will perform sit to stand with modified independence within 4 day(s).  3.  Patient will transfer from bed to chair and chair to bed with modified independence using the least restrictive device within 4 day(s).  4.  Patient will ambulate with modified independence for 150 feet with the least restrictive device within 4 day(s).   5.  Patient will perform LE home exercise program per protocol with modified independence within 4 days.      8/30/2024 1535 by Ju Qureshi PT  Outcome: Progressing     Problem: Pain  Goal: Verbalizes/displays adequate comfort level or baseline comfort level  Recent Flowsheet Documentation  Taken 8/30/2024 0800 by Jesus Salomon RN  Verbalizes/displays adequate comfort level or baseline comfort level:   Encourage patient to monitor pain and request assistance   Assess pain using appropriate pain scale   Administer analgesics based on type and severity of pain and evaluate response     Problem: Neurosensory - Adult  Goal: Achieves stable or improved neurological status  Recent Flowsheet Documentation  Taken 8/30/2024 0800 by Jesus Salomon RN  Achieves stable or improved neurological status:   Assess for and report changes in neurological status   Initiate measures to prevent increased intracranial pressure   Maintain blood pressure and fluid volume within ordered parameters to optimize cerebral perfusion  facility with appropriate resources:   Identify barriers to discharge with patient and caregiver   Arrange for needed discharge resources and transportation as appropriate   Identify discharge learning needs (meds, wound care, etc)   Pt agreed to work on care plan.

## 2024-08-30 NOTE — CARE COORDINATION
5:14 PM  08/30/24    Care Management Progress Note    Reason for Admission:   Closed fracture of neck of left femur, initial encounter ([facility]) [S72.002A]  Closed left hip fracture, initial encounter ([facility]) [S72.002A]  Displaced fracture of left femoral neck ([facility]) [S72.002A]  Procedure(s) (LRB):  LEFT BIPOLAR HIP HEMIARTHROPLASTY (Left)  4 Days Post-Op    Patient Admission Status: Inpatient  RUR: Readmission Risk Score: 9.1    Hospitalization in the last 30 days (Readmission):  No        Transition of care plan:  Ongoing medical management- pt discussed during IDR; cardiology following.  Anticipated discharge plan is short term SNF at [facility]; waiting on pt's medical clearance. SNF will need updated Covid test- must be done within 24 hours of discharge.  Outpatient follow-up.  Pt's family to transport.      CM updated [place] that pt remains hospitalized; will keep SNF updated on [facility]NA[facility].    CM will continue to follow.    SEGUNDO Rai

## 2024-08-30 NOTE — PROGRESS NOTES
[facility] CARDIOLOGY  Cardiology Note     []Initial Encounter     [x]Follow-up    Patient Name: Kenyatta Santana - :1945 - MRN:[id number]  Primary Cardiologist: WILLIAM ([facility]-EP)  [facility] cardiologist:  Dr. Kennedy  Seen by Dr Chambers on admission      Reason for consult:  Preop evaluation: consulted for afib with RVR    : s/p    LEFT BIPOLAR HIP HEMIARTHROPLASTY    She has afib with RVR and she was on flecainide 50 mg bid with cardizem  mg  Ventricular rate 130 bpm    She is asymptomatic  Daughter said she cannot go to [facility] as rate is fast      SUBJECTIVE:  Denies of any chest pain, shortness of breath, palpitations.      Assessment and Plan       Atrial fibrillation with RVR  Asymptomatic   Hospitalist added low dose metoprolol 12.5 mg bid  Stop low dose Flecainide 50 mg BID  - Continue diltiazem 360 mg daily  - on Eliquis   - She had stress test at [facility] 2 months ago which reportedly showed no ischemia.  - Echo 24 shows EF 55%, abnormal diastolic function. Mild TR, trace MR.    - I gave digoxin IV to control ventricular rate.  BP does not tolerate more beta blocker or higher cardizem  Monitor for bradycardia  Tomorrow she will get cardizem with PO digoxin  She will follow up outpatient with [facility] cardiologist and EP after rehab  She may go to rehab if ventricular rate  bpm    2. Left femoral neck fracture secondary to mechanical fall  -s/p left hip hemiarthroplasty  + pain, partly drive up rate    3.Hypertension  BP does not tolerate more med so d/c norvasc and hold lisinopril while using cardizem PO    4. Pre diabetes  - A1c 6.4   - Management per hospitalist team    Follow up with [facility] on discharge     Clay Barajas M.D.   Electrophysiology/Cardiology   [facility] [place]   [address]                      [address] [address]   [place] [address]                             [facility] [address] [phone]    sennosides-docusate sodium (SENOKOT-S) 8.6-50 MG tablet 1 tablet, 1 tablet, Oral, Daily, Ashlyn Amaral MD, 1 tablet at 08/29/24 0845    dilTIAZem (CARDIZEM CD) extended release capsule 360 mg, 360 mg, Oral, Daily, Aaron Hicks MD, 360 mg at 08/30/24 1016    hydrALAZINE (APRESOLINE) injection 10 mg, 10 mg, IntraVENous, Q6H PRN, Lu Sanchez, APRN - NP    sodium chloride flush 0.9 % injection 5-40 mL, 5-40 mL, IntraVENous, 2 times per day, Clay Chappell PA, 10 mL at 08/30/24 1018    sodium chloride flush 0.9 % injection 5-40 mL, 5-40 mL, IntraVENous, PRN, Clay Chappell PA    0.9 % sodium chloride infusion, , IntraVENous, PRN, Clay Chappell PA    polyethylene glycol (GLYCOLAX) packet 17 g, 17 g, Oral, Daily PRN, Clay Chappell PA Matthew Ngo, MD    CC:No primary care provider on file.

## 2024-08-30 NOTE — PROGRESS NOTES
Nutrition Assessment     Type and Reason for Visit: Initial, RD Nutrition Re-Screen/LOS    Nutrition Recommendations/Plan:   Continue 4 Carb Choice diet     Obtain measured weight - Standing weight preferred if patient is safe and able. If using bed scale, be sure bed has been calibrated.       Malnutrition Assessment:  Malnutrition Status: No malnutrition    Nutrition Assessment:    78 year old female admitted for Closed fracture of neck of left femur, initial encounter ([facility]) [S72.002A]  Closed left hip fracture, initial encounter ([facility]) [S72.002A]  Displaced fracture of left femoral neck ([facility]) [S72.002A] who  has a past medical history of Atrial fibrillation ([facility]), Hypertension, and Non-smoker.  Pt is seen today for LOS. She has plans for rehab at discharge but awaiting cardiac clearance. Pt reports her appetite is good now. She mentions she had some trouble when her  passed away, but she is doing better now. Her daughter is at bedside and confirms good appetite and stable weight. Admit weight is stated. RD completed nutrition focused physical exam without significant findings beyond normal age-related conditions at this time. BG has been mildly elevated and is age appropriate. Patient would like to continue diet controlled measures in place. She declines offer of protein supplements since she is eating well at this time. Pt is having some loose stools today as a result of bowel regimen, which has been since been changed. Usually normal daily bowel habits. No other GI concerns at this time.      Estimated Daily Nutrient Needs:  Energy (kcal):  1815 - 2033 kcal/d (25-28 kcal/kg) Weight Used for Energy Requirements: Current     Protein (g):  58 - 73 (0.8-1 g/kg) Weight Used for Protein Requirements: Current        Fluid (ml/day):  1815 - 2033 Method Used for Fluid Requirements: 1 ml/kcal    Nutrition Related Findings:     Wound Type: Surgical Incision (left hip)    Last BM: 08/30/24  Edema: None (8/30/24)

## 2024-08-30 NOTE — PLAN OF CARE
rolling walker for room distances only as activity limited by tachycardia with increased activity requiring seated rest breaks between trials. Patient able to progress to standing lower extremity strengthening exercises at rolling walker with contact guard assist. She will benefit from continued skilled PT interventions to maximize functional gains and achieve goals.          PLAN:  Patient continues to benefit from skilled intervention to address the above impairments.  Continue treatment per established plan of care.    Recommend with staff: therapy recommendations for staff: Recommend mobility with staff assist x1 using gait belt and rolling walker.    Recommend for next PT session: monitor HR, sit to stand transfers, and further progression of gait with existing device    Recommendation for discharge: (in order for the patient to meet his/her long term goals):   Moderate intensity short-term skilled physical therapy up to 5x/week    Other factors to consider for discharge: concern for safely navigating or managing the home environment    IF patient discharges home will need the following DME: continuing to assess with progress       SUBJECTIVE:   Patient stated, \"I'm used to walking for at least 30 minutes every day.\"    OBJECTIVE DATA SUMMARY:   Critical Behavior:  Orientation  Orientation Level: Oriented X4     Per telemetry:  HR 85 at rest on toilet, HR up to 100 ambulating out of bathroom  With initial ambulation trial ->120-> 74 upon seated rest  With 2nd ambulation trial ->125 -> 76 upon seated rest  With standing lower extremity exercises HR   Patient asymptomatic     Functional Mobility Training:    Transfers:  Transfer Training  Transfer Training: Yes  Interventions: Safety awareness training;Verbal cues  Sit to Stand: Contact-guard assistance  Stand to Sit: Contact-guard assistance  Toilet Transfer: Contact-guard assistance  Balance:  Balance  Sitting: Intact  Standing - Static:  Constant support;Good  Standing - Dynamic: Constant support;Good   Ambulation/Gait Training:     Gait  Gait Training: Yes  Left Side Weight Bearing: As tolerated  Overall Level of Assistance: Contact-guard assistance;Assist X1;Additional time  Distance (ft):  (8ft x 1, 12ft x 1, 20ft x 1)  Assistive Device: Walker, rolling  Interventions: Safety awareness training;Verbal cues  Speed/Susan: Pace decreased (< 100 feet/min)  Step Length: Right shortened;Left shortened  Gait Abnormalities: Antalgic;Decreased step clearance                    Therapeutic Exercises: standing at rolling walker with contact guard assist bilateral LE    EXERCISE   Sets   Reps   Active Active Assist   Passive Self ROM   Comments   Heel raises 1 10 [x] [] [] []    Marching 1 10 [x] [] [] []    Hip abduction 1 10 [x] [] [] []    Hip Extension 1 10 [x] [] [] []            Pain Ratin/10   Pain Intervention(s):   rest and pain is at a level acceptable to the patient    Activity Tolerance:   Good    After treatment:   Patient left in no apparent distress sitting up in chair, Call bell within reach, Bed/ chair alarm activated, Caregiver / family present, and Updated patient's board on functional status and mobility recommendations      COMMUNICATION/EDUCATION:   The patient's plan of care was discussed with: registered nurse    Patient Education  Education Given To: Patient  Education Provided: Role of Therapy;Plan of Care;Home Exercise Program;Transfer Training;Fall Prevention Strategies  Education Method: Verbal;Demonstration;Teach Back  Barriers to Learning: None  Education Outcome: Continued education needed;Verbalized understanding;Demonstrated understanding      Ju Qureshi PT  Minutes: 25

## 2024-08-30 NOTE — PROGRESS NOTES
I spoke with the patient. I spoke with his caregiver I spoke with the patient again. Apparently his blood sugar was about 295 recently. The patient seems a little bit wound up. It is not time for his insulin yet. He agrees to let them check his blood sugar again when it's closer to dinnertime and to give him insulin as needed.   Occupational Therapy Contact Note  08/30/24    Patient tachy to 140 seated in chair at this time with Cards at bedside, per cards pt to have EP consult placed, will hold at this time and follow up pending plan and HR control.    Thank you,  Delores Vann, OTD, OTR/L

## 2024-08-30 NOTE — DIABETES MGMT
Diabetes Management Team to sign off at this point as patient's blood glucose remains stable.  Her BG range has been 130-180 last 2 days without any need for insulin. This BG range is acceptable for her age. Patient and I discussed pros and cons of starting a low dose Metformin with pre-diabetes status (A1c 6.4%). Patient would rather not start new medication and would prefer to just watch diet and follow up with new PCP. Will discontinue BG checks and correctional insulin. Please re-consult us if patient needs change.  Thank you for including us in their care.            Lui sM Everett, MSN, APRN-CNS  [facility]

## 2024-08-30 NOTE — PROGRESS NOTES
Physical Therapy Note:    Patient with tachycardia at this time at rest (HR up to 137bpm per telemetry), cardiology seeing patient at this time and recommending EP consult. Will defer PT interventions at this time and continue to follow.    Thank you,  Ju Qureshi, PT, DPT

## 2024-08-30 NOTE — PROGRESS NOTES
[facility]  [address] [place] [address] [phone]        Hospitalist Progress Note      NAME: Kenyatta Santana   :  1945  MRM:  [id number]    Date/Time: 2024  4:36 PM         Subjective:     Chief Complaint: \"I'm okay\"     Pt seen and examined. Pain in hip much better after repair. HR remains elevated. Plans for dig as per EP today     ROS:  (bold if positive, if negative)            Objective:       Vitals:          Last 24hrs VS reviewed since prior progress note. Most recent are:    Vitals:    24 1403   BP: 118/66   Pulse: 98   Resp:    Temp:    SpO2:      SpO2 Readings from Last 6 Encounters:   24 97%          Intake/Output Summary (Last 24 hours) at 2024 1636  Last data filed at 2024 0800  Gross per 24 hour   Intake 260 ml   Output 800 ml   Net -540 ml          Exam:     Physical Exam:    Gen:  Well-developed, well-nourished, in no acute distress  HEENT:  Pink conjunctivae, PERRL, hearing intact to voice, moist mucous membranes  Neck:  Supple, without masses, thyroid non-tender  Resp:  No accessory muscle use, clear breath sounds without wheezes rales or rhonchi  Card: irregularly irregular   Abd:  Soft, non-tender, non-distended, normoactive bowel sounds are present  Musc:  No cyanosis or clubbing  Skin:  No rashes or ulcers, skin turgor is good  Neuro:  Cranial nerves 3-12 are grossly intact,  strength is 5/5 bilaterally and dorsi / plantarflexion is 5/5 bilaterally, follows commands appropriately  Psych:  Good insight, oriented to person, place and time, alert  L hip incision C/D/I     Medications Reviewed: (see below)    Lab Data Reviewed: (see below)    ______________________________________________________________________    Medications:     Current Facility-Administered Medications   Medication Dose Route Frequency    metoprolol tartrate (LOPRESSOR) tablet 12.5 mg  12.5 mg Oral BID    apixaban (ELIQUIS) tablet 5 mg  5  hydrALAZINE (APRESOLINE) injection 10 mg  10 mg IntraVENous Q6H PRN    sodium chloride flush 0.9 % injection 5-40 mL  5-40 mL IntraVENous 2 times per day    sodium chloride flush 0.9 % injection 5-40 mL  5-40 mL IntraVENous PRN    0.9 % sodium chloride infusion   IntraVENous PRN    polyethylene glycol (GLYCOLAX) packet 17 g  17 g Oral Daily PRN            Lab Review:     Recent Labs     08/30/24  0836   WBC 10.3   HGB 11.8   HCT 37.1        Recent Labs     08/30/24  0836      K 4.1   *   CO2 26   BUN 15   MG 2.4     No components found for: \"GLPOC\"         Assessment / Plan:   Paroxysmal atrial fibrillation with RVR  Secondary hypercoagulable state due to A-fib  Long-term anticoagulation with Eliquis  -HR remains uncontrolled despite dilt, metoprolol, flecanide; BP preventing further uptitration of metoprolol   -dig load as per EP; start PO in the AM   -stop flecanide  -continue dilt, metoprolol   -resume eliquis     Left femoral neck fracture due to mechanical fall, POA  -continue tylenol   -PRN pain meds   -PT/OT   -SNF     Leukocytosis, likely reactive d/t fracture, POA. Resolved  -monitor     HTN - holding ACE I and amlodipine to allow for dilt and metoprolol   -monitor on dilt, metoprolol    PreDM - A1c 6.4  -monitor BG on BMP   -diabetic diet     CKD - ruled out   -monitor     Overweight (25.0 - 29.9): Body mass index is 25.06 kg/m².:    -weight loss     Total time spent with patient: 35 Minutes                  Care Plan discussed with: Patient, Family, and Nursing Staff    Discussed:  Care Plan    Prophylaxis:   eliquis    Disposition:  SNF/LTC           ___________________________________________________    Attending Physician: Nguyen Laboy MD

## 2024-08-31 PROCEDURE — 6370000000 HC RX 637 (ALT 250 FOR IP): Performed by: HOSPITALIST

## 2024-08-31 PROCEDURE — 99232 SBSQ HOSP IP/OBS MODERATE 35: CPT | Performed by: SPECIALIST

## 2024-08-31 PROCEDURE — 2060000000 HC ICU INTERMEDIATE R&B

## 2024-08-31 PROCEDURE — 6370000000 HC RX 637 (ALT 250 FOR IP): Performed by: NURSE PRACTITIONER

## 2024-08-31 PROCEDURE — 2500000003 HC RX 250 WO HCPCS: Performed by: PHYSICIAN ASSISTANT

## 2024-08-31 PROCEDURE — 6370000000 HC RX 637 (ALT 250 FOR IP): Performed by: INTERNAL MEDICINE

## 2024-08-31 PROCEDURE — 6370000000 HC RX 637 (ALT 250 FOR IP): Performed by: PHYSICIAN ASSISTANT

## 2024-08-31 PROCEDURE — 2580000003 HC RX 258: Performed by: PHYSICIAN ASSISTANT

## 2024-08-31 PROCEDURE — 94761 N-INVAS EAR/PLS OXIMETRY MLT: CPT

## 2024-08-31 PROCEDURE — 97116 GAIT TRAINING THERAPY: CPT

## 2024-08-31 PROCEDURE — 97530 THERAPEUTIC ACTIVITIES: CPT

## 2024-08-31 RX ADMIN — SODIUM CHLORIDE, PRESERVATIVE FREE 10 ML: 5 INJECTION INTRAVENOUS at 08:11

## 2024-08-31 RX ADMIN — APIXABAN 5 MG: 5 TABLET, FILM COATED ORAL at 08:04

## 2024-08-31 RX ADMIN — FAMOTIDINE 20 MG: 20 TABLET, FILM COATED ORAL at 08:04

## 2024-08-31 RX ADMIN — POLYETHYLENE GLYCOL 3350 17 G: 17 POWDER, FOR SOLUTION ORAL at 08:06

## 2024-08-31 RX ADMIN — DIGOXIN 125 MCG: 125 TABLET ORAL at 08:05

## 2024-08-31 RX ADMIN — METOPROLOL TARTRATE 12.5 MG: 25 TABLET, FILM COATED ORAL at 08:05

## 2024-08-31 RX ADMIN — SODIUM CHLORIDE, PRESERVATIVE FREE 10 ML: 5 INJECTION INTRAVENOUS at 21:27

## 2024-08-31 RX ADMIN — ACETAMINOPHEN 650 MG: 325 TABLET ORAL at 23:53

## 2024-08-31 RX ADMIN — SENNOSIDES AND DOCUSATE SODIUM 1 TABLET: 50; 8.6 TABLET ORAL at 08:05

## 2024-08-31 RX ADMIN — DILTIAZEM HYDROCHLORIDE 360 MG: 180 CAPSULE, COATED, EXTENDED RELEASE ORAL at 08:02

## 2024-08-31 RX ADMIN — ACETAMINOPHEN 650 MG: 325 TABLET ORAL at 05:17

## 2024-08-31 RX ADMIN — ACETAMINOPHEN 650 MG: 325 TABLET ORAL at 12:10

## 2024-08-31 RX ADMIN — APIXABAN 5 MG: 5 TABLET, FILM COATED ORAL at 21:27

## 2024-08-31 RX ADMIN — METOPROLOL TARTRATE 12.5 MG: 25 TABLET, FILM COATED ORAL at 21:27

## 2024-08-31 ASSESSMENT — PAIN SCALES - GENERAL
PAINLEVEL_OUTOF10: 0

## 2024-08-31 ASSESSMENT — PAIN DESCRIPTION - LOCATION: LOCATION: HIP

## 2024-08-31 ASSESSMENT — PAIN DESCRIPTION - ORIENTATION: ORIENTATION: LEFT

## 2024-08-31 NOTE — PLAN OF CARE
Problem: Physical Therapy - Adult  Goal: By Discharge: Performs mobility at highest level of function for planned discharge setting.  See evaluation for individualized goals.  Description: FUNCTIONAL STATUS PRIOR TO ADMISSION: Patient was independent without DME.    HOME SUPPORT PRIOR TO ADMISSION: The patient lived alone with daughter close by to provide assistance if needed.    Physical Therapy Goals  Initiated 8/27/2024  1.  Patient will move from supine to sit and sit to supine, scoot up and down, and roll side to side in bed with modified independence within 4 day(s).    2.  Patient will perform sit to stand with modified independence within 4 day(s).  3.  Patient will transfer from bed to chair and chair to bed with modified independence using the least restrictive device within 4 day(s).  4.  Patient will ambulate with modified independence for 150 feet with the least restrictive device within 4 day(s).   5.  Patient will perform LE home exercise program per protocol with modified independence within 4 days.      Outcome: Progressing   PHYSICAL THERAPY TREATMENT    Patient: Kenyatta Santana (78 y.o. female)  Date: 8/31/2024  Diagnosis: Closed fracture of neck of left femur, initial encounter ([facility]) [S72.002A]  Closed left hip fracture, initial encounter ([facility]) [S72.002A]  Displaced fracture of left femoral neck ([facility]) [S72.002A] Closed left hip fracture, initial encounter ([facility])  Procedure(s) (LRB):  LEFT BIPOLAR HIP HEMIARTHROPLASTY (Left) 5 Days Post-Op  Precautions:   WBAT; Falls                    ASSESSMENT:  Patient continues to benefit from skilled PT services and is progressing towards goals. She ambulates increased total distance with standing rest breaks due to elevated HR. Per telemetry HR generally 110s-120s with ambulation ~15', then rises to 130s by ~20'. HR returns to 90s with standing rest x 1-3 mins after first three gait trials but remains elevated while standing 3 mins after fourth gait trial.  HR back to 90s with seated rest x 2 mins. BP stable with activity. Pt asymptomatic throughout encounter. She requires CGA to SBA for transfers and ambulation using RW at this time. Per cardiology EP note pt will be cleared for rehab with ventricular rhythm .          PLAN:  Patient continues to benefit from skilled intervention to address the above impairments.  Continue treatment per established plan of care.    Recommend with staff: therapy recommendations for staff: Recommend mobility with staff assist x1 using gait belt and rolling walker.      Recommendation for discharge: (in order for the patient to meet his/her long term goals):   Moderate intensity short-term skilled physical therapy up to 5x/week    Other factors to consider for discharge: lives alone, patient's current support system is unable to meet their requirements for physical assistance, and concern for safely navigating or managing the home environment    IF patient discharges home will need the following DME: continuing to assess with progress       SUBJECTIVE:   Patient stated, \"I'm planning to go to [place] for a little while,\" re: discharge expectations.    Pt received up in chair, agreeable to PT and cleared by RN.      OBJECTIVE DATA SUMMARY:   Critical Behavior:      Pt alert, follows all commands, engages in appropriate conversation    Functional Mobility Training:       Transfers:  Transfer Training  Transfer Training: Yes  Sit to Stand: Contact-guard assistance;Stand-by assistance;Additional time  Stand to Sit: Contact-guard assistance;Stand-by assistance;Additional time  Balance:  Balance  Sitting: Intact  Standing: With support  Standing - Static: Good  Standing - Dynamic: Good   Ambulation/Gait Training:     Gait  Gait Training: Yes  Left Side Weight Bearing: As tolerated  Overall Level of Assistance: Contact-guard assistance;Stand-by assistance  Distance (ft):  (20' x 4 with standing rest breaks due to elevated

## 2024-08-31 NOTE — PROGRESS NOTES
[facility] CARDIOLOGY  Cardiology Note     []Initial Encounter     [x]Follow-up    Patient Name: Kenyatta Santana - :1945 - MRN:[id number]  Primary Cardiologist: WILLIAM ([facility]-Cardiology/[facility]-EP)  Rounding cardiologist:  Dr. lyman     Reason for consult:  Preop evaluation.  24: Reconsulted for afib with RVR    Interval HPI:  This a.m.  pt underwent left bipolar hip hemiarthroplasty.  This afternoon, pt went into afib with RVR with HR as high as 180's-200 per documentation. EKG showed afib with RVR. RRT was called. She was given a total of 3 doses of IV lopressor and she was transferred to [facility]. Upon arrival to [facility], she was noted to be in NSR with HR in the 70's.  Pt reports during RVR episode, she had no chest pain, SOB, dizziness, palpitations or lightheadedness.     SUBJECTIVE:  Denies of any chest pain, shortness of breath, palpitations.   Feels OK      Assessment and Plan       Paroxysmal atrial fibrillation with RVR  - in/out of afib/a-flutter w/ RVR episodes, asymptomatic   - She had stress test at [facility] 2 months ago which reportedly showed no ischemia.  - Echo 24 shows EF 55%, abnormal diastolic function. Mild TR, trace MR.    - Dr. Barajas stopped her flecainide and planned for her Diltiazem, metoprolol and Digoxin  - HR is controlled today ---> cont EP's regimen for Afib rate control   - con Eliquis   - She will follow up outpatient with [facility] cardiologist and EP after rehab  - She may go to rehab if ventricular rate  bpm     ---> I will sign off -- Outpt FU with [facility].   Please call if any questions.       2. Left femoral neck fracture secondary to mechanical fall  -s/p left hip hemiarthroplasty    3.Hypertension  - BP softer, d/c norvasc and hold lisinopril to allow for further rate control    4. Pre diabetes  - A1c 6.4   - Management per hospitalist team    Follow up with [facility] on discharge           ____________________________________________________________    Cardiac

## 2024-08-31 NOTE — PLAN OF CARE
Problem: Physical Therapy - Adult  Goal: By Discharge: Performs mobility at highest level of function for planned discharge setting.  See evaluation for individualized goals.  Description: FUNCTIONAL STATUS PRIOR TO ADMISSION: Patient was independent without DME.    HOME SUPPORT PRIOR TO ADMISSION: The patient lived alone with daughter close by to provide assistance if needed.    Physical Therapy Goals  Initiated 8/27/2024  1.  Patient will move from supine to sit and sit to supine, scoot up and down, and roll side to side in bed with modified independence within 4 day(s).    2.  Patient will perform sit to stand with modified independence within 4 day(s).  3.  Patient will transfer from bed to chair and chair to bed with modified independence using the least restrictive device within 4 day(s).  4.  Patient will ambulate with modified independence for 150 feet with the least restrictive device within 4 day(s).   5.  Patient will perform LE home exercise program per protocol with modified independence within 4 days.      8/30/2024 1535 by Ju Qureshi, PT  Outcome: Progressing     Problem: Safety - Adult  Goal: Free from fall injury  Outcome: Progressing     Problem: Pain  Goal: Verbalizes/displays adequate comfort level or baseline comfort level  Outcome: Progressing  Flowsheets (Taken 8/30/2024 0800 by Jesus Salomon, RN)  Verbalizes/displays adequate comfort level or baseline comfort level:   Encourage patient to monitor pain and request assistance   Assess pain using appropriate pain scale   Administer analgesics based on type and severity of pain and evaluate response     Problem: Skin/Tissue Integrity  Goal: Absence of new skin breakdown  Description: 1.  Monitor for areas of redness and/or skin breakdown  2.  Assess vascular access sites hourly  3.  Every 4-6 hours minimum:  Change oxygen saturation probe site  4.  Every 4-6 hours:  If on nasal continuous positive airway pressure, respiratory therapy assess  nares and determine need for appliance change or resting period.  Outcome: Progressing

## 2024-08-31 NOTE — PROGRESS NOTES
[facility]  [address] [place] [address] [phone]        Hospitalist Progress Note      NAME: Kenyatta Santana   :  1945  MRM:  [id number]    Date/Time: 2024  2:11 PM         Subjective:     Chief Complaint: \"I'm okay\"     Pt seen and examined. No complaints. Questions answered re: plan for a-fib/flutter.     ROS:  (bold if positive, if negative)            Objective:       Vitals:          Last 24hrs VS reviewed since prior progress note. Most recent are:    Vitals:    24 1215   BP:    Pulse: 93   Resp: 21   Temp:    SpO2:      SpO2 Readings from Last 6 Encounters:   24 98%          Intake/Output Summary (Last 24 hours) at 2024 1411  Last data filed at 2024 1032  Gross per 24 hour   Intake 340 ml   Output 600 ml   Net -260 ml          Exam:     Physical Exam:    Gen:  Well-developed, well-nourished, in no acute distress  HEENT:  Pink conjunctivae, PERRL, hearing intact to voice, moist mucous membranes  Neck:  Supple, without masses, thyroid non-tender  Resp:  No accessory muscle use, clear breath sounds without wheezes rales or rhonchi  Card: irregularly irregular   Abd:  Soft, non-tender, non-distended, normoactive bowel sounds are present  Musc:  No cyanosis or clubbing  Skin:  No rashes or ulcers, skin turgor is good  Neuro:  Cranial nerves 3-12 are grossly intact,  strength is 5/5 bilaterally and dorsi / plantarflexion is 5/5 bilaterally, follows commands appropriately  Psych:  Good insight, oriented to person, place and time, alert  L hip incision C/D/I     Medications Reviewed: (see below)    Lab Data Reviewed: (see below)    ______________________________________________________________________    Medications:     Current Facility-Administered Medications   Medication Dose Route Frequency    metoprolol tartrate (LOPRESSOR) tablet 12.5 mg  12.5 mg Oral BID    apixaban (ELIQUIS) tablet 5 mg  5 mg Oral BID    digoxin (LANOXIN)

## 2024-09-01 LAB
ALBUMIN SERPL-MCNC: 2.9 G/DL (ref 3.5–5)
ALBUMIN/GLOB SERPL: 0.8 (ref 1.1–2.2)
ALP SERPL-CCNC: 110 U/L (ref 45–117)
ALT SERPL-CCNC: 46 U/L (ref 12–78)
ANION GAP SERPL CALC-SCNC: 3 MMOL/L (ref 5–15)
AST SERPL-CCNC: 34 U/L (ref 15–37)
BASOPHILS # BLD: 0.1 K/UL (ref 0–0.1)
BASOPHILS NFR BLD: 1 % (ref 0–1)
BILIRUB SERPL-MCNC: 0.5 MG/DL (ref 0.2–1)
BUN SERPL-MCNC: 20 MG/DL (ref 6–20)
BUN/CREAT SERPL: 28 (ref 12–20)
CALCIUM SERPL-MCNC: 9.7 MG/DL (ref 8.5–10.1)
CHLORIDE SERPL-SCNC: 110 MMOL/L (ref 97–108)
CO2 SERPL-SCNC: 26 MMOL/L (ref 21–32)
CREAT SERPL-MCNC: 0.71 MG/DL (ref 0.55–1.02)
DIFFERENTIAL METHOD BLD: ABNORMAL
EOSINOPHIL # BLD: 0.2 K/UL (ref 0–0.4)
EOSINOPHIL NFR BLD: 1 % (ref 0–7)
ERYTHROCYTE [DISTWIDTH] IN BLOOD BY AUTOMATED COUNT: 12.9 % (ref 11.5–14.5)
GLOBULIN SER CALC-MCNC: 3.8 G/DL (ref 2–4)
GLUCOSE SERPL-MCNC: 136 MG/DL (ref 65–100)
HCT VFR BLD AUTO: 39 % (ref 35–47)
HGB BLD-MCNC: 12.6 G/DL (ref 11.5–16)
IMM GRANULOCYTES # BLD AUTO: 0.2 K/UL (ref 0–0.04)
IMM GRANULOCYTES NFR BLD AUTO: 1 % (ref 0–0.5)
LYMPHOCYTES # BLD: 0.9 K/UL (ref 0.8–3.5)
LYMPHOCYTES NFR BLD: 8 % (ref 12–49)
MCH RBC QN AUTO: 30.8 PG (ref 26–34)
MCHC RBC AUTO-ENTMCNC: 32.3 G/DL (ref 30–36.5)
MCV RBC AUTO: 95.4 FL (ref 80–99)
MONOCYTES # BLD: 1 K/UL (ref 0–1)
MONOCYTES NFR BLD: 10 % (ref 5–13)
NEUTS SEG # BLD: 8.5 K/UL (ref 1.8–8)
NEUTS SEG NFR BLD: 79 % (ref 32–75)
NRBC # BLD: 0 K/UL (ref 0–0.01)
NRBC BLD-RTO: 0 PER 100 WBC
PLATELET # BLD AUTO: 309 K/UL (ref 150–400)
PMV BLD AUTO: 11.7 FL (ref 8.9–12.9)
POTASSIUM SERPL-SCNC: 4.2 MMOL/L (ref 3.5–5.1)
PROT SERPL-MCNC: 6.7 G/DL (ref 6.4–8.2)
RBC # BLD AUTO: 4.09 M/UL (ref 3.8–5.2)
SODIUM SERPL-SCNC: 139 MMOL/L (ref 136–145)
WBC # BLD AUTO: 10.8 K/UL (ref 3.6–11)

## 2024-09-01 PROCEDURE — 6370000000 HC RX 637 (ALT 250 FOR IP): Performed by: INTERNAL MEDICINE

## 2024-09-01 PROCEDURE — 80053 COMPREHEN METABOLIC PANEL: CPT

## 2024-09-01 PROCEDURE — 2580000003 HC RX 258: Performed by: PHYSICIAN ASSISTANT

## 2024-09-01 PROCEDURE — 97110 THERAPEUTIC EXERCISES: CPT

## 2024-09-01 PROCEDURE — 94761 N-INVAS EAR/PLS OXIMETRY MLT: CPT

## 2024-09-01 PROCEDURE — 6370000000 HC RX 637 (ALT 250 FOR IP): Performed by: PHYSICIAN ASSISTANT

## 2024-09-01 PROCEDURE — 97116 GAIT TRAINING THERAPY: CPT

## 2024-09-01 PROCEDURE — 2500000003 HC RX 250 WO HCPCS: Performed by: NURSE PRACTITIONER

## 2024-09-01 PROCEDURE — 85025 COMPLETE CBC W/AUTO DIFF WBC: CPT

## 2024-09-01 PROCEDURE — 6370000000 HC RX 637 (ALT 250 FOR IP): Performed by: NURSE PRACTITIONER

## 2024-09-01 PROCEDURE — 6370000000 HC RX 637 (ALT 250 FOR IP): Performed by: HOSPITALIST

## 2024-09-01 PROCEDURE — 2060000000 HC ICU INTERMEDIATE R&B

## 2024-09-01 RX ADMIN — ACETAMINOPHEN 650 MG: 325 TABLET ORAL at 21:53

## 2024-09-01 RX ADMIN — DIGOXIN 125 MCG: 125 TABLET ORAL at 09:27

## 2024-09-01 RX ADMIN — APIXABAN 5 MG: 5 TABLET, FILM COATED ORAL at 09:27

## 2024-09-01 RX ADMIN — SODIUM CHLORIDE, PRESERVATIVE FREE 10 ML: 5 INJECTION INTRAVENOUS at 21:53

## 2024-09-01 RX ADMIN — ACETAMINOPHEN 650 MG: 325 TABLET ORAL at 04:49

## 2024-09-01 RX ADMIN — METOPROLOL TARTRATE 12.5 MG: 25 TABLET, FILM COATED ORAL at 09:26

## 2024-09-01 RX ADMIN — SODIUM CHLORIDE, PRESERVATIVE FREE 10 ML: 5 INJECTION INTRAVENOUS at 09:28

## 2024-09-01 RX ADMIN — METOPROLOL TARTRATE 2.5 MG: 5 INJECTION INTRAVENOUS at 10:04

## 2024-09-01 RX ADMIN — SODIUM CHLORIDE, PRESERVATIVE FREE 10 ML: 5 INJECTION INTRAVENOUS at 09:27

## 2024-09-01 RX ADMIN — ACETAMINOPHEN 650 MG: 325 TABLET ORAL at 16:33

## 2024-09-01 RX ADMIN — ACETAMINOPHEN 650 MG: 325 TABLET ORAL at 10:23

## 2024-09-01 RX ADMIN — APIXABAN 5 MG: 5 TABLET, FILM COATED ORAL at 21:53

## 2024-09-01 RX ADMIN — FAMOTIDINE 20 MG: 20 TABLET, FILM COATED ORAL at 09:27

## 2024-09-01 RX ADMIN — DILTIAZEM HYDROCHLORIDE 360 MG: 180 CAPSULE, COATED, EXTENDED RELEASE ORAL at 09:25

## 2024-09-01 RX ADMIN — METOPROLOL TARTRATE 12.5 MG: 25 TABLET, FILM COATED ORAL at 21:53

## 2024-09-01 NOTE — PLAN OF CARE
Problem: Physical Therapy - Adult  Goal: By Discharge: Performs mobility at highest level of function for planned discharge setting.  See evaluation for individualized goals.  Description: FUNCTIONAL STATUS PRIOR TO ADMISSION: Patient was independent without DME.    HOME SUPPORT PRIOR TO ADMISSION: The patient lived alone with daughter close by to provide assistance if needed.    Physical Therapy Goals  Initiated 8/27/2024  1.  Patient will move from supine to sit and sit to supine, scoot up and down, and roll side to side in bed with modified independence within 4 day(s).    2.  Patient will perform sit to stand with modified independence within 4 day(s).  3.  Patient will transfer from bed to chair and chair to bed with modified independence using the least restrictive device within 4 day(s).  4.  Patient will ambulate with modified independence for 150 feet with the least restrictive device within 4 day(s).   5.  Patient will perform LE home exercise program per protocol with modified independence within 4 days.      8/31/2024 1212 by Temitope Villavicencio, PT  Outcome: Progressing     Problem: Safety - Adult  Goal: Free from fall injury  Outcome: Progressing     Problem: Pain  Goal: Verbalizes/displays adequate comfort level or baseline comfort level  Outcome: Progressing     Problem: Skin/Tissue Integrity  Goal: Absence of new skin breakdown  Description: 1.  Monitor for areas of redness and/or skin breakdown  2.  Assess vascular access sites hourly  3.  Every 4-6 hours minimum:  Change oxygen saturation probe site  4.  Every 4-6 hours:  If on nasal continuous positive airway pressure, respiratory therapy assess nares and determine need for appliance change or resting period.  Outcome: Progressing

## 2024-09-01 NOTE — PLAN OF CARE
Problem: Physical Therapy - Adult  Goal: By Discharge: Performs mobility at highest level of function for planned discharge setting.  See evaluation for individualized goals.  Description: FUNCTIONAL STATUS PRIOR TO ADMISSION: Patient was independent without DME.    HOME SUPPORT PRIOR TO ADMISSION: The patient lived alone with daughter close by to provide assistance if needed.    Physical Therapy Goals  Initiated 8/27/2024  1.  Patient will move from supine to sit and sit to supine, scoot up and down, and roll side to side in bed with modified independence within 4 day(s).    2.  Patient will perform sit to stand with modified independence within 4 day(s).  3.  Patient will transfer from bed to chair and chair to bed with modified independence using the least restrictive device within 4 day(s).  4.  Patient will ambulate with modified independence for 150 feet with the least restrictive device within 4 day(s).   5.  Patient will perform LE home exercise program per protocol with modified independence within 4 days.      Outcome: Progressing     PHYSICAL THERAPY TREATMENT    Patient: Kenyatta Santana (78 y.o. female)  Date: 9/1/2024  Diagnosis: Closed fracture of neck of left femur, initial encounter ([facility]) [S72.002A]  Closed left hip fracture, initial encounter ([facility]) [S72.002A]  Displaced fracture of left femoral neck ([facility]) [S72.002A] Closed left hip fracture, initial encounter ([facility])  Procedure(s) (LRB):  LEFT BIPOLAR HIP HEMIARTHROPLASTY (Left) 6 Days Post-Op  Precautions:                        ASSESSMENT:  Patient continues to benefit from skilled PT services and is progressing towards goals. Patient tolerated session well. Mobilized with SBA for functional transfers and tolerated increased gait distance. HR improved today, at rest in the mid-80's and maintaining in low 100's-110's with activity. Noted brief increase to 120's-low 130's while patient having conversation, improved once cued to focus on breathing,

## 2024-09-01 NOTE — PROGRESS NOTES
[facility] [place]  [address] [place] [address] [phone]        Hospitalist Progress Note      NAME: Kenyatta Santana   :  1945  MRM:  [id number]    Date/Time: 2024  4:45 PM         Subjective:     Chief Complaint: Feels good today. HR runs in the  range.  No chest pain, palpitations, dizziness.                Objective:       Vitals:          Last 24hrs VS reviewed since prior progress note. Most recent are:    Vitals:    24 1530   BP: 119/62   Pulse: 86   Resp: 16   Temp: 98.1 °F (36.7 °C)   SpO2: 98%     SpO2 Readings from Last 6 Encounters:   24 98%          Intake/Output Summary (Last 24 hours) at 2024 1645  Last data filed at 2024 0939  Gross per 24 hour   Intake 240 ml   Output --   Net 240 ml          Exam:     Physical Exam:    Gen:  Well-developed, well-nourished, in no acute distress  HEENT:  Pink conjunctivae, PERRL, hearing intact to voice, moist mucous membranes  Neck:  Supple, without masses, thyroid non-tender  Resp:  No accessory muscle use, clear breath sounds without wheezes rales or rhonchi  Card: irregularly irregular   Abd:  Soft, non-tender, non-distended, normoactive bowel sounds are present  Musc:  No cyanosis or clubbing  Skin:  No rashes or ulcers, skin turgor is good  Neuro:  Cranial nerves 3-12 are grossly intact,  strength is 5/5 bilaterally and dorsi / plantarflexion is 5/5 bilaterally, follows commands appropriately  Psych:  Good insight, oriented to person, place and time, alert  L hip incision C/D/I     Medications Reviewed: (see below)    Lab Data Reviewed: (see below)    ______________________________________________________________________    Medications:     Current Facility-Administered Medications   Medication Dose Route Frequency    metoprolol tartrate (LOPRESSOR) tablet 12.5 mg  12.5 mg Oral BID    apixaban (ELIQUIS) tablet 5 mg  5 mg Oral BID    digoxin (LANOXIN) tablet 125 mcg  125 mcg Oral

## 2024-09-02 PROCEDURE — 6370000000 HC RX 637 (ALT 250 FOR IP): Performed by: NURSE PRACTITIONER

## 2024-09-02 PROCEDURE — 6370000000 HC RX 637 (ALT 250 FOR IP): Performed by: STUDENT IN AN ORGANIZED HEALTH CARE EDUCATION/TRAINING PROGRAM

## 2024-09-02 PROCEDURE — 2580000003 HC RX 258: Performed by: PHYSICIAN ASSISTANT

## 2024-09-02 PROCEDURE — 6370000000 HC RX 637 (ALT 250 FOR IP): Performed by: INTERNAL MEDICINE

## 2024-09-02 PROCEDURE — 97116 GAIT TRAINING THERAPY: CPT

## 2024-09-02 PROCEDURE — 2060000000 HC ICU INTERMEDIATE R&B

## 2024-09-02 PROCEDURE — 6370000000 HC RX 637 (ALT 250 FOR IP): Performed by: HOSPITALIST

## 2024-09-02 PROCEDURE — 6370000000 HC RX 637 (ALT 250 FOR IP): Performed by: PHYSICIAN ASSISTANT

## 2024-09-02 PROCEDURE — 94761 N-INVAS EAR/PLS OXIMETRY MLT: CPT

## 2024-09-02 PROCEDURE — 97530 THERAPEUTIC ACTIVITIES: CPT

## 2024-09-02 RX ORDER — METOPROLOL TARTRATE 25 MG/1
12.5 TABLET, FILM COATED ORAL ONCE
Status: COMPLETED | OUTPATIENT
Start: 2024-09-02 | End: 2024-09-02

## 2024-09-02 RX ORDER — METOPROLOL TARTRATE 25 MG/1
25 TABLET, FILM COATED ORAL 2 TIMES DAILY
Status: DISCONTINUED | OUTPATIENT
Start: 2024-09-02 | End: 2024-09-06

## 2024-09-02 RX ADMIN — METOPROLOL TARTRATE 25 MG: 25 TABLET, FILM COATED ORAL at 21:34

## 2024-09-02 RX ADMIN — ACETAMINOPHEN 650 MG: 325 TABLET ORAL at 04:35

## 2024-09-02 RX ADMIN — DIGOXIN 125 MCG: 125 TABLET ORAL at 08:16

## 2024-09-02 RX ADMIN — SODIUM CHLORIDE, PRESERVATIVE FREE 10 ML: 5 INJECTION INTRAVENOUS at 21:36

## 2024-09-02 RX ADMIN — SODIUM CHLORIDE, PRESERVATIVE FREE 10 ML: 5 INJECTION INTRAVENOUS at 08:17

## 2024-09-02 RX ADMIN — METOPROLOL TARTRATE 12.5 MG: 25 TABLET, FILM COATED ORAL at 08:16

## 2024-09-02 RX ADMIN — APIXABAN 5 MG: 5 TABLET, FILM COATED ORAL at 21:34

## 2024-09-02 RX ADMIN — FAMOTIDINE 20 MG: 20 TABLET, FILM COATED ORAL at 08:16

## 2024-09-02 RX ADMIN — ACETAMINOPHEN 650 MG: 325 TABLET ORAL at 10:00

## 2024-09-02 RX ADMIN — SODIUM CHLORIDE, PRESERVATIVE FREE 10 ML: 5 INJECTION INTRAVENOUS at 08:19

## 2024-09-02 RX ADMIN — ACETAMINOPHEN 650 MG: 325 TABLET ORAL at 21:35

## 2024-09-02 RX ADMIN — APIXABAN 5 MG: 5 TABLET, FILM COATED ORAL at 08:16

## 2024-09-02 RX ADMIN — METOPROLOL TARTRATE 12.5 MG: 25 TABLET, FILM COATED ORAL at 10:00

## 2024-09-02 RX ADMIN — DILTIAZEM HYDROCHLORIDE 360 MG: 180 CAPSULE, COATED, EXTENDED RELEASE ORAL at 08:16

## 2024-09-02 RX ADMIN — ACETAMINOPHEN 650 MG: 325 TABLET ORAL at 16:27

## 2024-09-02 ASSESSMENT — PAIN - FUNCTIONAL ASSESSMENT: PAIN_FUNCTIONAL_ASSESSMENT: ACTIVITIES ARE NOT PREVENTED

## 2024-09-02 ASSESSMENT — PAIN DESCRIPTION - ORIENTATION: ORIENTATION: LEFT

## 2024-09-02 ASSESSMENT — PAIN DESCRIPTION - LOCATION: LOCATION: HIP

## 2024-09-02 ASSESSMENT — PAIN SCALES - GENERAL: PAINLEVEL_OUTOF10: 2

## 2024-09-02 ASSESSMENT — PAIN DESCRIPTION - DESCRIPTORS: DESCRIPTORS: ACHING

## 2024-09-02 NOTE — PLAN OF CARE
Problem: Physical Therapy - Adult  Goal: By Discharge: Performs mobility at highest level of function for planned discharge setting.  See evaluation for individualized goals.  Description: FUNCTIONAL STATUS PRIOR TO ADMISSION: Patient was independent without DME.    HOME SUPPORT PRIOR TO ADMISSION: The patient lived alone with daughter close by to provide assistance if needed.    Physical Therapy Goals  Initiated 8/27/2024  1.  Patient will move from supine to sit and sit to supine, scoot up and down, and roll side to side in bed with modified independence within 4 day(s).    2.  Patient will perform sit to stand with modified independence within 4 day(s).  3.  Patient will transfer from bed to chair and chair to bed with modified independence using the least restrictive device within 4 day(s).  4.  Patient will ambulate with modified independence for 150 feet with the least restrictive device within 4 day(s).   5.  Patient will perform LE home exercise program per protocol with modified independence within 4 days.      Outcome: Progressing   PHYSICAL THERAPY TREATMENT    Patient: Kenyatta Santana (78 y.o. female)  Date: 9/2/2024  Diagnosis: Closed fracture of neck of left femur, initial encounter ([facility]) [S72.002A]  Closed left hip fracture, initial encounter ([facility]) [S72.002A]  Displaced fracture of left femoral neck ([facility]) [S72.002A] Closed left hip fracture, initial encounter ([facility])  Procedure(s) (LRB):  LEFT BIPOLAR HIP HEMIARTHROPLASTY (Left) 7 Days Post-Op  Precautions:                        ASSESSMENT:  Patient continues to benefit from skilled PT services.Pt sitting in chair on arrival of PT.Pt sit to with CGA.Pt ambulated 100ft with RW CGA.Pts HR was 86 to 108 bpm with mobility.Pt left sitting.Pt tolerated treatment well.Continue goals.         PLAN:  Patient continues to benefit from skilled intervention to address the above impairments.  Continue treatment per established plan of care.        Recommendation

## 2024-09-02 NOTE — PLAN OF CARE
Problem: Physical Therapy - Adult  Goal: By Discharge: Performs mobility at highest level of function for planned discharge setting.  See evaluation for individualized goals.  Description: FUNCTIONAL STATUS PRIOR TO ADMISSION: Patient was independent without DME.    HOME SUPPORT PRIOR TO ADMISSION: The patient lived alone with daughter close by to provide assistance if needed.    Physical Therapy Goals  Initiated 8/27/2024  1.  Patient will move from supine to sit and sit to supine, scoot up and down, and roll side to side in bed with modified independence within 4 day(s).    2.  Patient will perform sit to stand with modified independence within 4 day(s).  3.  Patient will transfer from bed to chair and chair to bed with modified independence using the least restrictive device within 4 day(s).  4.  Patient will ambulate with modified independence for 150 feet with the least restrictive device within 4 day(s).   5.  Patient will perform LE home exercise program per protocol with modified independence within 4 days.      9/1/2024 1510 by Julian Chou, PT  Outcome: Progressing     Problem: Safety - Adult  Goal: Free from fall injury  Outcome: Progressing     Problem: Pain  Goal: Verbalizes/displays adequate comfort level or baseline comfort level  Outcome: Progressing     Problem: Skin/Tissue Integrity  Goal: Absence of new skin breakdown  Description: 1.  Monitor for areas of redness and/or skin breakdown  2.  Assess vascular access sites hourly  3.  Every 4-6 hours minimum:  Change oxygen saturation probe site  4.  Every 4-6 hours:  If on nasal continuous positive airway pressure, respiratory therapy assess nares and determine need for appliance change or resting period.  Outcome: Progressing

## 2024-09-02 NOTE — PROGRESS NOTES
[facility]  [address] [place] [address] [phone]        Hospitalist Progress Note      NAME: Kenyatta Santana   :  1945  MRM:  [id number]    Date/Time: 2024  2:39 PM         Subjective:     Chief Complaint: No acute events.  Heart rate still uncontrolled.  Heart rate goes high especially with ambulation.  No chest pain, palpitations.  Awaiting for placement.                Objective:       Vitals:          Last 24hrs VS reviewed since prior progress note. Most recent are:    Vitals:    24 1126   BP: 122/89   Pulse: 70   Resp:    Temp: 98.2 °F (36.8 °C)   SpO2: 98%     SpO2 Readings from Last 6 Encounters:   24 98%          Intake/Output Summary (Last 24 hours) at 2024 1439  Last data filed at 2024 0900  Gross per 24 hour   Intake 240 ml   Output 800 ml   Net -560 ml          Exam:     Physical Exam:    Gen:  Well-developed, well-nourished, in no acute distress  HEENT:  Pink conjunctivae, PERRL, hearing intact to voice, moist mucous membranes  Neck:  Supple, without masses, thyroid non-tender  Resp:  No accessory muscle use, clear breath sounds without wheezes rales or rhonchi  Card: irregularly irregular   Abd:  Soft, non-tender, non-distended, normoactive bowel sounds are present  Musc:  No cyanosis or clubbing  Skin:  No rashes or ulcers, skin turgor is good  Neuro:  Cranial nerves 3-12 are grossly intact,  strength is 5/5 bilaterally and dorsi / plantarflexion is 5/5 bilaterally, follows commands appropriately  Psych:  Good insight, oriented to person, place and time, alert  L hip incision C/D/I     Medications Reviewed: (see below)    Lab Data Reviewed: (see below)    ______________________________________________________________________    Medications:     Current Facility-Administered Medications   Medication Dose Route Frequency    metoprolol tartrate (LOPRESSOR) tablet 25 mg  25 mg Oral BID    apixaban (ELIQUIS) tablet 5 mg

## 2024-09-02 NOTE — CARE COORDINATION
9/2/2024  9:11 AM  Following chart review, CM informed attending Liliam Thomas is unable to admit pt today due to holiday, but will potentially have a bed tomorrow.

## 2024-09-02 NOTE — PLAN OF CARE
Problem: Physical Therapy - Adult  Goal: By Discharge: Performs mobility at highest level of function for planned discharge setting.  See evaluation for individualized goals.  Description: FUNCTIONAL STATUS PRIOR TO ADMISSION: Patient was independent without DME.    HOME SUPPORT PRIOR TO ADMISSION: The patient lived alone with daughter close by to provide assistance if needed.    Physical Therapy Goals  Initiated 8/27/2024  1.  Patient will move from supine to sit and sit to supine, scoot up and down, and roll side to side in bed with modified independence within 4 day(s).    2.  Patient will perform sit to stand with modified independence within 4 day(s).  3.  Patient will transfer from bed to chair and chair to bed with modified independence using the least restrictive device within 4 day(s).  4.  Patient will ambulate with modified independence for 150 feet with the least restrictive device within 4 day(s).   5.  Patient will perform LE home exercise program per protocol with modified independence within 4 days.      9/2/2024 1523 by Cuate Thompson, PTA  Outcome: Progressing  9/2/2024 1335 by Cuate Thompson, PTA  Outcome: Progressing   PHYSICAL THERAPY TREATMENT    Patient: Kenyatta Santana (78 y.o. female)  Date: 9/2/2024  Diagnosis: Closed fracture of neck of left femur, initial encounter ([facility]) [S72.002A]  Closed left hip fracture, initial encounter ([facility]) [S72.002A]  Displaced fracture of left femoral neck ([facility]) [S72.002A] Closed left hip fracture, initial encounter ([facility])  Procedure(s) (LRB):  LEFT BIPOLAR HIP HEMIARTHROPLASTY (Left) 7 Days Post-Op  Precautions:                        ASSESSMENT:  Patient continues to benefit from skilled PT services.Pt seen for BID treatment.Pt performed sitting exercise with cues.Pt sit to stand with CGA.Pt ambulated 125ft with RW CGA.Pt tolerated treatment well.Continue goals.         PLAN:  Patient continues to benefit from skilled intervention to address the above

## 2024-09-02 NOTE — PROGRESS NOTES
Cardiology Update:     Asked to re evaluate HR today, up into the 150's at times. Metoprolol increased and HR now improved again, agree w/ med change. Cont dilt and digoxin. Will see again tomorrow.

## 2024-09-03 LAB — DIGOXIN SERPL-MCNC: 1.1 NG/ML (ref 0.9–2)

## 2024-09-03 PROCEDURE — 6370000000 HC RX 637 (ALT 250 FOR IP): Performed by: PHYSICIAN ASSISTANT

## 2024-09-03 PROCEDURE — 97535 SELF CARE MNGMENT TRAINING: CPT | Performed by: OCCUPATIONAL THERAPIST

## 2024-09-03 PROCEDURE — 6360000002 HC RX W HCPCS: Performed by: NURSE PRACTITIONER

## 2024-09-03 PROCEDURE — 97110 THERAPEUTIC EXERCISES: CPT

## 2024-09-03 PROCEDURE — 36415 COLL VENOUS BLD VENIPUNCTURE: CPT

## 2024-09-03 PROCEDURE — 2060000000 HC ICU INTERMEDIATE R&B

## 2024-09-03 PROCEDURE — 6370000000 HC RX 637 (ALT 250 FOR IP): Performed by: NURSE PRACTITIONER

## 2024-09-03 PROCEDURE — 2580000003 HC RX 258: Performed by: PHYSICIAN ASSISTANT

## 2024-09-03 PROCEDURE — 2580000003 HC RX 258: Performed by: NURSE PRACTITIONER

## 2024-09-03 PROCEDURE — 6370000000 HC RX 637 (ALT 250 FOR IP): Performed by: INTERNAL MEDICINE

## 2024-09-03 PROCEDURE — 99232 SBSQ HOSP IP/OBS MODERATE 35: CPT | Performed by: SPECIALIST

## 2024-09-03 PROCEDURE — 94761 N-INVAS EAR/PLS OXIMETRY MLT: CPT

## 2024-09-03 PROCEDURE — 6370000000 HC RX 637 (ALT 250 FOR IP): Performed by: HOSPITALIST

## 2024-09-03 PROCEDURE — 6370000000 HC RX 637 (ALT 250 FOR IP): Performed by: STUDENT IN AN ORGANIZED HEALTH CARE EDUCATION/TRAINING PROGRAM

## 2024-09-03 PROCEDURE — 97116 GAIT TRAINING THERAPY: CPT

## 2024-09-03 PROCEDURE — APPSS30 APP SPLIT SHARED TIME 16-30 MINUTES: Performed by: NURSE PRACTITIONER

## 2024-09-03 PROCEDURE — 80162 ASSAY OF DIGOXIN TOTAL: CPT

## 2024-09-03 RX ADMIN — AMIODARONE HYDROCHLORIDE 150 MG: 50 INJECTION, SOLUTION INTRAVENOUS at 10:49

## 2024-09-03 RX ADMIN — ACETAMINOPHEN 650 MG: 325 TABLET ORAL at 21:21

## 2024-09-03 RX ADMIN — AMIODARONE HYDROCHLORIDE 0.5 MG/MIN: 50 INJECTION, SOLUTION INTRAVENOUS at 16:12

## 2024-09-03 RX ADMIN — DILTIAZEM HYDROCHLORIDE 360 MG: 180 CAPSULE, COATED, EXTENDED RELEASE ORAL at 08:24

## 2024-09-03 RX ADMIN — ACETAMINOPHEN 650 MG: 325 TABLET ORAL at 10:50

## 2024-09-03 RX ADMIN — SODIUM CHLORIDE, PRESERVATIVE FREE 10 ML: 5 INJECTION INTRAVENOUS at 08:40

## 2024-09-03 RX ADMIN — METOPROLOL TARTRATE 25 MG: 25 TABLET, FILM COATED ORAL at 08:24

## 2024-09-03 RX ADMIN — AMIODARONE HYDROCHLORIDE 1 MG/MIN: 50 INJECTION, SOLUTION INTRAVENOUS at 11:37

## 2024-09-03 RX ADMIN — AMIODARONE HYDROCHLORIDE 0.5 MG/MIN: 50 INJECTION, SOLUTION INTRAVENOUS at 22:46

## 2024-09-03 RX ADMIN — DIGOXIN 125 MCG: 125 TABLET ORAL at 08:31

## 2024-09-03 RX ADMIN — SODIUM CHLORIDE, PRESERVATIVE FREE 10 ML: 5 INJECTION INTRAVENOUS at 08:24

## 2024-09-03 RX ADMIN — ACETAMINOPHEN 650 MG: 325 TABLET ORAL at 16:12

## 2024-09-03 RX ADMIN — METOPROLOL TARTRATE 25 MG: 25 TABLET, FILM COATED ORAL at 21:21

## 2024-09-03 RX ADMIN — APIXABAN 5 MG: 5 TABLET, FILM COATED ORAL at 21:21

## 2024-09-03 RX ADMIN — ACETAMINOPHEN 650 MG: 325 TABLET ORAL at 06:46

## 2024-09-03 RX ADMIN — APIXABAN 5 MG: 5 TABLET, FILM COATED ORAL at 08:24

## 2024-09-03 RX ADMIN — FAMOTIDINE 20 MG: 20 TABLET, FILM COATED ORAL at 08:24

## 2024-09-03 ASSESSMENT — PAIN SCALES - GENERAL
PAINLEVEL_OUTOF10: 0

## 2024-09-03 NOTE — CARE COORDINATION
2:13 PM  09/03/24    Care Management Progress Note    Reason for Admission:   Closed fracture of neck of left femur, initial encounter ([facility]) [S72.002A]  Closed left hip fracture, initial encounter ([facility]) [S72.002A]  Displaced fracture of left femoral neck ([facility]) [S72.002A]  Procedure(s) (LRB):  LEFT BIPOLAR HIP HEMIARTHROPLASTY (Left)  8 Days Post-Op    Patient Admission Status: Inpatient  RUR: Readmission Risk Score: 11.6    Hospitalization in the last 30 days (Readmission):  No        Transition of care plan:  Ongoing medical management- pt discussed during IDR; cardiology following.  Anticipated discharge plan: Short term SNF rehab at [facility]. SNF following for medical clearance.  Outpatient follow-up.  Transportation TBD.      CM will continue to follow and assist with discharge needs.    SEGUNDO Rai

## 2024-09-03 NOTE — PROGRESS NOTES
Physical Therapy    Chart reviewed and spoke with RN.  Patient continues to remain in A-fib, A-flutter per cardiology, despite medications, considering cardioversion.  RN reports currently getting amio started but patient may be having reaction to it.  Requests deferral at this time.  Will follow back later today as able and appropriate.    Beronica Montenegro, MS, PT

## 2024-09-03 NOTE — PROGRESS NOTES
[facility]  [address] [place] [address] [phone]        Hospitalist Progress Note      NAME: Kenyatta Santana   :  1945  MRM:  [id number]    Date/Time: 9/3/2024  12:06 PM         Subjective:     Chief Complaint: Patient report no complaints at this time. HR still uncontrolled despite adjustment of her medications.              Objective:       Vitals:          Last 24hrs VS reviewed since prior progress note. Most recent are:    Vitals:    24 1200   BP: 102/66   Pulse:    Resp:    Temp:    SpO2:      SpO2 Readings from Last 6 Encounters:   24 95%          Intake/Output Summary (Last 24 hours) at 9/3/2024 1206  Last data filed at 9/3/2024 1130  Gross per 24 hour   Intake 340.21 ml   Output 600 ml   Net -259.79 ml          Exam:     Physical Exam:    Gen:  Well-developed, well-nourished, in no acute distress  HEENT:  Pink conjunctivae, PERRL, hearing intact to voice, moist mucous membranes  Neck:  Supple, without masses, thyroid non-tender  Resp:  No accessory muscle use, clear breath sounds without wheezes rales or rhonchi  Card: irregularly irregular , tachycardic  Abd:  Soft, non-tender, non-distended, normoactive bowel sounds are present  Musc:  No cyanosis or clubbing  Skin:  No rashes or ulcers, skin turgor is good  Neuro:  Cranial nerves 3-12 are grossly intact,  strength is 5/5 bilaterally and dorsi / plantarflexion is 5/5 bilaterally, follows commands appropriately  Psych:  Good insight, oriented to person, place and time, alert  L hip incision C/D/I     Medications Reviewed: (see below)    Lab Data Reviewed: (see below)    ______________________________________________________________________    Medications:     Current Facility-Administered Medications   Medication Dose Route Frequency    amiodarone (CORDARONE) 450 mg in dextrose 5 % 250 mL infusion ([id number])  1 mg/min IntraVENous Continuous    Followed by    amiodarone (CORDARONE) 450  IntraVENous PRN    0.9 % sodium chloride infusion   IntraVENous PRN    polyethylene glycol (GLYCOLAX) packet 17 g  17 g Oral Daily PRN            Lab Review:     Recent Labs     09/01/24  0956   WBC 10.8   HGB 12.6   HCT 39.0        Recent Labs     09/01/24  0956      K 4.2   *   CO2 26   BUN 20   ALT 46     No components found for: \"GLPOC\"         Assessment / Plan:   Paroxysmal atrial fibrillation with RVR  Secondary hypercoagulable state due to A-fib  Long-term anticoagulation with Eliquis  -EP consulted, she had normal stress test 2 months ago, echo 8/25/2024 showed EF 55%, abnormal diastolic function, mild TR. continue diltiazem, metoprolol and digoxin.  Continue Eliquis.  Cardiology following, started on amiodarone bolus followed by drip, may need cardioversion    Left femoral neck fracture due to mechanical fall, POA  -s/p left hip hemiarthroplasty/20 6/2024  -continue tylenol   -PRN pain meds   -PT/OT   -SNF , can be discharged once medically ready    Leukocytosis, likely reactive d/t fracture, POA. Resolved  -monitor     HTN - holding ACE I and amlodipine to allow for dilt and metoprolol   -monitor on dilt, metoprolol; uptitrate metoprolol as BP allows     PreDM - A1c 6.4  -monitor BG on BMP   -diabetic diet     CKD - ruled out   -monitor     Overweight (25.0 - 29.9): Body mass index is 25.06 kg/m².:    -weight loss     Total time spent with patient: 35 Minutes                  Care Plan discussed with: Patient, Family, and Nursing Staff    Discussed:  Care Plan    Prophylaxis:   eliquis    Disposition:  SNF/LTC; once medically ready.         ___________________________________________________    Attending Physician: Eliel Short MD

## 2024-09-03 NOTE — PLAN OF CARE
Problem: Occupational Therapy - Adult  Goal: By Discharge: Performs self-care activities at highest level of function for planned discharge setting.  See evaluation for individualized goals.  Description: FUNCTIONAL STATUS PRIOR TO ADMISSION:  Patient is independent for self care and functional transfers/mobility.     HOME SUPPORT: Patient lived alone however with good support via daughter and niece that live close by. Pt stating daughter may be able to stay with pt upon discharge.     Occupational Therapy Goals:  Initiated 8/27/2024.  Reviewed 9/3/24 and all goals remain appropriate to continue for the next 7 days.  1.  Patient will perform grooming with Modified [place] within 7 day(s).  2.  Patient will perform upper body dressing with [place] within 7 day(s).  3.  Patient will perform lower body dressing with Modified [place] within 7 day(s).  4.  Patient will perform toilet transfers with Modified [place]  within 7 day(s).  5.  Patient will perform all aspects of toileting with Modified [place] within 7 day(s).  6.  Patient will participate in upper extremity therapeutic exercise/activities with [place] for 10 minutes within 7 day(s).    7.  Patient will utilize energy conservation techniques during functional activities with verbal cues within 7 day(s).    Outcome: Progressing     OCCUPATIONAL THERAPY TREATMENT: WEEKLY REASSESSMENT    Patient: Kenyatta Santana (78 y.o. female)  Date: 9/3/2024  Primary Diagnosis: Closed fracture of neck of left femur, initial encounter ([facility]) [S72.002A]  Closed left hip fracture, initial encounter ([facility]) [S72.002A]  Displaced fracture of left femoral neck ([facility]) [S72.002A]  Procedure(s) (LRB):  LEFT BIPOLAR HIP HEMIARTHROPLASTY (Left) 8 Days Post-Op   Precautions: Weight Bearing, Fall Risk   Left Lower Extremity Weight Bearing: Weight Bearing As Tolerated         Hip Precautions: Posterior hip precautions  Chart, occupational therapy assessment, plan  feet to facilitate fall prevention, pain management, and energy conservation with Contact Guard Assist. Instructed on all components of hip kit with Good understanding.      Home safety: Patient instructed on home modifications and safety (raise height of ADL objects, appropriate height of chair surfaces, recliner safety, change of floor surfaces, clear pathways) to increase independence and fall prevention.  Patient indicated understanding.     Standing: Patient instructed and demonstrated to walk up to sink/countertop/surfaces, step into walker to increase safety of joint and fall prevention with Contact Guard Assist. Instructed to apply concept of hip contraindications to ADLs within the home (no extreme reaching across body to right side, square off while using objects, slide objects along surfaces).  Patient instructed to increase amount of time standing, observe standing position during ADLs to increase even weight bearing through bilateral LEs to increase independence with ADLs.  Goal to be reached 30 days post - op, per orthopedic surgeon or per PT.  Patient indicated understanding.     Transfer Training  Transfer Training: Yes  Overall Level of Assistance: Contact-guard assistance;Additional time  Interventions: Safety awareness training;Tactile cues;Verbal cues  Sit to Stand: Contact-guard assistance  Stand to Sit: Contact-guard assistance  Toilet Transfer: Contact-guard assistance (RW and toilet in bathroom)      Pain Ratin/10     Activity Tolerance:   Fair  and requires rest breaks  Please refer to the flowsheet for vital signs taken during this treatment.    After treatment:   Patient left in no apparent distress sitting up in chair and Call bell within reach    COMMUNICATION/EDUCATION:   The patient's plan of care was discussed with: physical therapist and registered nurse    Patient Education  Education Given To: Patient  Education Provided: Role of Therapy;Energy Conservation;Fall Prevention

## 2024-09-03 NOTE — PROGRESS NOTES
[facility] CARDIOLOGY  Cardiology Note     []Initial Encounter     [x]Follow-up    Patient Name: Kenyatta Santana - :1945 - MRN:[id number]  Primary Cardiologist: WILLIAM ([facility]-Cardiology/Rehorn-EP)  Rounding cardiologist:  Dr. lyman     Reason for consult:  Preop evaluation.  24: Reconsulted for afib with RVR    Interval HPI:  This a.m.  pt underwent left bipolar hip hemiarthroplasty.  This afternoon, pt went into afib with RVR with HR as high as 180's-200 per documentation. EKG showed afib with RVR. RRT was called. She was given a total of 3 doses of IV lopressor and she was transferred to [facility]. Upon arrival to [facility], she was noted to be in NSR with HR in the 70's.  Pt reports during RVR episode, she had no chest pain, SOB, dizziness, palpitations or lightheadedness.     SUBJECTIVE:  Denies of any chest pain, shortness of breath, palpitations. HR increases to 140-150's with any activity       Assessment and Plan       Paroxysmal atrial fibrillation with RVR  - has remained in a-flutter for > 24 hrs (previously paroxsymal) despite medication adjustments   - She had stress test at [facility] 2 months ago which reportedly showed no ischemia.  - Echo 24 shows EF 55%, abnormal diastolic function. Mild TR, trace MR.    - Dr. Barajas stopped her flecainide and planned for her Diltiazem, metoprolol and Digoxin. Increased metoprolol   - con Eliquis   - now that she is remaining in AF, may need to consider cardioversion      2. Left femoral neck fracture secondary to mechanical fall  -s/p left hip hemiarthroplasty    3.Hypertension  - BP softer, d/c norvasc and hold lisinopril to allow for further rate control    4. Pre diabetes  - A1c 6.4   - Management per hospitalist team    Follow up with [facility] on discharge        CARDIOLOGY ATTENDING  Patient personally seen and examined. All the elements of history and examination were personally performed. Assessment and plan was discussed and agree.      Back in Afib.    NAD, Irreg Irreg, no murmur, lungs clear, abd soft, no sig edema  Tele - Afib     1) Paroxysmal atrial fibrillation with RVR  - in/out of afib/a-flutter w/ RVR episodes, asymptomatic   - She had stress test at [facility] 2 months ago which reportedly showed no ischemia.  - Echo 8/25/24 shows EF 55%, abnormal diastolic function. Mild TR, trace MR.    - Dr. Barajas on 8/30/24 stopped her flecainide and planned for her Diltiazem, metoprolol and Digoxin  - On 9/3/24 - she is back in Afib with rapid rates (up to 140's)   ---> Given difficulty with rate control, will try and restore NSR   - Will start loading her with Amiodarone and plan for AMARJIT guided DCC if she remains in Afib   -  cont BB and Dilt.  Stop Digoxin.   - con Eliquis      2) Left femoral neck fracture secondary to mechanical fall  -s/p left hip hemiarthroplasty     3) Hypertension  - BP soft, d/c norvasc and hold lisinopril to allow for further rate control        Follow up with [facility] on discharge       Manuel Julian MD, FACC                  ____________________________________________________________    Cardiac testing  08/24/24    ECHO (TTE) COMPLETE (PRN CONTRAST/BUBBLE/STRAIN/3D) 08/26/2024  8:55 AM (Final)    Interpretation Summary    Left Ventricle: Normal left ventricular systolic function. EF by visual approximation is 55%. Not well visualized. Left ventricle size is normal. Normal wall thickness. Unable to assess wall motion. Abnormal diastolic function.    Right Ventricle: Not well visualized. Right ventricle size is normal.    Aortic Valve: Not well visualized. No significant stenosis. AV mean gradient is 12 mmHg. AV peak gradient is 22 mmHg.    Mitral Valve: Not well visualized. Valve structure is normal. Trace regurgitation.    Tricuspid Valve: Mild regurgitation. Normal RVSP. The estimated RVSP is 21 mmHg.    Pulmonic Valve: The pulmonic valve was not well visualized.    Interatrial Septum: Interatrial septum was not well visualized.    Pericardium:

## 2024-09-03 NOTE — PLAN OF CARE
Problem: Physical Therapy - Adult  Goal: By Discharge: Performs mobility at highest level of function for planned discharge setting.  See evaluation for individualized goals.  Description: FUNCTIONAL STATUS PRIOR TO ADMISSION: Patient was independent without DME.    HOME SUPPORT PRIOR TO ADMISSION: The patient lived alone with daughter close by to provide assistance if needed.    Physical Therapy Goals  Initiated 8/27/2024  1.  Patient will move from supine to sit and sit to supine, scoot up and down, and roll side to side in bed with modified independence within 4 day(s).    2.  Patient will perform sit to stand with modified independence within 4 day(s).  3.  Patient will transfer from bed to chair and chair to bed with modified independence using the least restrictive device within 4 day(s).  4.  Patient will ambulate with modified independence for 150 feet with the least restrictive device within 4 day(s).   5.  Patient will perform LE home exercise program per protocol with modified independence within 4 days.      Outcome: Progressing   PHYSICAL THERAPY TREATMENT: WEEKLY RE-ASSESSMENT    Patient: Kenyatta Santana (78 y.o. female)  Date: 9/3/2024  Diagnosis: Closed fracture of neck of left femur, initial encounter ([facility]) [S72.002A]  Closed left hip fracture, initial encounter ([facility]) [S72.002A]  Displaced fracture of left femoral neck ([facility]) [S72.002A] Closed left hip fracture, initial encounter ([facility])  Procedure(s) (LRB):  LEFT BIPOLAR HIP HEMIARTHROPLASTY (Left) 8 Days Post-Op  Precautions: Weight Bearing, Fall Risk   Left Lower Extremity Weight Bearing: Weight Bearing As Tolerated         Hip Precautions: Posterior hip precautions        ASSESSMENT:  Patient continues to benefit from skilled PT services and is progressing towards goals. Patient cleared to be seen by RN after amio drip started with patient responding well, determined no reaction was occurring, per RN.  HR at rest seated when received was in 70s to

## 2024-09-04 ENCOUNTER — APPOINTMENT (OUTPATIENT)
Facility: HOSPITAL | Age: 79
End: 2024-09-04
Payer: MEDICARE

## 2024-09-04 LAB
ALBUMIN SERPL-MCNC: 2.7 G/DL (ref 3.5–5)
ALBUMIN/GLOB SERPL: 0.8 (ref 1.1–2.2)
ALP SERPL-CCNC: 109 U/L (ref 45–117)
ALT SERPL-CCNC: 62 U/L (ref 12–78)
ANION GAP SERPL CALC-SCNC: 2 MMOL/L (ref 5–15)
AST SERPL-CCNC: 35 U/L (ref 15–37)
BASOPHILS # BLD: 0.1 K/UL (ref 0–0.1)
BASOPHILS NFR BLD: 1 % (ref 0–1)
BILIRUB SERPL-MCNC: 0.4 MG/DL (ref 0.2–1)
BUN SERPL-MCNC: 18 MG/DL (ref 6–20)
BUN/CREAT SERPL: 25 (ref 12–20)
CALCIUM SERPL-MCNC: 9.5 MG/DL (ref 8.5–10.1)
CHLORIDE SERPL-SCNC: 110 MMOL/L (ref 97–108)
CO2 SERPL-SCNC: 27 MMOL/L (ref 21–32)
CREAT SERPL-MCNC: 0.73 MG/DL (ref 0.55–1.02)
DIFFERENTIAL METHOD BLD: ABNORMAL
EOSINOPHIL # BLD: 0.2 K/UL (ref 0–0.4)
EOSINOPHIL NFR BLD: 1 % (ref 0–7)
ERYTHROCYTE [DISTWIDTH] IN BLOOD BY AUTOMATED COUNT: 13.1 % (ref 11.5–14.5)
GLOBULIN SER CALC-MCNC: 3.4 G/DL (ref 2–4)
GLUCOSE SERPL-MCNC: 169 MG/DL (ref 65–100)
HCT VFR BLD AUTO: 36.4 % (ref 35–47)
HGB BLD-MCNC: 11.6 G/DL (ref 11.5–16)
IMM GRANULOCYTES # BLD AUTO: 0.2 K/UL (ref 0–0.04)
IMM GRANULOCYTES NFR BLD AUTO: 2 % (ref 0–0.5)
LYMPHOCYTES # BLD: 0.8 K/UL (ref 0.8–3.5)
LYMPHOCYTES NFR BLD: 7 % (ref 12–49)
MCH RBC QN AUTO: 30.4 PG (ref 26–34)
MCHC RBC AUTO-ENTMCNC: 31.9 G/DL (ref 30–36.5)
MCV RBC AUTO: 95.3 FL (ref 80–99)
MONOCYTES # BLD: 0.9 K/UL (ref 0–1)
MONOCYTES NFR BLD: 8 % (ref 5–13)
NEUTS SEG # BLD: 9.2 K/UL (ref 1.8–8)
NEUTS SEG NFR BLD: 81 % (ref 32–75)
NRBC # BLD: 0 K/UL (ref 0–0.01)
NRBC BLD-RTO: 0 PER 100 WBC
PLATELET # BLD AUTO: 301 K/UL (ref 150–400)
PMV BLD AUTO: 11.2 FL (ref 8.9–12.9)
POTASSIUM SERPL-SCNC: 4.3 MMOL/L (ref 3.5–5.1)
PROT SERPL-MCNC: 6.1 G/DL (ref 6.4–8.2)
RBC # BLD AUTO: 3.82 M/UL (ref 3.8–5.2)
SODIUM SERPL-SCNC: 139 MMOL/L (ref 136–145)
WBC # BLD AUTO: 11.4 K/UL (ref 3.6–11)

## 2024-09-04 PROCEDURE — 2580000003 HC RX 258: Performed by: PHYSICIAN ASSISTANT

## 2024-09-04 PROCEDURE — 6370000000 HC RX 637 (ALT 250 FOR IP): Performed by: NURSE PRACTITIONER

## 2024-09-04 PROCEDURE — 2060000000 HC ICU INTERMEDIATE R&B

## 2024-09-04 PROCEDURE — 97116 GAIT TRAINING THERAPY: CPT

## 2024-09-04 PROCEDURE — 97530 THERAPEUTIC ACTIVITIES: CPT

## 2024-09-04 PROCEDURE — 6370000000 HC RX 637 (ALT 250 FOR IP): Performed by: SPECIALIST

## 2024-09-04 PROCEDURE — 94761 N-INVAS EAR/PLS OXIMETRY MLT: CPT

## 2024-09-04 PROCEDURE — 36415 COLL VENOUS BLD VENIPUNCTURE: CPT

## 2024-09-04 PROCEDURE — 85025 COMPLETE CBC W/AUTO DIFF WBC: CPT

## 2024-09-04 PROCEDURE — APPSS30 APP SPLIT SHARED TIME 16-30 MINUTES: Performed by: NURSE PRACTITIONER

## 2024-09-04 PROCEDURE — 6370000000 HC RX 637 (ALT 250 FOR IP): Performed by: PHYSICIAN ASSISTANT

## 2024-09-04 PROCEDURE — 6360000002 HC RX W HCPCS: Performed by: NURSE PRACTITIONER

## 2024-09-04 PROCEDURE — 2580000003 HC RX 258: Performed by: NURSE PRACTITIONER

## 2024-09-04 PROCEDURE — 97535 SELF CARE MNGMENT TRAINING: CPT

## 2024-09-04 PROCEDURE — 97110 THERAPEUTIC EXERCISES: CPT

## 2024-09-04 PROCEDURE — 99232 SBSQ HOSP IP/OBS MODERATE 35: CPT | Performed by: SPECIALIST

## 2024-09-04 PROCEDURE — 80053 COMPREHEN METABOLIC PANEL: CPT

## 2024-09-04 PROCEDURE — 6370000000 HC RX 637 (ALT 250 FOR IP): Performed by: STUDENT IN AN ORGANIZED HEALTH CARE EDUCATION/TRAINING PROGRAM

## 2024-09-04 RX ORDER — DILTIAZEM HYDROCHLORIDE 300 MG/1
300 CAPSULE, COATED, EXTENDED RELEASE ORAL DAILY
Status: DISCONTINUED | OUTPATIENT
Start: 2024-09-04 | End: 2024-09-06 | Stop reason: HOSPADM

## 2024-09-04 RX ADMIN — APIXABAN 5 MG: 5 TABLET, FILM COATED ORAL at 20:51

## 2024-09-04 RX ADMIN — METOPROLOL TARTRATE 25 MG: 25 TABLET, FILM COATED ORAL at 20:51

## 2024-09-04 RX ADMIN — ACETAMINOPHEN 650 MG: 325 TABLET ORAL at 13:33

## 2024-09-04 RX ADMIN — SODIUM CHLORIDE, PRESERVATIVE FREE 10 ML: 5 INJECTION INTRAVENOUS at 08:48

## 2024-09-04 RX ADMIN — DILTIAZEM HYDROCHLORIDE 300 MG: 300 CAPSULE, EXTENDED RELEASE ORAL at 08:47

## 2024-09-04 RX ADMIN — ACETAMINOPHEN 650 MG: 325 TABLET ORAL at 16:55

## 2024-09-04 RX ADMIN — APIXABAN 5 MG: 5 TABLET, FILM COATED ORAL at 08:47

## 2024-09-04 RX ADMIN — ACETAMINOPHEN 650 MG: 325 TABLET ORAL at 23:31

## 2024-09-04 RX ADMIN — METOPROLOL TARTRATE 25 MG: 25 TABLET, FILM COATED ORAL at 08:47

## 2024-09-04 RX ADMIN — FAMOTIDINE 20 MG: 20 TABLET, FILM COATED ORAL at 08:47

## 2024-09-04 RX ADMIN — SODIUM CHLORIDE, PRESERVATIVE FREE 10 ML: 5 INJECTION INTRAVENOUS at 20:51

## 2024-09-04 RX ADMIN — ACETAMINOPHEN 650 MG: 325 TABLET ORAL at 04:35

## 2024-09-04 RX ADMIN — AMIODARONE HYDROCHLORIDE 0.5 MG/MIN: 50 INJECTION, SOLUTION INTRAVENOUS at 14:24

## 2024-09-04 ASSESSMENT — PAIN SCALES - GENERAL
PAINLEVEL_OUTOF10: 3
PAINLEVEL_OUTOF10: 0

## 2024-09-04 ASSESSMENT — PAIN DESCRIPTION - ORIENTATION: ORIENTATION: LEFT

## 2024-09-04 ASSESSMENT — PAIN DESCRIPTION - LOCATION: LOCATION: HIP

## 2024-09-04 NOTE — PROGRESS NOTES
[facility]  [address] [place] [address] [phone]        Hospitalist Progress Note      NAME: Kenyatta Santana   :  1945  MRM:  [id number]    Date/Time: 2024  10:06 AM         Subjective:     Chief Complaint: Patient report no complaints at this time.  She remains in A-fib with RVR.  Asymptomatic.            Objective:       Vitals:          Last 24hrs VS reviewed since prior progress note. Most recent are:    Vitals:    24 0742   BP: 122/77   Pulse:    Resp: 18   Temp: 98.2 °F (36.8 °C)   SpO2: 98%     SpO2 Readings from Last 6 Encounters:   24 98%          Intake/Output Summary (Last 24 hours) at 2024 1006  Last data filed at 2024 0332  Gross per 24 hour   Intake 600.46 ml   Output 1000 ml   Net -399.54 ml          Exam:     Physical Exam:    Gen:  Well-developed, well-nourished, in no acute distress  HEENT:  Pink conjunctivae, PERRL, hearing intact to voice, moist mucous membranes  Neck:  Supple, without masses, thyroid non-tender  Resp:  No accessory muscle use, clear breath sounds without wheezes rales or rhonchi  Card: irregularly irregular , tachycardic  Abd:  Soft, non-tender, non-distended, normoactive bowel sounds are present  Musc:  No cyanosis or clubbing  Skin:  No rashes or ulcers, skin turgor is good  Neuro:  Cranial nerves 3-12 are grossly intact,  strength is 5/5 bilaterally and dorsi / plantarflexion is 5/5 bilaterally, follows commands appropriately  Psych:  Good insight, oriented to person, place and time, alert  L hip incision C/D/I     Medications Reviewed: (see below)    Lab Data Reviewed: (see below)    ______________________________________________________________________    Medications:     Current Facility-Administered Medications   Medication Dose Route Frequency    dilTIAZem (CARDIZEM CD) extended release capsule 300 mg  300 mg Oral Daily    amiodarone (CORDARONE) 450 mg in dextrose 5 % 250 mL infusion  Oral Daily PRN            Lab Review:     Recent Labs     09/04/24  0442   WBC 11.4*   HGB 11.6   HCT 36.4        Recent Labs     09/04/24  0442      K 4.3   *   CO2 27   BUN 18   ALT 62     No components found for: \"GLPOC\"         Assessment / Plan:   Paroxysmal atrial fibrillation with RVR  Secondary hypercoagulable state due to A-fib  Long-term anticoagulation with Eliquis  -EP consulted, she had normal stress test 2 months ago, echo 8/25/2024 showed EF 55%, abnormal diastolic function, mild TR. continue diltiazem, metoprolol and digoxin.  Continue Eliquis.  Cardiology following, received amnio bolus, currently on amnio drip, remains in A-fib with uncontrolled rate.  Cardiology planning for AMARJIT CV tomorrow, keep NPO after mn.    Left femoral neck fracture due to mechanical fall, POA  -s/p left hip hemiarthroplasty 8/26/2024  -continue tylenol   -PRN pain meds   -PT/OT   -SNF , can be discharged once medically ready    Leukocytosis, likely reactive d/t fracture, POA. Resolved  -monitor     HTN -BP stable, can resume home medication if BP is uncontrolled    PreDM - A1c 6.4  -monitor BG on BMP   -diabetic diet     CKD - ruled out   -monitor     Overweight (25.0 - 29.9): Body mass index is 25.06 kg/m².:    -weight loss     Total time spent with patient: 35 Minutes                  Care Plan discussed with: Patient, Family, and Nursing Staff    Discussed:  Care Plan    Prophylaxis:   eliquis    Disposition:  SNF/LTC; once medically ready.         ___________________________________________________    Attending Physician: Eliel Short MD

## 2024-09-04 NOTE — PLAN OF CARE
Problem: Physical Therapy - Adult  Goal: By Discharge: Performs mobility at highest level of function for planned discharge setting.  See evaluation for individualized goals.  Description: FUNCTIONAL STATUS PRIOR TO ADMISSION: Patient was independent without DME.    HOME SUPPORT PRIOR TO ADMISSION: The patient lived alone with daughter close by to provide assistance if needed.    Physical Therapy Goals  Initiated 8/27/2024, reviewed and cont x 7 more days  1.  Patient will move from supine to sit and sit to supine, scoot up and down, and roll side to side in bed with modified independence within 4 day(s).    2.  Patient will perform sit to stand with modified independence within 4 day(s).  3.  Patient will transfer from bed to chair and chair to bed with modified independence using the least restrictive device within 4 day(s).  4.  Patient will ambulate with modified independence for 150 feet with the least restrictive device within 4 day(s).   5.  Patient will perform LE home exercise program per protocol with modified independence within 4 days.    Outcome: Progressing     PHYSICAL THERAPY TREATMENT    Patient: Kenyatta Santana (78 y.o. female)  Date: 9/4/2024  Diagnosis:   Closed fracture of neck of left femur, initial encounter ([facility]) [S72.002A]  Closed left hip fracture, initial encounter ([facility]) [S72.002A]  Displaced fracture of left femoral neck ([facility]) [S72.002A] Closed left hip fracture, initial encounter ([facility])  Procedure(s) (LRB):  LEFT BIPOLAR HIP HEMIARTHROPLASTY (Left) 9 Days Post-Op  Precautions: Weight Bearing, Fall Risk   Left Lower Extremity Weight Bearing: Weight Bearing As Tolerated         Hip Precautions: Posterior hip precautions        ASSESSMENT:  Pt tolerated PT services well and continues to progress toward PT POC goals. Pt was received in bedside chair and demo high motivation for therapy services and functional return t/o session. Most tasks performed with cga including multiple functional sit  Patient;Family (daughter)  Education Provided: Role of Therapy;Plan of Care;Orientation;Family Education;Equipment;Precautions;Fall Prevention Strategies;Transfer Training;Energy Conservation  Education Method: Demonstration;Verbal;Teach Back  Barriers to Learning: None  Education Outcome: Verbalized understanding;Demonstrated understanding;Continued education needed      Mamie Grimes, PT, DPT

## 2024-09-04 NOTE — PLAN OF CARE
Patient  is up in chair. Stable and comfortable.  [place] Po intake. Denies pain.  Surgical site is . AMARJIT scheduled for tomorrow. NPO after midnight.      Problem: Safety - Adult  Goal: Free from fall injury  Outcome: Progressing     Problem: Pain  Goal: Verbalizes/displays adequate comfort level or baseline comfort level  Outcome: Progressing     Problem: Skin/Tissue Integrity  Goal: Absence of new skin breakdown  Description: 1.  Monitor for areas of redness and/or skin breakdown  2.  Assess vascular access sites hourly  3.  Every 4-6 hours minimum:  Change oxygen saturation probe site  4.  Every 4-6 hours:  If on nasal continuous positive airway pressure, respiratory therapy assess nares and determine need for appliance change or resting period.  Outcome: Progressing

## 2024-09-04 NOTE — PLAN OF CARE
Problem: Occupational Therapy - Adult  Goal: By Discharge: Performs self-care activities at highest level of function for planned discharge setting.  See evaluation for individualized goals.  Description: FUNCTIONAL STATUS PRIOR TO ADMISSION:  Patient is independent for self care and functional transfers/mobility.     HOME SUPPORT: Patient lived alone however with good support via daughter and niece that live close by. Pt stating daughter may be able to stay with pt upon discharge.     Occupational Therapy Goals:  Initiated 8/27/2024.  Reviewed 9/3/24 and all goals remain appropriate to continue for the next 7 days.  1.  Patient will perform grooming with Modified [place] within 7 day(s).  2.  Patient will perform upper body dressing with [place] within 7 day(s).  3.  Patient will perform lower body dressing with Modified [place] within 7 day(s).  4.  Patient will perform toilet transfers with Modified [place]  within 7 day(s).  5.  Patient will perform all aspects of toileting with Modified [place] within 7 day(s).  6.  Patient will participate in upper extremity therapeutic exercise/activities with [place] for 10 minutes within 7 day(s).    7.  Patient will utilize energy conservation techniques during functional activities with verbal cues within 7 day(s).    Outcome: Progressing   OCCUPATIONAL THERAPY TREATMENT  Patient: Kenyatta Santana (78 y.o. female)  Date: 9/4/2024  Primary Diagnosis: Closed fracture of neck of left femur, initial encounter ([facility]) [S72.002A]  Closed left hip fracture, initial encounter ([facility]) [S72.002A]  Displaced fracture of left femoral neck ([facility]) [S72.002A]  Procedure(s) (LRB):  LEFT BIPOLAR HIP HEMIARTHROPLASTY (Left) 9 Days Post-Op   Precautions: Weight Bearing, Fall Risk   Left Lower Extremity Weight Bearing: Weight Bearing As Tolerated         Hip Precautions: Posterior hip precautions  Chart, occupational therapy assessment, plan of care, and goals were  training;Manual cues;Demonstration  Sit to Stand: Contact-guard assistance  Stand to Sit: Contact-guard assistance  Bed to Chair: Contact-guard assistance  Toilet Transfer: Contact-guard assistance           Balance:     Balance  Sitting: Intact  Sitting - Static: Good (unsupported)  Sitting - Dynamic:  (good minus/fair plus)  Standing: Impaired  Standing - Static: Fair  Standing - Dynamic: Fair      ADL Intervention:  Grooming: Contact guard assistance   Grooming Skilled Clinical Factors: standing at the sink; independent from seated position    Toileting: Contact guard assistance         Activity Tolerance:   Good  Please refer to the flowsheet for vital signs taken during this treatment.    After treatment:   Patient left in no apparent distress sitting up in chair, Call bell within reach, and Bed/ chair alarm activated    COMMUNICATION/EDUCATION:   The patient's plan of care was discussed with: physical therapist, registered nurse, and patient.         Thank you for this referral.  Kenyatta Mccartney, OTR/L  Minutes: 39

## 2024-09-04 NOTE — PROGRESS NOTES
[facility] CARDIOLOGY  Cardiology Note     []Initial Encounter     [x]Follow-up    Patient Name: Kneyatta Santana - :1945 - MRN:[id number]  Primary Cardiologist: WILLIAM ([facility]-Card[facility]-EP)  Rounding cardiologist:  Dr. lyman     Reason for consult:  Preop evaluation.  24: Reconsulted for afib with RVR    Interval HPI:  This a.m.  pt underwent left bipolar hip hemiarthroplasty.  This afternoon, pt went into afib with RVR with HR as high as 180's-200 per documentation. EKG showed afib with RVR. RRT was called. She was given a total of 3 doses of IV lopressor and she was transferred to [facility]. Upon arrival to [facility], she was noted to be in NSR with HR in the 70's.  Pt reports during RVR episode, she had no chest pain, SOB, dizziness, palpitations or lightheadedness.     SUBJECTIVE:  Feels well, still w/ episodes of elevated HR.      Assessment and Plan       Paroxysmal atrial fibrillation with RVR  - has remained in a-flutter (previously paroxsymal) despite medication adjustments   - She had stress test at [facility] 2 months ago which reportedly showed no ischemia.  - Echo 24 shows EF 55%, abnormal diastolic function. Mild TR, trace MR.    - on metoprolol, dilt. Dc'd dig  - added IV amio, remains in a-flutter w/ episodes of elevated HR  - con Eliquis   - needs AMARJIT/DCCV, unable to perform today. Scheduled for tomorrow, NPO after midnight      2. Left femoral neck fracture secondary to mechanical fall  -s/p left hip hemiarthroplasty    3.Hypertension  - BP softer, d/c norvasc and hold lisinopril to allow for further rate control    4. Pre diabetes  - A1c 6.4   - Management per hospitalist team    Follow up with [facility] on discharge        CARDIOLOGY ATTENDING  Patient personally seen and examined. All the elements of history and examination were personally performed. Assessment and plan was discussed and agree.       Feels OK in bed    NAD, Irreg Irreg, no murmur, lungs clear, abd soft, no sig  mass,  or overt pancreatic head mass.  4. Large gallstone in the neck. Multiple moderate stones. No acute  cholecystitis.    23X      No results for input(s): \"CPK\" in the last 72 hours.    Invalid input(s): \"CKQMB\", \"CPKMB\", \"TROIQ\", \"BMPP\"  Recent Labs     24  0956 24  0442    139   K 4.2 4.3   * 110*   CO2 26 27   BUN 20 18     Recent Labs     24  0956 24  0442   WBC 10.8 11.4*   HGB 12.6 11.6   HCT 39.0 36.4    301     No results for input(s): \"INR\" in the last 72 hours.    Invalid input(s): \"PTTP\", \"PTP\", \"GPT\", \"SGOT\", \"AP\"  No results for input(s): \"CHOL\", \"HDLC\", \"HBA1C\" in the last 72 hours.    Invalid input(s): \"TGL\", \"LDLC\"  No results for input(s): \"ESR\", \"CRP\", \"TSH\" in the last 72 hours.          Current meds:    Current Facility-Administered Medications:     dilTIAZem (CARDIZEM CD) extended release capsule 300 mg, 300 mg, Oral, Daily, Manuel Julian MD    [COMPLETED] amiodarone (CORDARONE) 150 mg in dextrose 5 % 100 mL bolus, 150 mg, IntraVENous, Once, Stopped at 24 1129 **FOLLOWED BY** [] amiodarone (CORDARONE) 450 mg in dextrose 5 % 250 mL infusion ([id number]), 1 mg/min, IntraVENous, Continuous, Stopped at 24 1610 **FOLLOWED BY** amiodarone (CORDARONE) 450 mg in dextrose 5 % 250 mL infusion ([id number]), 0.5 mg/min, IntraVENous, Continuous, Lisandra Henry APRN - NP, Last Rate: 16.7 mL/hr at 24 2249, 0.5 mg/min at 24 2249    metoprolol tartrate (LOPRESSOR) tablet 25 mg, 25 mg, Oral, BID, Eliel Short MD, 25 mg at 24    apixaban (ELIQUIS) tablet 5 mg, 5 mg, Oral, BID, Lisandra Henry APRN - NP, 5 mg at 24    metoprolol (LOPRESSOR) injection 2.5 mg, 2.5 mg, IntraVENous, Q4H PRN, Amalia Walden, NIKIA - NP, 2.5 mg at 24 1004    sodium chloride 0.9 % bolus 500 mL, 500 mL, IntraVENous, Once, Nathan Sapp, JOANIE    sodium chloride flush 0.9 % injection 5-40 mL, 5-40 mL, IntraVENous, 2 times per day,

## 2024-09-05 ENCOUNTER — APPOINTMENT (OUTPATIENT)
Facility: HOSPITAL | Age: 79
End: 2024-09-05
Payer: MEDICARE

## 2024-09-05 LAB
ECHO BSA: 1.85 M2
SARS-COV-2 RNA RESP QL NAA+PROBE: DETECTED
SOURCE: ABNORMAL

## 2024-09-05 PROCEDURE — 97110 THERAPEUTIC EXERCISES: CPT

## 2024-09-05 PROCEDURE — 6370000000 HC RX 637 (ALT 250 FOR IP): Performed by: SPECIALIST

## 2024-09-05 PROCEDURE — 93312 ECHO TRANSESOPHAGEAL: CPT | Performed by: SPECIALIST

## 2024-09-05 PROCEDURE — 97535 SELF CARE MNGMENT TRAINING: CPT

## 2024-09-05 PROCEDURE — 93325 DOPPLER ECHO COLOR FLOW MAPG: CPT | Performed by: SPECIALIST

## 2024-09-05 PROCEDURE — 99152 MOD SED SAME PHYS/QHP 5/>YRS: CPT

## 2024-09-05 PROCEDURE — 94761 N-INVAS EAR/PLS OXIMETRY MLT: CPT

## 2024-09-05 PROCEDURE — 97116 GAIT TRAINING THERAPY: CPT

## 2024-09-05 PROCEDURE — 6360000002 HC RX W HCPCS: Performed by: NURSE PRACTITIONER

## 2024-09-05 PROCEDURE — 93312 ECHO TRANSESOPHAGEAL: CPT

## 2024-09-05 PROCEDURE — 2580000003 HC RX 258: Performed by: PHYSICIAN ASSISTANT

## 2024-09-05 PROCEDURE — 99232 SBSQ HOSP IP/OBS MODERATE 35: CPT | Performed by: SPECIALIST

## 2024-09-05 PROCEDURE — NBSRV NON-BILLABLE SERVICE: Performed by: SPECIALIST

## 2024-09-05 PROCEDURE — APPSS30 APP SPLIT SHARED TIME 16-30 MINUTES: Performed by: NURSE PRACTITIONER

## 2024-09-05 PROCEDURE — 6370000000 HC RX 637 (ALT 250 FOR IP): Performed by: STUDENT IN AN ORGANIZED HEALTH CARE EDUCATION/TRAINING PROGRAM

## 2024-09-05 PROCEDURE — 99152 MOD SED SAME PHYS/QHP 5/>YRS: CPT | Performed by: SPECIALIST

## 2024-09-05 PROCEDURE — 2580000003 HC RX 258: Performed by: NURSE PRACTITIONER

## 2024-09-05 PROCEDURE — 92960 CARDIOVERSION ELECTRIC EXT: CPT | Performed by: SPECIALIST

## 2024-09-05 PROCEDURE — 6370000000 HC RX 637 (ALT 250 FOR IP): Performed by: PHYSICIAN ASSISTANT

## 2024-09-05 PROCEDURE — 6370000000 HC RX 637 (ALT 250 FOR IP): Performed by: NURSE PRACTITIONER

## 2024-09-05 PROCEDURE — 6360000002 HC RX W HCPCS: Performed by: SPECIALIST

## 2024-09-05 PROCEDURE — 87635 SARS-COV-2 COVID-19 AMP PRB: CPT

## 2024-09-05 PROCEDURE — 5A2204Z RESTORATION OF CARDIAC RHYTHM, SINGLE: ICD-10-PCS | Performed by: SPECIALIST

## 2024-09-05 PROCEDURE — 2060000000 HC ICU INTERMEDIATE R&B

## 2024-09-05 RX ORDER — MIDAZOLAM HYDROCHLORIDE 1 MG/ML
INJECTION INTRAMUSCULAR; INTRAVENOUS
Status: DISPENSED
Start: 2024-09-05 | End: 2024-09-05

## 2024-09-05 RX ORDER — LIDOCAINE 50 MG/G
OINTMENT TOPICAL
Status: DISPENSED
Start: 2024-09-05 | End: 2024-09-05

## 2024-09-05 RX ORDER — FENTANYL CITRATE 50 UG/ML
INJECTION, SOLUTION INTRAMUSCULAR; INTRAVENOUS
Status: DISPENSED
Start: 2024-09-05 | End: 2024-09-05

## 2024-09-05 RX ORDER — LIDOCAINE 50 MG/G
OINTMENT TOPICAL PRN
Status: COMPLETED | OUTPATIENT
Start: 2024-09-05 | End: 2024-09-05

## 2024-09-05 RX ORDER — LIDOCAINE HYDROCHLORIDE 20 MG/ML
JELLY TOPICAL PRN
Status: COMPLETED | OUTPATIENT
Start: 2024-09-05 | End: 2024-09-05

## 2024-09-05 RX ORDER — LIDOCAINE HYDROCHLORIDE 20 MG/ML
SOLUTION OROPHARYNGEAL
Status: DISPENSED
Start: 2024-09-05 | End: 2024-09-05

## 2024-09-05 RX ORDER — FENTANYL CITRATE 50 UG/ML
INJECTION, SOLUTION INTRAMUSCULAR; INTRAVENOUS PRN
Status: COMPLETED | OUTPATIENT
Start: 2024-09-05 | End: 2024-09-05

## 2024-09-05 RX ORDER — LIDOCAINE HYDROCHLORIDE 20 MG/ML
SOLUTION OROPHARYNGEAL PRN
Status: COMPLETED | OUTPATIENT
Start: 2024-09-05 | End: 2024-09-05

## 2024-09-05 RX ORDER — AMIODARONE HYDROCHLORIDE 200 MG/1
400 TABLET ORAL 2 TIMES DAILY
Status: DISCONTINUED | OUTPATIENT
Start: 2024-09-05 | End: 2024-09-06

## 2024-09-05 RX ORDER — MIDAZOLAM HYDROCHLORIDE 1 MG/ML
INJECTION INTRAMUSCULAR; INTRAVENOUS PRN
Status: COMPLETED | OUTPATIENT
Start: 2024-09-05 | End: 2024-09-05

## 2024-09-05 RX ORDER — LIDOCAINE HYDROCHLORIDE 20 MG/ML
JELLY TOPICAL
Status: DISPENSED
Start: 2024-09-05 | End: 2024-09-05

## 2024-09-05 RX ADMIN — ACETAMINOPHEN 650 MG: 325 TABLET ORAL at 05:31

## 2024-09-05 RX ADMIN — FAMOTIDINE 20 MG: 20 TABLET, FILM COATED ORAL at 10:17

## 2024-09-05 RX ADMIN — LIDOCAINE HYDROCHLORIDE 15 ML: 20 SOLUTION ORAL at 08:14

## 2024-09-05 RX ADMIN — FENTANYL CITRATE 25 MCG: 50 INJECTION, SOLUTION INTRAMUSCULAR; INTRAVENOUS at 08:33

## 2024-09-05 RX ADMIN — LIDOCAINE 5 ML: 50 OINTMENT TOPICAL at 08:21

## 2024-09-05 RX ADMIN — AMIODARONE HYDROCHLORIDE 400 MG: 200 TABLET ORAL at 20:42

## 2024-09-05 RX ADMIN — MIDAZOLAM HYDROCHLORIDE 1 MG: 1 INJECTION, SOLUTION INTRAMUSCULAR; INTRAVENOUS at 08:35

## 2024-09-05 RX ADMIN — METOPROLOL TARTRATE 25 MG: 25 TABLET, FILM COATED ORAL at 10:15

## 2024-09-05 RX ADMIN — MIDAZOLAM HYDROCHLORIDE 1 MG: 1 INJECTION, SOLUTION INTRAMUSCULAR; INTRAVENOUS at 08:44

## 2024-09-05 RX ADMIN — APIXABAN 5 MG: 5 TABLET, FILM COATED ORAL at 20:42

## 2024-09-05 RX ADMIN — MIDAZOLAM HYDROCHLORIDE 1 MG: 1 INJECTION, SOLUTION INTRAMUSCULAR; INTRAVENOUS at 08:40

## 2024-09-05 RX ADMIN — DILTIAZEM HYDROCHLORIDE 300 MG: 300 CAPSULE, EXTENDED RELEASE ORAL at 10:16

## 2024-09-05 RX ADMIN — AMIODARONE HYDROCHLORIDE 400 MG: 200 TABLET ORAL at 10:23

## 2024-09-05 RX ADMIN — ACETAMINOPHEN 650 MG: 325 TABLET ORAL at 10:16

## 2024-09-05 RX ADMIN — AMIODARONE HYDROCHLORIDE 0.5 MG/MIN: 50 INJECTION, SOLUTION INTRAVENOUS at 05:34

## 2024-09-05 RX ADMIN — MIDAZOLAM HYDROCHLORIDE 1 MG: 1 INJECTION, SOLUTION INTRAMUSCULAR; INTRAVENOUS at 08:33

## 2024-09-05 RX ADMIN — MIDAZOLAM HYDROCHLORIDE 1 MG: 1 INJECTION, SOLUTION INTRAMUSCULAR; INTRAVENOUS at 08:38

## 2024-09-05 RX ADMIN — SODIUM CHLORIDE, PRESERVATIVE FREE 10 ML: 5 INJECTION INTRAVENOUS at 20:43

## 2024-09-05 RX ADMIN — SODIUM CHLORIDE, PRESERVATIVE FREE 10 ML: 5 INJECTION INTRAVENOUS at 10:17

## 2024-09-05 RX ADMIN — SODIUM CHLORIDE, PRESERVATIVE FREE 10 ML: 5 INJECTION INTRAVENOUS at 10:18

## 2024-09-05 RX ADMIN — LIDOCAINE HYDROCHLORIDE 5 ML: 20 JELLY TOPICAL at 08:38

## 2024-09-05 RX ADMIN — FENTANYL CITRATE 25 MCG: 50 INJECTION, SOLUTION INTRAMUSCULAR; INTRAVENOUS at 08:44

## 2024-09-05 RX ADMIN — APIXABAN 5 MG: 5 TABLET, FILM COATED ORAL at 10:16

## 2024-09-05 ASSESSMENT — PAIN SCALES - GENERAL: PAINLEVEL_OUTOF10: 3

## 2024-09-05 NOTE — PLAN OF CARE
Problem: Occupational Therapy - Adult  Goal: By Discharge: Performs self-care activities at highest level of function for planned discharge setting.  See evaluation for individualized goals.  Description: FUNCTIONAL STATUS PRIOR TO ADMISSION:  Patient is independent for self care and functional transfers/mobility.     HOME SUPPORT: Patient lived alone however with good support via daughter and niece that live close by. Pt stating daughter may be able to stay with pt upon discharge.     Occupational Therapy Goals:  Initiated 8/27/2024.  Reviewed 9/3/24 and all goals remain appropriate to continue for the next 7 days.  1.  Patient will perform grooming with Modified [place] within 7 day(s).  2.  Patient will perform upper body dressing with [place] within 7 day(s).  3.  Patient will perform lower body dressing with Modified [place] within 7 day(s).  4.  Patient will perform toilet transfers with Modified [place]  within 7 day(s).  5.  Patient will perform all aspects of toileting with Modified [place] within 7 day(s).  6.  Patient will participate in upper extremity therapeutic exercise/activities with [place] for 10 minutes within 7 day(s).    7.  Patient will utilize energy conservation techniques during functional activities with verbal cues within 7 day(s).    9/5/2024 1417 by Delores Vann, AKIRA  Outcome: Progressing     Problem: Physical Therapy - Adult  Goal: By Discharge: Performs mobility at highest level of function for planned discharge setting.  See evaluation for individualized goals.  Description: FUNCTIONAL STATUS PRIOR TO ADMISSION: Patient was independent without DME.    HOME SUPPORT PRIOR TO ADMISSION: The patient lived alone with daughter close by to provide assistance if needed.    Physical Therapy Goals  Initiated 8/27/2024, reviewed and cont x 7 more days  1.  Patient will move from supine to sit and sit to supine, scoot up and down, and roll side to side in bed  discharge with patient and caregiver   Arrange for needed discharge resources and transportation as appropriate   Identify discharge learning needs (meds, wound care, etc)   Pt agreed to work on care plan.

## 2024-09-05 NOTE — CARE COORDINATION
follow up:    1515 update:  patient is COVID+, message sent to [facility] via [employer].    Update:  Bed available at [facility] tomorrow (9/6/24).  Rapid COVID testing sent this pm, result pending.     Patient accepted by [facility] for SNF care.  Updated clinicals attached to [facility] referral.  AMARJIT/cardioversion completed this am.  Rapid COVID ordered (for [facility]).  Await response from [facility].    Pia Melo RN, MSN/Care manager

## 2024-09-05 NOTE — PROGRESS NOTES
TRANSFER - IN REPORT:    Verbal report received from chart review(name) on Kenyatta Santana  being received from IMCU(unit) for ordered procedure      Report consisted of patient’s Situation, Background, Assessment and   Recommendations(SBAR).     Information from the following report(s) Adult Overview, MAR, Recent Results, Pre Procedure Checklist, and Procedure Verification was reviewed with the receiving nurse.    Opportunity for questions and clarification was provided.      Assessment completed upon patient’s arrival to unit and care assume

## 2024-09-05 NOTE — PLAN OF CARE
received in bedside chair)  Supine to Sit: Stand-by assistance;Assist X1;Additional time  Scooting: Contact-guard assistance;Additional time;Stand-by assistance    Transfers:  Transfer Training  Transfer Training: Yes  Overall Level of Assistance: Contact-guard assistance  Interventions: Demonstration;Manual cues;Safety awareness training;Tactile cues;Verbal cues  Sit to Stand: Contact-guard assistance  Stand to Sit: Contact-guard assistance  Bed to Chair: Contact-guard assistance  Toilet Transfer: Contact-guard assistance    Balance:  Balance  Sitting: Intact  Sitting - Static: Good (unsupported)  Sitting - Dynamic:  (good minus)  Standing: Impaired  Standing - Static:  (good minus)  Standing - Dynamic:  (fair plus/good minus)     Ambulation/Gait Training:     Gait  Gait Training: Yes  Overall Level of Assistance: Contact-guard assistance  Distance (ft): 190 Feet  Assistive Device: Gait belt;Walker, rolling  Interventions: Safety awareness training;Demonstration;Manual cues;Tactile cues;Verbal cues (occ steadying assist, tactile/vcs energy efficiency, safety)  Gait Abnormalities:  (slow reciprocal gait, decreased step/stride, musa, safety, foot clearnce)  Number of Stairs Trained: 0    Neuro Re-Education:                                                                                                                                                                                                                                                 [facility] AM-PAC®      Basic Mobility Inpatient Short Form (6-Clicks) Version 2  How much HELP from another person do you currently need... (If the patient hasn't done an activity recently, how much help from another person do you think they would need if they tried?) Total A Lot A Little None   1.  Turning from your back to your side while in a flat bed without using bedrails? []  1 []  2 []  3  [x]  4   2.  Moving from lying on your back to sitting on the side of a  in no apparent distress sitting up in chair, Call bell within reach, and Bed/ chair alarm activated      COMMUNICATION/EDUCATION:   The patient's plan of care was discussed with: occupational therapist and registered nurse    Patient Education  Education Given To: Patient  Education Provided: Role of Therapy;Transfer Training;Equipment;Plan of Care;Energy Conservation;Fall Prevention Strategies;Orientation;Precautions  Education Method: Demonstration;Verbal;Teach Back  Barriers to Learning: None  Education Outcome: Verbalized understanding;Continued education needed;Demonstrated understanding      Mamie Grimes, PT, DPT

## 2024-09-05 NOTE — PROCEDURES
Manuel Julian MD. [facility]              Patient: Kenyatta Santana  : 1945      Today's Date: 2024      AMARJIT and CARDIOVERSION PROCEDURE NOTE    Date of Procedure: 2024   Preoperative Diagnosis: Atrial Fibrillation   Postoperative Diagnosis: No ZINA clot, Successful cardioversion to NSR  Procedure: AMARJIT and Direct Current Cardioversion (DCC)  Cardiologist: Manuel Julian MD  Anesthesia: IV sedation (versed, fentanyl)  Estimated Blood Loss: None  Specimens Removed: None  Technique/Conclusion:   AMARJIT ruled out ZINA clot.  Patient was further sedated and then delivered 300 J of biphasic energy in the AP position.  Patient was cardioverted to NSR on a single attempt.  There were no complications.  Successful cardioversion from Afib to NSR.      Complications: none

## 2024-09-05 NOTE — PROGRESS NOTES
Hospitalist Progress Note      NAME:  Kenyatta Santana   :  1945  MRM:  [id number]    Date/Time: 2024  6:07 PM           Assessment / Plan:     Paroxysmal atrial fibrillation with RVR  Secondary hypercoagulable state due to A-fib  Long-term anticoagulation with Eliquis  - Normal stress test 2 months ago. Echo 2024 showed EF 55%, abnormal diastolic function, mild TR.   - Card/EP consulted. On dilt and metoprolol. Trial of digoxin  - 9/3 with persistent RVR > discontinued. Trial of Amio 9/3 with persistent a fib with RVR > Plans for AMARJIT/CV   - Cont eliquis     Covid +   - Incidental finding during testing for SNF placement   - Monitor O2 status and symptoms      Left femoral neck fracture due to mechanical fall, POA  -s/p left hip hemiarthroplasty 2024  -continue tylenol   -PRN pain meds   -PT/OT   -SNF , can be discharged once medically ready     Leukocytosis, likely reactive d/t fracture, POA. Resolved  -monitor      HTN -BP stable, can resume home medication if BP is uncontrolled     PreDM - A1c 6.4  -monitor BG on BMP   -diabetic diet          #BMI (Calculated): 25.05    I have personally reviewed the radiographs, laboratory data in Epic and decisions and statements above are based partially on this personal interpretation.                 Care Plan discussed with: Patient, Care Manager, Nursing Staff, and Consultant/Specialist    Discussed:  Care Plan and D/C Planning    Prophylaxis:   Eliquis     Disposition:  SNF/LTC    Total time spent with patient care: 30 Minutes **I personally saw and examined the patient during this time period**           ___________________________________________________    Attending Physician: Miguel aldana MD        Subjective:     Chief Complaint:  Feeling better     ROS:  12+ ROS reviewed with patient and negative with exception of above           Objective:       Vitals:          Last 24hrs VS reviewed since prior progress note. Most recent

## 2024-09-05 NOTE — PROGRESS NOTES
Physical therapy services attempted 10:09AM. Pt received resting comfortably and eating s/p cardioverson and ?pending transfer to [facility]?. Reporting DPT reintroduced self. Dietary then RN Teams arrived to provide care. PT Team will try to provide skilled services at a later date as time permits and as medically appropriate to patient tolerance.    Mamie Grimes, PT, DPT

## 2024-09-05 NOTE — PROGRESS NOTES
Nutrition Assessment     Type and Reason for Visit: Reassess    Nutrition Recommendations/Plan:   Continue 4 Carb Choice diet     Obtain measured weight - Standing weight preferred if patient is safe and able. If using bed scale, be sure bed has been calibrated.       Malnutrition Assessment:  Malnutrition Status: No malnutrition    Nutrition Assessment:    78 year old female admitted for Closed fracture of neck of left femur, initial encounter ([facility]) [S72.002A]  Closed left hip fracture, initial encounter ([facility]) [S72.002A]  Displaced fracture of left femoral neck ([facility]) [S72.002A] who  has a past medical history of Atrial fibrillation ([facility]), Hypertension, Left displaced femoral neck fracture ([facility]), Non-smoker, and Prediabetes.  Pt is seen today for LOS. She has plans for rehab at discharge but awaiting cardiac clearance. Pt reports her appetite is good now. She mentions she had some trouble when her  passed away, but she is doing better now. Her daughter is at bedside and confirms good appetite and stable weight. Admit weight is stated. RD completed nutrition focused physical exam without significant findings beyond normal age-related conditions at this time. BG has been mildly elevated and is age appropriate. Patient would like to continue diet controlled measures in place. She declines offer of protein supplements since she is eating well at this time. Pt is having some loose stools today as a result of bowel regimen, which has been since been changed. Usually normal daily bowel habits. No other GI concerns at this time.      9/5 Follow Up:  Pt was eating breakfast when RD visited. The meal had been saved for her to have after procedure this morning. She is feeling good and has been eating well. New measured weight has not been obtained and currently pt has extra blankets and tray across bed, RD is unable to obtain a bed scale measure at this time. No nutrition concerns noted. +BM today. Awaiting placement

## 2024-09-05 NOTE — PROGRESS NOTES
[facility] CARDIOLOGY  Cardiology Note     []Initial Encounter     [x]Follow-up    Patient Name: Kenyatta Santana - :1945 - MRN:[id number]  Primary Cardiologist: WILLIAM ([facility]Card[facility]-EP)  Rounding cardiologist:  Dr. lyman     Reason for consult:  Preop evaluation.  24: Reconsulted for afib with RVR    Interval HPI:  This a.m.  pt underwent left bipolar hip hemiarthroplasty.  This afternoon, pt went into afib with RVR with HR as high as 180's-200 per documentation. EKG showed afib with RVR. RRT was called. She was given a total of 3 doses of IV lopressor and she was transferred to [facility]. Upon arrival to [facility], she was noted to be in NSR with HR in the 70's.  Pt reports during RVR episode, she had no chest pain, SOB, dizziness, palpitations or lightheadedness.     SUBJECTIVE:    Remains in a-flutter, going down to AMARJIT/DCCV this morning.      Assessment and Plan       Paroxysmal atrial fib/a-flutter with RVR  - has remained in a-flutter (previously paroxsymal) despite medication adjustments   - She had stress test at [facility] 2 months ago which reportedly showed no ischemia.  - Echo 24 shows EF 55%, abnormal diastolic function. Mild TR, trace MR.    - on metoprolol, dilt. Dc'd dig  - added IV amio, remains in a-flutter w/ episodes of elevated HR  - con Eliquis   - needs AMARJIT/DCCV, scheduled this morning      2. Left femoral neck fracture secondary to mechanical fall  -s/p left hip hemiarthroplasty    3.Hypertension  - BP softer, d/c norvasc and hold lisinopril to allow for further rate control    4. Pre diabetes  - A1c 6.4   - Management per hospitalist team    Follow up with S on discharge        CARDIOLOGY ATTENDING  Patient personally seen and examined. All the elements of history and examination were personally performed. Assessment and plan was discussed and agree.       Feels OK in bed    NAD, Irreg Irreg, no murmur, lungs clear, abd soft, no sig edema  Tele - Afib      1) Paroxysmal  visualized.    Interatrial Septum: Interatrial septum was not well visualized.    Pericardium: Not assessed due to poor image quality. There is evidence of epicardial fat. Trivial pericardial effusion present. No indication of cardiac tamponade.    Image quality is poor. Procedure performed with the patient in a supine position.    Signed by: Sung Adler MD on 8/26/2024  8:55 AM    Most recent HS troponins:  No results for input(s): \"TROPHS\", \"CKMB\" in the last 72 hours.    ECG: sinus rhythm, rate of 97 bpm with PACs, no ischemic changes    Review of Systems    [x]All other systems reviewed and all negative except as written in HPI    [] Patient unable to provide secondary to condition         Past Medical History:   Diagnosis Date    Atrial fibrillation (HCC)     [facility]    Hypertension     Left displaced femoral neck fracture (HCC) 08/25/2024    Non-smoker     Prediabetes 08/25/2024    A1c 6.4     Past Surgical History:   Procedure Laterality Date    ELBOW SURGERY Left 2004    HIP SURGERY Left 8/26/2024    LEFT BIPOLAR HIP HEMIARTHROPLASTY performed by Primitivo Pena MD at [facility] AMBULATORY OR     Social Hx:  reports that she has never smoked. She has never used smokeless tobacco. She reports that she does not drink alcohol and does not use drugs.  Family Hx: family history is not on file.  No Known Allergies       OBJECTIVE:  Wt Readings from Last 3 Encounters:   09/05/24 72.6 kg (160 lb)       Intake/Output Summary (Last 24 hours) at 9/5/2024 0853  Last data filed at 9/5/2024 0543  Gross per 24 hour   Intake --   Output 1000 ml   Net -1000 ml         Physical Exam    Vitals:   Vitals:    09/05/24 0836 09/05/24 0839 09/05/24 0842 09/05/24 0845   BP: (!) 146/83 (!) 131/93 (!) 159/119 (!) 157/142   Pulse: (!) 119 (!) 124 (!) 133 (!) 114   Resp: 19 17 20 23   Temp:       TempSrc:       SpO2: 97% 99% 98% 97%   Weight:       Height:           PHYSICAL EXAM  General:  Alert and oriented, in no

## 2024-09-05 NOTE — PLAN OF CARE
Problem: Occupational Therapy - Adult  Goal: By Discharge: Performs self-care activities at highest level of function for planned discharge setting.  See evaluation for individualized goals.  Description: FUNCTIONAL STATUS PRIOR TO ADMISSION:  Patient is independent for self care and functional transfers/mobility.     HOME SUPPORT: Patient lived alone however with good support via daughter and niece that live close by. Pt stating daughter may be able to stay with pt upon discharge.     Occupational Therapy Goals:  Initiated 8/27/2024.  Reviewed 9/3/24 and all goals remain appropriate to continue for the next 7 days.  1.  Patient will perform grooming with Modified Walden within 7 day(s).  2.  Patient will perform upper body dressing with Walden within 7 day(s).  3.  Patient will perform lower body dressing with Modified Walden within 7 day(s).  4.  Patient will perform toilet transfers with Modified Walden  within 7 day(s).  5.  Patient will perform all aspects of toileting with Modified Walden within 7 day(s).  6.  Patient will participate in upper extremity therapeutic exercise/activities with Walden for 10 minutes within 7 day(s).    7.  Patient will utilize energy conservation techniques during functional activities with verbal cues within 7 day(s).    Outcome: Progressing   OCCUPATIONAL THERAPY TREATMENT  Patient: Kenyatta Santana (78 y.o. female)  Date: 9/5/2024  Primary Diagnosis: Closed fracture of neck of left femur, initial encounter ([facility]) [S72.002A]  Closed left hip fracture, initial encounter ([facility]) [S72.002A]  Displaced fracture of left femoral neck ([facility]) [S72.002A]  Procedure(s) (LRB):  LEFT BIPOLAR HIP HEMIARTHROPLASTY (Left) 10 Days Post-Op   Precautions: Weight Bearing, Fall Risk   Left Lower Extremity Weight Bearing: Weight Bearing As Tolerated         Hip Precautions: Posterior hip precautions  Chart, occupational therapy assessment, plan of care, and goals were

## 2024-09-06 VITALS
RESPIRATION RATE: 16 BRPM | BODY MASS INDEX: 25.11 KG/M2 | OXYGEN SATURATION: 100 % | TEMPERATURE: 99.7 F | SYSTOLIC BLOOD PRESSURE: 149 MMHG | WEIGHT: 160 LBS | HEART RATE: 77 BPM | HEIGHT: 67 IN | DIASTOLIC BLOOD PRESSURE: 80 MMHG

## 2024-09-06 PROCEDURE — 99232 SBSQ HOSP IP/OBS MODERATE 35: CPT | Performed by: SPECIALIST

## 2024-09-06 PROCEDURE — 6370000000 HC RX 637 (ALT 250 FOR IP): Performed by: PHYSICIAN ASSISTANT

## 2024-09-06 PROCEDURE — 6370000000 HC RX 637 (ALT 250 FOR IP): Performed by: NURSE PRACTITIONER

## 2024-09-06 PROCEDURE — 94761 N-INVAS EAR/PLS OXIMETRY MLT: CPT

## 2024-09-06 PROCEDURE — 2580000003 HC RX 258: Performed by: PHYSICIAN ASSISTANT

## 2024-09-06 PROCEDURE — APPSS30 APP SPLIT SHARED TIME 16-30 MINUTES: Performed by: NURSE PRACTITIONER

## 2024-09-06 PROCEDURE — 6370000000 HC RX 637 (ALT 250 FOR IP): Performed by: SPECIALIST

## 2024-09-06 RX ORDER — AMIODARONE HYDROCHLORIDE 200 MG/1
200 TABLET ORAL DAILY
Status: DISCONTINUED | OUTPATIENT
Start: 2024-09-21 | End: 2024-09-06 | Stop reason: HOSPADM

## 2024-09-06 RX ORDER — AMIODARONE HYDROCHLORIDE 200 MG/1
200 TABLET ORAL 2 TIMES DAILY
Status: DISCONTINUED | OUTPATIENT
Start: 2024-09-06 | End: 2024-09-06 | Stop reason: HOSPADM

## 2024-09-06 RX ORDER — DILTIAZEM HYDROCHLORIDE 300 MG/1
300 CAPSULE, COATED, EXTENDED RELEASE ORAL DAILY
Qty: 30 CAPSULE | Refills: 0 | Status: SHIPPED
Start: 2024-09-07

## 2024-09-06 RX ORDER — AMIODARONE HYDROCHLORIDE 200 MG/1
200 TABLET ORAL DAILY
Qty: 30 TABLET | Refills: 0 | Status: SHIPPED
Start: 2024-09-21

## 2024-09-06 RX ORDER — AMIODARONE HYDROCHLORIDE 200 MG/1
200 TABLET ORAL 2 TIMES DAILY
Qty: 28 TABLET | Refills: 0 | Status: SHIPPED
Start: 2024-09-06 | End: 2024-09-20

## 2024-09-06 RX ADMIN — ACETAMINOPHEN 650 MG: 325 TABLET ORAL at 06:34

## 2024-09-06 RX ADMIN — DILTIAZEM HYDROCHLORIDE 300 MG: 300 CAPSULE, EXTENDED RELEASE ORAL at 09:59

## 2024-09-06 RX ADMIN — FAMOTIDINE 20 MG: 20 TABLET, FILM COATED ORAL at 09:59

## 2024-09-06 RX ADMIN — ACETAMINOPHEN 650 MG: 325 TABLET ORAL at 09:59

## 2024-09-06 RX ADMIN — SODIUM CHLORIDE, PRESERVATIVE FREE 10 ML: 5 INJECTION INTRAVENOUS at 10:00

## 2024-09-06 RX ADMIN — APIXABAN 5 MG: 5 TABLET, FILM COATED ORAL at 10:03

## 2024-09-06 RX ADMIN — ACETAMINOPHEN 650 MG: 325 TABLET ORAL at 00:10

## 2024-09-06 ASSESSMENT — PAIN SCALES - GENERAL
PAINLEVEL_OUTOF10: 3
PAINLEVEL_OUTOF10: 2

## 2024-09-06 NOTE — PLAN OF CARE
Problem: Safety - Adult  Goal: Free from fall injury  Outcome: Adequate for Discharge     Problem: Pain  Goal: Verbalizes/displays adequate comfort level or baseline comfort level  Outcome: Adequate for Discharge  Flowsheets (Taken 9/6/2024 0800)  Verbalizes/displays adequate comfort level or baseline comfort level:   Encourage patient to monitor pain and request assistance   Assess pain using appropriate pain scale   Administer analgesics based on type and severity of pain and evaluate response     Problem: Skin/Tissue Integrity  Goal: Absence of new skin breakdown  Description: 1.  Monitor for areas of redness and/or skin breakdown  2.  Assess vascular access sites hourly  3.  Every 4-6 hours minimum:  Change oxygen saturation probe site  4.  Every 4-6 hours:  If on nasal continuous positive airway pressure, respiratory therapy assess nares and determine need for appliance change or resting period.  Outcome: Adequate for Discharge    Pt is to be discharged to rehab from facility, Vitals are documented in flow sheet, IV's are out and intact.

## 2024-09-06 NOTE — DISCHARGE SUMMARY
Hospitalist Discharge Summary     Patient ID:  Kenyatta Santana  [id number]  78 y.o.  1945    Admit date: 8/24/2024    Discharge date and time: 9/6/2024    Admission Diagnoses: Closed fracture of neck of left femur, initial encounter ([facility]) [S72.002A]  Closed left hip fracture, initial encounter ([facility]) [S72.002A]  Displaced fracture of left femoral neck ([facility]) [S72.002A]    Discharge Diagnoses:    Principal Problem:    Closed left hip fracture, initial encounter ([facility])  Active Problems:    Displaced fracture of left femoral neck ([facility])    Prediabetes    Closed fracture of neck of left femur ([facility])    Atrial fibrillation with rapid ventricular response ([facility])  Resolved Problems:    * No resolved hospital problems. *         Hospital Course: Kenyatta Santana is a 78 y.o.  female with PMHx hypertension, paroxysmal A-fib followed by [facility], presented with a fall after tripping over a carpet in a store and landed on her left hip. Admitted for L femoral neck fracture s/p repair. Course complicated by A fib with RVR s/p cardioversion.       Paroxysmal atrial fibrillation with RVR  Secondary hypercoagulable state due to A-fib  Long-term anticoagulation with Eliquis  - Normal stress test 2 months ago. Echo 8/25/2024 showed EF 55%, abnormal diastolic function, mild TR.   - Card/EP consulted for a fib with RVR despite being on dilt and metoprolol. Trial of digoxin 8/30 - 9/3 with persistent RVR > discontinued. Trial of Amio 9/3 with persistent a fib with RVR. Underwent successful AMARJIT/CV 9/5. Metoprolol discontinued.   - Cont eliquis   - Continue amiodarone 200mg bid x 2 weeks, then 200 mg daily until follow up with EP   - Continue diltiazem 300 mg QD, monitor BP   - Cleared by cardiology for DC. Follow up [facility] on discharge.      Covid +   - Incidental finding during testing for SNF placement   - Asymptomatic      Left femoral neck fracture due to mechanical fall, POA  - s/p left hip hemiarthroplasty 8/26/2024  - pain well controlled   -  daily  Qty: 30 tablet, Refills: 0      dilTIAZem (CARDIZEM CD) 300 MG extended release capsule Take 1 capsule by mouth daily  Qty: 30 capsule, Refills: 0       !! - Potential duplicate medications found. Please discuss with provider.        CONTINUE these medications which have NOT CHANGED    Details   apixaban (ELIQUIS) 5 MG TABS tablet Take 1 tablet by mouth 2 times daily           STOP taking these medications       lisinopril (PRINIVIL;ZESTRIL) 40 MG tablet Comments:   Reason for Stopping:         amLODIPine (NORVASC) 5 MG tablet Comments:   Reason for Stopping:         dilTIAZem (DILACOR XR) 240 MG extended release capsule Comments:   Reason for Stopping:         flecainide (TAMBOCOR) 50 MG tablet Comments:   Reason for Stopping:             Activity:   Orthopedic instructions:   1. Do not bend greater than 90 degrees at the hip for 4 weeks following your discharge  2. Avoid exercises or activities which bring the leg out or away from the mid-line of the body. The surgical repair involves this muscle and it will require 4 weeks to heal. You may disregard these instructions for a direct anterior approach.   3. You may walk as tolerated and are encouraged to work daily on progressing your activities with a walker initially.   4. You may transition to a cane for walking 5-7 days from surgery once you feel safe. You may use a walker for longer periods if you feel unstable.   5. Call my office for routine questions M-F at [phone]. You may contact me directly through ALTHIA if there are specific questions or text / call using my cell number [phone].    Diet: cardiac diet and diabetic diet    Wound Care: none needed    Follow-up with new pcp, Dr. Rosenthal 10/9/24     Approximate time spent in patient care on day of discharge: 30    Signed:  Miguel aldana MD  9/6/2024  12:29 PM

## 2024-09-06 NOTE — CARE COORDINATION
1:17 PM  09/06/24    Transition of Care Plan to SNF/Rehab    Communication to Patient/Family:   Met with patient and family and they are agreeable to the transition plan. The Plan for Transition of Care is related to the following treatment goals: Closed fracture of neck of left femur, initial encounter (HCC) [S72.002A]  Closed left hip fracture, initial encounter (HCC) [S72.002A]  Displaced fracture of left femoral neck (HCC) [S72.002A]      The Patient and/or patient representative was provided with a choice of provider and agrees  with the discharge plan.      Yes [x] No []    A Freedom of choice list was provided with basic dialogue that supports the patient's individualized plan of care/goals and shares the quality data associated with the providers.       Yes [x] No []    SNF/Rehab Transition:    Patient has been accepted to [facility] nursing [facility] and meets criteria for admission.     Medicare 3 night stay satisfied? [x]    Patient will be transported by Hospital to Home and expected to leave at 4 p.m.    PCS completed, and packet in chart.     Communication to SNF/Rehab:    Bedside RN, Jesus, has been notified to update the transition plan to the facility and call report ([phone]); ask for charge nurse. Pt is going to room 308P      Discharge information has been updated on the AVS. And communicated to facility via TELOS/[employer], or CC link.     Discharge instructions to be fax'd to facility via [x] AllScripts [] CCLink      Nursing Please include all hard scripts for controlled substances, med rec and dc summary, and AVS in packet.       Nursing, please discuss the following applicable information with the facility's nursing during report:     Tom with (X) only those applicable:  Medication:  [x]Medications are available at the facility  []IV Antibiotics    []Controlled Substance - hard copies available sent.  []Weekly Labs    Equipment:  []CPAP/BiPAP  []Wound

## 2024-09-06 NOTE — PROGRESS NOTES
[facility] CARDIOLOGY  Cardiology Note     []Initial Encounter     [x]Follow-up    Patient Name: Kenyatta Santana - :1945 - MRN:[id number]  Primary Cardiologist: WILLIAM ([facility])  Rounding cardiologist:  Dr. lyman     Reason for consult:  Preop evaluation.  24: Reconsulted for afib with RVR    Interval HPI:  This a.m.  pt underwent left bipolar hip hemiarthroplasty.  This afternoon, pt went into afib with RVR with HR as high as 180's-200 per documentation. EKG showed afib with RVR. RRT was called. She was given a total of 3 doses of IV lopressor and she was transferred to [facility]. Upon arrival to [facility], she was noted to be in NSR with HR in the 70's.  Pt reports during RVR episode, she had no chest pain, SOB, dizziness, palpitations or lightheadedness.     SUBJECTIVE:    Pt feels well      Assessment and Plan       Paroxysmal atrial fib/a-flutter with RVR  - She had stress test at [facility] 2 months ago which reportedly showed no ischemia.  - Echo 24 shows EF 55%, abnormal diastolic function. Mild TR, trace MR.    - con Eliquis   - s/p AMARJIT/DCCV  with restoration of SR. Cont dilt, amio (200mg bid x 2 weeks, then 200 mg daily)  - metoprolol dc'd      2. Left femoral neck fracture secondary to mechanical fall  -s/p left hip hemiarthroplasty    3.Hypertension  - BP softer, d/c norvasc and hold lisinopril to allow for further rate control    4. Pre diabetes  - A1c 6.4   - Management per hospitalist team    5. COVID 19  - asymptomatic, supportive care    Pt may d/c whenever otherwise ready. She should follow up closely w/ [facility] on discharge, discussed with pt. Will see PRN            CARDIOLOGY ATTENDING  Patient personally seen and examined. All the elements of history and examination were personally performed. Assessment and plan was discussed and agree.       Doing OK   NAD, normal resp effort, no edema. Good BS, RRR  Tele - NSR     1) Paroxysmal atrial fibrillation with RVR  - in/out  neck. Multiple moderate stones. No acute  cholecystitis.    23X      No results for input(s): \"CPK\" in the last 72 hours.    Invalid input(s): \"CKQMB\", \"CPKMB\", \"TROIQ\", \"BMPP\"  Recent Labs     09/04/24  0442      K 4.3   *   CO2 27   BUN 18     Recent Labs     09/04/24  0442   WBC 11.4*   HGB 11.6   HCT 36.4        No results for input(s): \"INR\" in the last 72 hours.    Invalid input(s): \"PTTP\", \"PTP\", \"GPT\", \"SGOT\", \"AP\"  No results for input(s): \"CHOL\", \"HDLC\", \"HBA1C\" in the last 72 hours.    Invalid input(s): \"TGL\", \"LDLC\"  No results for input(s): \"ESR\", \"CRP\", \"TSH\" in the last 72 hours.          Current meds:    Current Facility-Administered Medications:     amiodarone (CORDARONE) tablet 400 mg, 400 mg, Oral, BID, Manuel Julian MD, 400 mg at 09/05/24 2042    dilTIAZem (CARDIZEM CD) extended release capsule 300 mg, 300 mg, Oral, Daily, Manuel Julian MD, 300 mg at 09/05/24 1016    apixaban (ELIQUIS) tablet 5 mg, 5 mg, Oral, BID, Lisandra Henry APRN - NP, 5 mg at 09/05/24 2042    metoprolol (LOPRESSOR) injection 2.5 mg, 2.5 mg, IntraVENous, Q4H PRN, Amalia Walden APRN - NP, 2.5 mg at 09/01/24 1004    sodium chloride 0.9 % bolus 500 mL, 500 mL, IntraVENous, Once, Nathan Sapp PA-C    sodium chloride flush 0.9 % injection 5-40 mL, 5-40 mL, IntraVENous, 2 times per day, Nathan Sapp PA-C, 10 mL at 09/05/24 1017    sodium chloride flush 0.9 % injection 5-40 mL, 5-40 mL, IntraVENous, PRN, Nathan Sapp PA-C    0.9 % sodium chloride infusion, , IntraVENous, PRN, Nathan Sapp PA-C    acetaminophen (TYLENOL) tablet 650 mg, 650 mg, Oral, Q6H, Nathan Sapp PA-C, 650 mg at 09/06/24 0634    oxyCODONE (ROXICODONE) immediate release tablet 5 mg, 5 mg, Oral, Q3H PRN, 5 mg at 08/28/24 0750 **OR** oxyCODONE (ROXICODONE) immediate release tablet 10 mg, 10 mg, Oral, Q3H PRN, Nathan Sapp PA-C    HYDROmorphone (DILAUDID) injection 0.5 mg, 0.5 mg, IntraVENous, Q3H PRN

## 2024-09-19 ENCOUNTER — HOSPITAL ENCOUNTER (INPATIENT)
Facility: HOSPITAL | Age: 79
LOS: 4 days | Discharge: NURSING, REHAB OR LONG-TERM CARE | DRG: 309 | End: 2024-09-23
Attending: EMERGENCY MEDICINE | Admitting: HOSPITALIST
Payer: MEDICARE

## 2024-09-19 ENCOUNTER — APPOINTMENT (OUTPATIENT)
Facility: HOSPITAL | Age: 79
DRG: 309 | End: 2024-09-19
Payer: MEDICARE

## 2024-09-19 DIAGNOSIS — I48.91 ATRIAL FIBRILLATION WITH RVR (HCC): Primary | ICD-10-CM

## 2024-09-19 LAB
ALBUMIN SERPL-MCNC: 3.4 G/DL (ref 3.5–5)
ALBUMIN/GLOB SERPL: 0.9 (ref 1.1–2.2)
ALP SERPL-CCNC: 137 U/L (ref 45–117)
ALT SERPL-CCNC: 25 U/L (ref 12–78)
ANION GAP SERPL CALC-SCNC: 6 MMOL/L (ref 2–12)
AST SERPL-CCNC: 23 U/L (ref 15–37)
BASOPHILS # BLD: 0.1 K/UL (ref 0–0.1)
BASOPHILS NFR BLD: 1 % (ref 0–1)
BILIRUB SERPL-MCNC: 0.4 MG/DL (ref 0.2–1)
BUN SERPL-MCNC: 14 MG/DL (ref 6–20)
BUN/CREAT SERPL: 16 (ref 12–20)
CALCIUM SERPL-MCNC: 9.9 MG/DL (ref 8.5–10.1)
CHLORIDE SERPL-SCNC: 108 MMOL/L (ref 97–108)
CO2 SERPL-SCNC: 25 MMOL/L (ref 21–32)
CREAT SERPL-MCNC: 0.87 MG/DL (ref 0.55–1.02)
DIFFERENTIAL METHOD BLD: ABNORMAL
EOSINOPHIL # BLD: 0 K/UL (ref 0–0.4)
EOSINOPHIL NFR BLD: 0 % (ref 0–7)
ERYTHROCYTE [DISTWIDTH] IN BLOOD BY AUTOMATED COUNT: 14 % (ref 11.5–14.5)
GLOBULIN SER CALC-MCNC: 3.9 G/DL (ref 2–4)
GLUCOSE SERPL-MCNC: 169 MG/DL (ref 65–100)
HCT VFR BLD AUTO: 39.3 % (ref 35–47)
HGB BLD-MCNC: 12.5 G/DL (ref 11.5–16)
IMM GRANULOCYTES # BLD AUTO: 0.1 K/UL (ref 0–0.04)
IMM GRANULOCYTES NFR BLD AUTO: 1 % (ref 0–0.5)
LYMPHOCYTES # BLD: 0.8 K/UL (ref 0.8–3.5)
LYMPHOCYTES NFR BLD: 7 % (ref 12–49)
MAGNESIUM SERPL-MCNC: 2.2 MG/DL (ref 1.6–2.4)
MCH RBC QN AUTO: 30 PG (ref 26–34)
MCHC RBC AUTO-ENTMCNC: 31.8 G/DL (ref 30–36.5)
MCV RBC AUTO: 94.2 FL (ref 80–99)
MONOCYTES # BLD: 0.9 K/UL (ref 0–1)
MONOCYTES NFR BLD: 8 % (ref 5–13)
NEUTS SEG # BLD: 9.1 K/UL (ref 1.8–8)
NEUTS SEG NFR BLD: 83 % (ref 32–75)
NRBC # BLD: 0 K/UL (ref 0–0.01)
NRBC BLD-RTO: 0 PER 100 WBC
PLATELET # BLD AUTO: 299 K/UL (ref 150–400)
PLATELET COMMENT: ABNORMAL
PMV BLD AUTO: 11.5 FL (ref 8.9–12.9)
POTASSIUM SERPL-SCNC: 4.2 MMOL/L (ref 3.5–5.1)
PROT SERPL-MCNC: 7.3 G/DL (ref 6.4–8.2)
RBC # BLD AUTO: 4.17 M/UL (ref 3.8–5.2)
RBC MORPH BLD: ABNORMAL
SODIUM SERPL-SCNC: 139 MMOL/L (ref 136–145)
TROPONIN I SERPL HS-MCNC: 9 NG/L (ref 0–51)
WBC # BLD AUTO: 11 K/UL (ref 3.6–11)

## 2024-09-19 PROCEDURE — 2580000003 HC RX 258: Performed by: EMERGENCY MEDICINE

## 2024-09-19 PROCEDURE — 36415 COLL VENOUS BLD VENIPUNCTURE: CPT

## 2024-09-19 PROCEDURE — 85025 COMPLETE CBC W/AUTO DIFF WBC: CPT

## 2024-09-19 PROCEDURE — 71045 X-RAY EXAM CHEST 1 VIEW: CPT

## 2024-09-19 PROCEDURE — 84484 ASSAY OF TROPONIN QUANT: CPT

## 2024-09-19 PROCEDURE — 83735 ASSAY OF MAGNESIUM: CPT

## 2024-09-19 PROCEDURE — 2500000003 HC RX 250 WO HCPCS: Performed by: EMERGENCY MEDICINE

## 2024-09-19 PROCEDURE — APPSS30 APP SPLIT SHARED TIME 16-30 MINUTES: Performed by: NURSE PRACTITIONER

## 2024-09-19 PROCEDURE — 99222 1ST HOSP IP/OBS MODERATE 55: CPT | Performed by: INTERNAL MEDICINE

## 2024-09-19 PROCEDURE — 99285 EMERGENCY DEPT VISIT HI MDM: CPT

## 2024-09-19 PROCEDURE — 2580000003 HC RX 258: Performed by: HOSPITALIST

## 2024-09-19 PROCEDURE — 93005 ELECTROCARDIOGRAM TRACING: CPT | Performed by: EMERGENCY MEDICINE

## 2024-09-19 PROCEDURE — 96374 THER/PROPH/DIAG INJ IV PUSH: CPT

## 2024-09-19 PROCEDURE — 80053 COMPREHEN METABOLIC PANEL: CPT

## 2024-09-19 PROCEDURE — 2060000000 HC ICU INTERMEDIATE R&B

## 2024-09-19 PROCEDURE — 6370000000 HC RX 637 (ALT 250 FOR IP): Performed by: HOSPITALIST

## 2024-09-19 RX ORDER — SODIUM CHLORIDE 0.9 % (FLUSH) 0.9 %
5-40 SYRINGE (ML) INJECTION EVERY 12 HOURS SCHEDULED
Status: DISCONTINUED | OUTPATIENT
Start: 2024-09-19 | End: 2024-09-23 | Stop reason: HOSPADM

## 2024-09-19 RX ORDER — ACETAMINOPHEN 325 MG/1
650 TABLET ORAL EVERY 6 HOURS PRN
Status: DISCONTINUED | OUTPATIENT
Start: 2024-09-19 | End: 2024-09-23 | Stop reason: HOSPADM

## 2024-09-19 RX ORDER — MAGNESIUM SULFATE IN WATER 40 MG/ML
2000 INJECTION, SOLUTION INTRAVENOUS PRN
Status: DISCONTINUED | OUTPATIENT
Start: 2024-09-19 | End: 2024-09-23 | Stop reason: HOSPADM

## 2024-09-19 RX ORDER — ACETAMINOPHEN 650 MG/1
650 SUPPOSITORY RECTAL EVERY 6 HOURS PRN
Status: DISCONTINUED | OUTPATIENT
Start: 2024-09-19 | End: 2024-09-23 | Stop reason: HOSPADM

## 2024-09-19 RX ORDER — POLYETHYLENE GLYCOL 3350 17 G/17G
17 POWDER, FOR SOLUTION ORAL DAILY PRN
Status: DISCONTINUED | OUTPATIENT
Start: 2024-09-19 | End: 2024-09-23 | Stop reason: HOSPADM

## 2024-09-19 RX ORDER — POTASSIUM CHLORIDE 7.45 MG/ML
10 INJECTION INTRAVENOUS PRN
Status: DISCONTINUED | OUTPATIENT
Start: 2024-09-19 | End: 2024-09-23 | Stop reason: HOSPADM

## 2024-09-19 RX ORDER — DILTIAZEM HYDROCHLORIDE 5 MG/ML
10 INJECTION INTRAVENOUS ONCE
Status: COMPLETED | OUTPATIENT
Start: 2024-09-19 | End: 2024-09-19

## 2024-09-19 RX ORDER — ONDANSETRON 2 MG/ML
4 INJECTION INTRAMUSCULAR; INTRAVENOUS EVERY 6 HOURS PRN
Status: DISCONTINUED | OUTPATIENT
Start: 2024-09-19 | End: 2024-09-23 | Stop reason: HOSPADM

## 2024-09-19 RX ORDER — POTASSIUM CHLORIDE 750 MG/1
40 TABLET, EXTENDED RELEASE ORAL PRN
Status: DISCONTINUED | OUTPATIENT
Start: 2024-09-19 | End: 2024-09-23 | Stop reason: HOSPADM

## 2024-09-19 RX ORDER — SODIUM CHLORIDE 9 MG/ML
INJECTION, SOLUTION INTRAVENOUS ONCE
Status: COMPLETED | OUTPATIENT
Start: 2024-09-19 | End: 2024-09-19

## 2024-09-19 RX ORDER — ONDANSETRON 4 MG/1
4 TABLET, ORALLY DISINTEGRATING ORAL EVERY 8 HOURS PRN
Status: DISCONTINUED | OUTPATIENT
Start: 2024-09-19 | End: 2024-09-23 | Stop reason: HOSPADM

## 2024-09-19 RX ORDER — SODIUM CHLORIDE 9 MG/ML
INJECTION, SOLUTION INTRAVENOUS PRN
Status: DISCONTINUED | OUTPATIENT
Start: 2024-09-19 | End: 2024-09-23 | Stop reason: HOSPADM

## 2024-09-19 RX ORDER — SODIUM CHLORIDE 0.9 % (FLUSH) 0.9 %
5-40 SYRINGE (ML) INJECTION PRN
Status: DISCONTINUED | OUTPATIENT
Start: 2024-09-19 | End: 2024-09-23 | Stop reason: HOSPADM

## 2024-09-19 RX ADMIN — SODIUM CHLORIDE, PRESERVATIVE FREE 10 ML: 5 INJECTION INTRAVENOUS at 20:47

## 2024-09-19 RX ADMIN — SODIUM CHLORIDE: 9 INJECTION, SOLUTION INTRAVENOUS at 13:49

## 2024-09-19 RX ADMIN — DILTIAZEM HYDROCHLORIDE 10 MG: 5 INJECTION, SOLUTION INTRAVENOUS at 13:49

## 2024-09-19 RX ADMIN — APIXABAN 5 MG: 5 TABLET, FILM COATED ORAL at 20:46

## 2024-09-19 RX ADMIN — SODIUM CHLORIDE 2.5 MG/HR: 900 INJECTION, SOLUTION INTRAVENOUS at 18:21

## 2024-09-19 ASSESSMENT — PAIN - FUNCTIONAL ASSESSMENT: PAIN_FUNCTIONAL_ASSESSMENT: NONE - DENIES PAIN

## 2024-09-20 ENCOUNTER — APPOINTMENT (OUTPATIENT)
Facility: HOSPITAL | Age: 79
DRG: 309 | End: 2024-09-20
Payer: MEDICARE

## 2024-09-20 LAB
ANION GAP SERPL CALC-SCNC: 3 MMOL/L (ref 2–12)
APPEARANCE UR: ABNORMAL
BACTERIA URNS QL MICRO: ABNORMAL /HPF
BASOPHILS # BLD: 0.1 K/UL (ref 0–0.1)
BASOPHILS NFR BLD: 1 % (ref 0–1)
BILIRUB UR QL: NEGATIVE
BUN SERPL-MCNC: 8 MG/DL (ref 6–20)
BUN/CREAT SERPL: 13 (ref 12–20)
CALCIUM SERPL-MCNC: 9.1 MG/DL (ref 8.5–10.1)
CHLORIDE SERPL-SCNC: 112 MMOL/L (ref 97–108)
CO2 SERPL-SCNC: 28 MMOL/L (ref 21–32)
COLOR UR: ABNORMAL
CREAT SERPL-MCNC: 0.61 MG/DL (ref 0.55–1.02)
DIFFERENTIAL METHOD BLD: ABNORMAL
EKG ATRIAL RATE: 340 BPM
EKG DIAGNOSIS: NORMAL
EKG Q-T INTERVAL: 326 MS
EKG QRS DURATION: 76 MS
EKG QTC CALCULATION (BAZETT): 472 MS
EKG R AXIS: -2 DEGREES
EKG T AXIS: 3 DEGREES
EKG VENTRICULAR RATE: 126 BPM
EOSINOPHIL # BLD: 0.1 K/UL (ref 0–0.4)
EOSINOPHIL NFR BLD: 1 % (ref 0–7)
EPITH CASTS URNS QL MICRO: ABNORMAL /LPF
ERYTHROCYTE [DISTWIDTH] IN BLOOD BY AUTOMATED COUNT: 14 % (ref 11.5–14.5)
GLUCOSE SERPL-MCNC: 127 MG/DL (ref 65–100)
GLUCOSE UR STRIP.AUTO-MCNC: NEGATIVE MG/DL
HCT VFR BLD AUTO: 36 % (ref 35–47)
HGB BLD-MCNC: 11.5 G/DL (ref 11.5–16)
HGB UR QL STRIP: ABNORMAL
HYALINE CASTS URNS QL MICRO: ABNORMAL /LPF (ref 0–2)
IMM GRANULOCYTES # BLD AUTO: 0.1 K/UL (ref 0–0.04)
IMM GRANULOCYTES NFR BLD AUTO: 1 % (ref 0–0.5)
KETONES UR QL STRIP.AUTO: NEGATIVE MG/DL
LEUKOCYTE ESTERASE UR QL STRIP.AUTO: ABNORMAL
LYMPHOCYTES # BLD: 0.6 K/UL (ref 0.8–3.5)
LYMPHOCYTES NFR BLD: 10 % (ref 12–49)
MCH RBC QN AUTO: 30.3 PG (ref 26–34)
MCHC RBC AUTO-ENTMCNC: 31.9 G/DL (ref 30–36.5)
MCV RBC AUTO: 95 FL (ref 80–99)
MONOCYTES # BLD: 0.6 K/UL (ref 0–1)
MONOCYTES NFR BLD: 10 % (ref 5–13)
NEUTS SEG # BLD: 4.9 K/UL (ref 1.8–8)
NEUTS SEG NFR BLD: 77 % (ref 32–75)
NITRITE UR QL STRIP.AUTO: NEGATIVE
NRBC # BLD: 0 K/UL (ref 0–0.01)
NRBC BLD-RTO: 0 PER 100 WBC
PH UR STRIP: 7 (ref 5–8)
PLATELET # BLD AUTO: 263 K/UL (ref 150–400)
PMV BLD AUTO: 10.9 FL (ref 8.9–12.9)
POTASSIUM SERPL-SCNC: 4.2 MMOL/L (ref 3.5–5.1)
PROT UR STRIP-MCNC: 100 MG/DL
RBC # BLD AUTO: 3.79 M/UL (ref 3.8–5.2)
RBC #/AREA URNS HPF: ABNORMAL /HPF (ref 0–5)
RBC MORPH BLD: ABNORMAL
SODIUM SERPL-SCNC: 143 MMOL/L (ref 136–145)
SP GR UR REFRACTOMETRY: 1.01 (ref 1–1.03)
TSH SERPL DL<=0.05 MIU/L-ACNC: 1.11 UIU/ML (ref 0.36–3.74)
URINE CULTURE IF INDICATED: ABNORMAL
UROBILINOGEN UR QL STRIP.AUTO: 0.2 EU/DL (ref 0.2–1)
WBC # BLD AUTO: 6.4 K/UL (ref 3.6–11)
WBC URNS QL MICRO: ABNORMAL /HPF (ref 0–4)

## 2024-09-20 PROCEDURE — 94761 N-INVAS EAR/PLS OXIMETRY MLT: CPT

## 2024-09-20 PROCEDURE — 84443 ASSAY THYROID STIM HORMONE: CPT

## 2024-09-20 PROCEDURE — 2060000000 HC ICU INTERMEDIATE R&B

## 2024-09-20 PROCEDURE — 87086 URINE CULTURE/COLONY COUNT: CPT

## 2024-09-20 PROCEDURE — 6360000002 HC RX W HCPCS: Performed by: INTERNAL MEDICINE

## 2024-09-20 PROCEDURE — 2580000003 HC RX 258: Performed by: INTERNAL MEDICINE

## 2024-09-20 PROCEDURE — 93010 ELECTROCARDIOGRAM REPORT: CPT | Performed by: INTERNAL MEDICINE

## 2024-09-20 PROCEDURE — 81001 URINALYSIS AUTO W/SCOPE: CPT

## 2024-09-20 PROCEDURE — APPSS30 APP SPLIT SHARED TIME 16-30 MINUTES: Performed by: NURSE PRACTITIONER

## 2024-09-20 PROCEDURE — 70450 CT HEAD/BRAIN W/O DYE: CPT

## 2024-09-20 PROCEDURE — 2580000003 HC RX 258: Performed by: HOSPITALIST

## 2024-09-20 PROCEDURE — 6370000000 HC RX 637 (ALT 250 FOR IP): Performed by: HOSPITALIST

## 2024-09-20 PROCEDURE — 6370000000 HC RX 637 (ALT 250 FOR IP): Performed by: NURSE PRACTITIONER

## 2024-09-20 PROCEDURE — 85025 COMPLETE CBC W/AUTO DIFF WBC: CPT

## 2024-09-20 PROCEDURE — 80048 BASIC METABOLIC PNL TOTAL CA: CPT

## 2024-09-20 PROCEDURE — 36415 COLL VENOUS BLD VENIPUNCTURE: CPT

## 2024-09-20 RX ORDER — DILTIAZEM HYDROCHLORIDE 300 MG/1
300 CAPSULE, COATED, EXTENDED RELEASE ORAL DAILY
Status: DISCONTINUED | OUTPATIENT
Start: 2024-09-21 | End: 2024-09-23 | Stop reason: HOSPADM

## 2024-09-20 RX ORDER — AMIODARONE HYDROCHLORIDE 200 MG/1
400 TABLET ORAL 2 TIMES DAILY
Status: DISCONTINUED | OUTPATIENT
Start: 2024-09-20 | End: 2024-09-23 | Stop reason: HOSPADM

## 2024-09-20 RX ORDER — DILTIAZEM HYDROCHLORIDE 30 MG/1
60 TABLET, FILM COATED ORAL EVERY 6 HOURS SCHEDULED
Status: COMPLETED | OUTPATIENT
Start: 2024-09-20 | End: 2024-09-20

## 2024-09-20 RX ADMIN — DILTIAZEM HYDROCHLORIDE 60 MG: 30 TABLET ORAL at 12:20

## 2024-09-20 RX ADMIN — WATER 1000 MG: 1 INJECTION INTRAMUSCULAR; INTRAVENOUS; SUBCUTANEOUS at 14:23

## 2024-09-20 RX ADMIN — SODIUM CHLORIDE, PRESERVATIVE FREE 10 ML: 5 INJECTION INTRAVENOUS at 21:11

## 2024-09-20 RX ADMIN — APIXABAN 5 MG: 5 TABLET, FILM COATED ORAL at 21:11

## 2024-09-20 RX ADMIN — DILTIAZEM HYDROCHLORIDE 60 MG: 30 TABLET ORAL at 09:46

## 2024-09-20 RX ADMIN — APIXABAN 5 MG: 5 TABLET, FILM COATED ORAL at 09:46

## 2024-09-20 RX ADMIN — DILTIAZEM HYDROCHLORIDE 60 MG: 30 TABLET ORAL at 17:30

## 2024-09-20 RX ADMIN — AMIODARONE HYDROCHLORIDE 400 MG: 200 TABLET ORAL at 16:49

## 2024-09-21 LAB
ALBUMIN SERPL-MCNC: 2.9 G/DL (ref 3.5–5)
ALBUMIN/GLOB SERPL: 0.9 (ref 1.1–2.2)
ALP SERPL-CCNC: 111 U/L (ref 45–117)
ALT SERPL-CCNC: 18 U/L (ref 12–78)
AMMONIA PLAS-SCNC: 25 UMOL/L
ANION GAP SERPL CALC-SCNC: 4 MMOL/L (ref 2–12)
AST SERPL-CCNC: 14 U/L (ref 15–37)
BACTERIA SPEC CULT: NORMAL
BASOPHILS # BLD: 0 K/UL (ref 0–0.1)
BASOPHILS NFR BLD: 0 % (ref 0–1)
BILIRUB SERPL-MCNC: 0.4 MG/DL (ref 0.2–1)
BUN SERPL-MCNC: 16 MG/DL (ref 6–20)
BUN/CREAT SERPL: 21 (ref 12–20)
CALCIUM SERPL-MCNC: 9.5 MG/DL (ref 8.5–10.1)
CC UR VC: NORMAL
CHLORIDE SERPL-SCNC: 110 MMOL/L (ref 97–108)
CO2 SERPL-SCNC: 26 MMOL/L (ref 21–32)
CREAT SERPL-MCNC: 0.77 MG/DL (ref 0.55–1.02)
DIFFERENTIAL METHOD BLD: ABNORMAL
EOSINOPHIL # BLD: 0.1 K/UL (ref 0–0.4)
EOSINOPHIL NFR BLD: 1 % (ref 0–7)
ERYTHROCYTE [DISTWIDTH] IN BLOOD BY AUTOMATED COUNT: 14 % (ref 11.5–14.5)
GLOBULIN SER CALC-MCNC: 3.2 G/DL (ref 2–4)
GLUCOSE SERPL-MCNC: 133 MG/DL (ref 65–100)
HCT VFR BLD AUTO: 35.9 % (ref 35–47)
HGB BLD-MCNC: 11.2 G/DL (ref 11.5–16)
IMM GRANULOCYTES # BLD AUTO: 0 K/UL (ref 0–0.04)
IMM GRANULOCYTES NFR BLD AUTO: 0 % (ref 0–0.5)
LYMPHOCYTES # BLD: 0.8 K/UL (ref 0.8–3.5)
LYMPHOCYTES NFR BLD: 11 % (ref 12–49)
MCH RBC QN AUTO: 30.3 PG (ref 26–34)
MCHC RBC AUTO-ENTMCNC: 31.2 G/DL (ref 30–36.5)
MCV RBC AUTO: 97 FL (ref 80–99)
MONOCYTES # BLD: 0.7 K/UL (ref 0–1)
MONOCYTES NFR BLD: 10 % (ref 5–13)
NEUTS SEG # BLD: 5.7 K/UL (ref 1.8–8)
NEUTS SEG NFR BLD: 78 % (ref 32–75)
NRBC # BLD: 0 K/UL (ref 0–0.01)
NRBC BLD-RTO: 0 PER 100 WBC
PLATELET # BLD AUTO: 253 K/UL (ref 150–400)
PMV BLD AUTO: 11.5 FL (ref 8.9–12.9)
POTASSIUM SERPL-SCNC: 4 MMOL/L (ref 3.5–5.1)
PROT SERPL-MCNC: 6.1 G/DL (ref 6.4–8.2)
RBC # BLD AUTO: 3.7 M/UL (ref 3.8–5.2)
SERVICE CMNT-IMP: NORMAL
SODIUM SERPL-SCNC: 140 MMOL/L (ref 136–145)
WBC # BLD AUTO: 7.4 K/UL (ref 3.6–11)

## 2024-09-21 PROCEDURE — 2060000000 HC ICU INTERMEDIATE R&B

## 2024-09-21 PROCEDURE — 80053 COMPREHEN METABOLIC PANEL: CPT

## 2024-09-21 PROCEDURE — 6370000000 HC RX 637 (ALT 250 FOR IP): Performed by: HOSPITALIST

## 2024-09-21 PROCEDURE — 82140 ASSAY OF AMMONIA: CPT

## 2024-09-21 PROCEDURE — 2580000003 HC RX 258: Performed by: HOSPITALIST

## 2024-09-21 PROCEDURE — 6360000002 HC RX W HCPCS: Performed by: INTERNAL MEDICINE

## 2024-09-21 PROCEDURE — 2580000003 HC RX 258: Performed by: INTERNAL MEDICINE

## 2024-09-21 PROCEDURE — 6370000000 HC RX 637 (ALT 250 FOR IP): Performed by: NURSE PRACTITIONER

## 2024-09-21 PROCEDURE — 36415 COLL VENOUS BLD VENIPUNCTURE: CPT

## 2024-09-21 PROCEDURE — 85025 COMPLETE CBC W/AUTO DIFF WBC: CPT

## 2024-09-21 PROCEDURE — 94761 N-INVAS EAR/PLS OXIMETRY MLT: CPT

## 2024-09-21 RX ADMIN — WATER 1000 MG: 1 INJECTION INTRAMUSCULAR; INTRAVENOUS; SUBCUTANEOUS at 14:14

## 2024-09-21 RX ADMIN — SODIUM CHLORIDE, PRESERVATIVE FREE 10 ML: 5 INJECTION INTRAVENOUS at 09:17

## 2024-09-21 RX ADMIN — DILTIAZEM HYDROCHLORIDE 300 MG: 300 CAPSULE, EXTENDED RELEASE ORAL at 09:17

## 2024-09-21 RX ADMIN — AMIODARONE HYDROCHLORIDE 400 MG: 200 TABLET ORAL at 09:17

## 2024-09-21 RX ADMIN — AMIODARONE HYDROCHLORIDE 400 MG: 200 TABLET ORAL at 20:43

## 2024-09-21 RX ADMIN — APIXABAN 5 MG: 5 TABLET, FILM COATED ORAL at 20:43

## 2024-09-21 RX ADMIN — APIXABAN 5 MG: 5 TABLET, FILM COATED ORAL at 09:17

## 2024-09-21 RX ADMIN — SODIUM CHLORIDE, PRESERVATIVE FREE 10 ML: 5 INJECTION INTRAVENOUS at 20:43

## 2024-09-21 RX ADMIN — SODIUM CHLORIDE, PRESERVATIVE FREE 10 ML: 5 INJECTION INTRAVENOUS at 14:15

## 2024-09-22 LAB
ALBUMIN SERPL-MCNC: 2.8 G/DL (ref 3.5–5)
ALBUMIN/GLOB SERPL: 0.9 (ref 1.1–2.2)
ALP SERPL-CCNC: 108 U/L (ref 45–117)
ALT SERPL-CCNC: 17 U/L (ref 12–78)
ANION GAP SERPL CALC-SCNC: 3 MMOL/L (ref 2–12)
AST SERPL-CCNC: 12 U/L (ref 15–37)
BASOPHILS # BLD: 0.1 K/UL (ref 0–0.1)
BASOPHILS NFR BLD: 1 % (ref 0–1)
BILIRUB SERPL-MCNC: 0.3 MG/DL (ref 0.2–1)
BUN SERPL-MCNC: 18 MG/DL (ref 6–20)
BUN/CREAT SERPL: 22 (ref 12–20)
CALCIUM SERPL-MCNC: 9.3 MG/DL (ref 8.5–10.1)
CHLORIDE SERPL-SCNC: 111 MMOL/L (ref 97–108)
CO2 SERPL-SCNC: 27 MMOL/L (ref 21–32)
CREAT SERPL-MCNC: 0.81 MG/DL (ref 0.55–1.02)
DIFFERENTIAL METHOD BLD: ABNORMAL
EOSINOPHIL # BLD: 0.2 K/UL (ref 0–0.4)
EOSINOPHIL NFR BLD: 2 % (ref 0–7)
ERYTHROCYTE [DISTWIDTH] IN BLOOD BY AUTOMATED COUNT: 14.1 % (ref 11.5–14.5)
GLOBULIN SER CALC-MCNC: 3.2 G/DL (ref 2–4)
GLUCOSE BLD STRIP.AUTO-MCNC: 146 MG/DL (ref 65–117)
GLUCOSE BLD STRIP.AUTO-MCNC: 172 MG/DL (ref 65–117)
GLUCOSE SERPL-MCNC: 129 MG/DL (ref 65–100)
HCT VFR BLD AUTO: 36.4 % (ref 35–47)
HGB BLD-MCNC: 11.7 G/DL (ref 11.5–16)
IMM GRANULOCYTES # BLD AUTO: 0 K/UL (ref 0–0.04)
IMM GRANULOCYTES NFR BLD AUTO: 0 % (ref 0–0.5)
LYMPHOCYTES # BLD: 0.7 K/UL (ref 0.8–3.5)
LYMPHOCYTES NFR BLD: 10 % (ref 12–49)
MAGNESIUM SERPL-MCNC: 2.2 MG/DL (ref 1.6–2.4)
MCH RBC QN AUTO: 30.9 PG (ref 26–34)
MCHC RBC AUTO-ENTMCNC: 32.1 G/DL (ref 30–36.5)
MCV RBC AUTO: 96 FL (ref 80–99)
MONOCYTES # BLD: 0.6 K/UL (ref 0–1)
MONOCYTES NFR BLD: 8 % (ref 5–13)
NEUTS SEG # BLD: 5.4 K/UL (ref 1.8–8)
NEUTS SEG NFR BLD: 78 % (ref 32–75)
NRBC # BLD: 0 K/UL (ref 0–0.01)
NRBC BLD-RTO: 0 PER 100 WBC
PHOSPHATE SERPL-MCNC: 3.2 MG/DL (ref 2.6–4.7)
PLATELET # BLD AUTO: 246 K/UL (ref 150–400)
PMV BLD AUTO: 11.5 FL (ref 8.9–12.9)
POTASSIUM SERPL-SCNC: 4 MMOL/L (ref 3.5–5.1)
PROT SERPL-MCNC: 6 G/DL (ref 6.4–8.2)
RBC # BLD AUTO: 3.79 M/UL (ref 3.8–5.2)
SERVICE CMNT-IMP: ABNORMAL
SERVICE CMNT-IMP: ABNORMAL
SODIUM SERPL-SCNC: 141 MMOL/L (ref 136–145)
WBC # BLD AUTO: 6.8 K/UL (ref 3.6–11)

## 2024-09-22 PROCEDURE — 82962 GLUCOSE BLOOD TEST: CPT

## 2024-09-22 PROCEDURE — 97161 PT EVAL LOW COMPLEX 20 MIN: CPT

## 2024-09-22 PROCEDURE — 2580000003 HC RX 258: Performed by: INTERNAL MEDICINE

## 2024-09-22 PROCEDURE — 84100 ASSAY OF PHOSPHORUS: CPT

## 2024-09-22 PROCEDURE — 2580000003 HC RX 258: Performed by: HOSPITALIST

## 2024-09-22 PROCEDURE — 85025 COMPLETE CBC W/AUTO DIFF WBC: CPT

## 2024-09-22 PROCEDURE — 94761 N-INVAS EAR/PLS OXIMETRY MLT: CPT

## 2024-09-22 PROCEDURE — 2060000000 HC ICU INTERMEDIATE R&B

## 2024-09-22 PROCEDURE — 6370000000 HC RX 637 (ALT 250 FOR IP): Performed by: HOSPITALIST

## 2024-09-22 PROCEDURE — 97116 GAIT TRAINING THERAPY: CPT

## 2024-09-22 PROCEDURE — 36415 COLL VENOUS BLD VENIPUNCTURE: CPT

## 2024-09-22 PROCEDURE — 80053 COMPREHEN METABOLIC PANEL: CPT

## 2024-09-22 PROCEDURE — 6370000000 HC RX 637 (ALT 250 FOR IP): Performed by: NURSE PRACTITIONER

## 2024-09-22 PROCEDURE — 83735 ASSAY OF MAGNESIUM: CPT

## 2024-09-22 PROCEDURE — 6360000002 HC RX W HCPCS: Performed by: INTERNAL MEDICINE

## 2024-09-22 RX ADMIN — APIXABAN 5 MG: 5 TABLET, FILM COATED ORAL at 20:38

## 2024-09-22 RX ADMIN — SODIUM CHLORIDE, PRESERVATIVE FREE 10 ML: 5 INJECTION INTRAVENOUS at 20:38

## 2024-09-22 RX ADMIN — ACETAMINOPHEN 650 MG: 325 TABLET ORAL at 00:05

## 2024-09-22 RX ADMIN — AMIODARONE HYDROCHLORIDE 400 MG: 200 TABLET ORAL at 08:32

## 2024-09-22 RX ADMIN — AMIODARONE HYDROCHLORIDE 400 MG: 200 TABLET ORAL at 20:37

## 2024-09-22 RX ADMIN — DILTIAZEM HYDROCHLORIDE 300 MG: 300 CAPSULE, EXTENDED RELEASE ORAL at 08:32

## 2024-09-22 RX ADMIN — WATER 1000 MG: 1 INJECTION INTRAMUSCULAR; INTRAVENOUS; SUBCUTANEOUS at 14:28

## 2024-09-22 RX ADMIN — SODIUM CHLORIDE, PRESERVATIVE FREE 10 ML: 5 INJECTION INTRAVENOUS at 08:32

## 2024-09-22 RX ADMIN — APIXABAN 5 MG: 5 TABLET, FILM COATED ORAL at 08:32

## 2024-09-23 VITALS
WEIGHT: 174 LBS | TEMPERATURE: 98.2 F | SYSTOLIC BLOOD PRESSURE: 121 MMHG | BODY MASS INDEX: 27.31 KG/M2 | HEART RATE: 60 BPM | HEIGHT: 67 IN | DIASTOLIC BLOOD PRESSURE: 58 MMHG | OXYGEN SATURATION: 97 % | RESPIRATION RATE: 14 BRPM

## 2024-09-23 LAB
ALBUMIN SERPL-MCNC: 3 G/DL (ref 3.5–5)
ALBUMIN/GLOB SERPL: 0.9 (ref 1.1–2.2)
ALP SERPL-CCNC: 110 U/L (ref 45–117)
ALT SERPL-CCNC: 18 U/L (ref 12–78)
ANION GAP SERPL CALC-SCNC: 5 MMOL/L (ref 2–12)
AST SERPL-CCNC: 14 U/L (ref 15–37)
BASOPHILS # BLD: 0 K/UL (ref 0–0.1)
BASOPHILS NFR BLD: 0 % (ref 0–1)
BILIRUB SERPL-MCNC: 0.8 MG/DL (ref 0.2–1)
BUN SERPL-MCNC: 15 MG/DL (ref 6–20)
BUN/CREAT SERPL: 21 (ref 12–20)
CALCIUM SERPL-MCNC: 9.7 MG/DL (ref 8.5–10.1)
CHLORIDE SERPL-SCNC: 110 MMOL/L (ref 97–108)
CO2 SERPL-SCNC: 25 MMOL/L (ref 21–32)
CREAT SERPL-MCNC: 0.7 MG/DL (ref 0.55–1.02)
DIFFERENTIAL METHOD BLD: ABNORMAL
EOSINOPHIL # BLD: 0.2 K/UL (ref 0–0.4)
EOSINOPHIL NFR BLD: 3 % (ref 0–7)
ERYTHROCYTE [DISTWIDTH] IN BLOOD BY AUTOMATED COUNT: 14.2 % (ref 11.5–14.5)
GLOBULIN SER CALC-MCNC: 3.3 G/DL (ref 2–4)
GLUCOSE SERPL-MCNC: 134 MG/DL (ref 65–100)
HCT VFR BLD AUTO: 36.7 % (ref 35–47)
HGB BLD-MCNC: 11.6 G/DL (ref 11.5–16)
IMM GRANULOCYTES # BLD AUTO: 0 K/UL (ref 0–0.04)
IMM GRANULOCYTES NFR BLD AUTO: 1 % (ref 0–0.5)
LYMPHOCYTES # BLD: 0.7 K/UL (ref 0.8–3.5)
LYMPHOCYTES NFR BLD: 10 % (ref 12–49)
MCH RBC QN AUTO: 30.4 PG (ref 26–34)
MCHC RBC AUTO-ENTMCNC: 31.6 G/DL (ref 30–36.5)
MCV RBC AUTO: 96.1 FL (ref 80–99)
MONOCYTES # BLD: 0.7 K/UL (ref 0–1)
MONOCYTES NFR BLD: 10 % (ref 5–13)
NEUTS SEG # BLD: 5.2 K/UL (ref 1.8–8)
NEUTS SEG NFR BLD: 77 % (ref 32–75)
NRBC # BLD: 0 K/UL (ref 0–0.01)
NRBC BLD-RTO: 0 PER 100 WBC
PLATELET # BLD AUTO: 245 K/UL (ref 150–400)
PMV BLD AUTO: 11.9 FL (ref 8.9–12.9)
POTASSIUM SERPL-SCNC: 3.9 MMOL/L (ref 3.5–5.1)
PROT SERPL-MCNC: 6.3 G/DL (ref 6.4–8.2)
RBC # BLD AUTO: 3.82 M/UL (ref 3.8–5.2)
SARS-COV-2 RNA RESP QL NAA+PROBE: DETECTED
SODIUM SERPL-SCNC: 140 MMOL/L (ref 136–145)
SOURCE: ABNORMAL
WBC # BLD AUTO: 6.8 K/UL (ref 3.6–11)

## 2024-09-23 PROCEDURE — 6370000000 HC RX 637 (ALT 250 FOR IP): Performed by: HOSPITALIST

## 2024-09-23 PROCEDURE — 94761 N-INVAS EAR/PLS OXIMETRY MLT: CPT

## 2024-09-23 PROCEDURE — 97165 OT EVAL LOW COMPLEX 30 MIN: CPT

## 2024-09-23 PROCEDURE — 97535 SELF CARE MNGMENT TRAINING: CPT

## 2024-09-23 PROCEDURE — 97530 THERAPEUTIC ACTIVITIES: CPT

## 2024-09-23 PROCEDURE — 36415 COLL VENOUS BLD VENIPUNCTURE: CPT

## 2024-09-23 PROCEDURE — 97116 GAIT TRAINING THERAPY: CPT

## 2024-09-23 PROCEDURE — 80053 COMPREHEN METABOLIC PANEL: CPT

## 2024-09-23 PROCEDURE — 6370000000 HC RX 637 (ALT 250 FOR IP): Performed by: NURSE PRACTITIONER

## 2024-09-23 PROCEDURE — 87635 SARS-COV-2 COVID-19 AMP PRB: CPT

## 2024-09-23 PROCEDURE — 2580000003 HC RX 258: Performed by: HOSPITALIST

## 2024-09-23 PROCEDURE — 85025 COMPLETE CBC W/AUTO DIFF WBC: CPT

## 2024-09-23 RX ORDER — AMIODARONE HYDROCHLORIDE 200 MG/1
200 TABLET ORAL DAILY
Qty: 30 TABLET | Refills: 0 | Status: SHIPPED
Start: 2024-09-27

## 2024-09-23 RX ORDER — AMIODARONE HYDROCHLORIDE 400 MG/1
400 TABLET ORAL 2 TIMES DAILY
Qty: 8 TABLET | Refills: 0 | Status: SHIPPED
Start: 2024-09-23 | End: 2024-09-27

## 2024-09-23 RX ADMIN — APIXABAN 5 MG: 5 TABLET, FILM COATED ORAL at 08:28

## 2024-09-23 RX ADMIN — DILTIAZEM HYDROCHLORIDE 300 MG: 300 CAPSULE, EXTENDED RELEASE ORAL at 08:28

## 2024-09-23 RX ADMIN — SODIUM CHLORIDE, PRESERVATIVE FREE 10 ML: 5 INJECTION INTRAVENOUS at 08:28

## 2024-09-23 RX ADMIN — ACETAMINOPHEN 650 MG: 325 TABLET ORAL at 10:42

## 2024-09-23 RX ADMIN — AMIODARONE HYDROCHLORIDE 400 MG: 200 TABLET ORAL at 08:28

## 2024-09-23 ASSESSMENT — PAIN SCALES - GENERAL: PAINLEVEL_OUTOF10: 6

## 2024-09-23 ASSESSMENT — PAIN DESCRIPTION - LOCATION: LOCATION: BACK

## 2024-10-09 ENCOUNTER — OFFICE VISIT (OUTPATIENT)
Facility: CLINIC | Age: 79
End: 2024-10-09
Payer: MEDICARE

## 2024-10-09 VITALS
WEIGHT: 175 LBS | HEIGHT: 66 IN | DIASTOLIC BLOOD PRESSURE: 80 MMHG | BODY MASS INDEX: 28.12 KG/M2 | HEART RATE: 80 BPM | TEMPERATURE: 97.7 F | SYSTOLIC BLOOD PRESSURE: 147 MMHG | RESPIRATION RATE: 20 BRPM | OXYGEN SATURATION: 98 %

## 2024-10-09 DIAGNOSIS — N28.89 RIGHT RENAL MASS: ICD-10-CM

## 2024-10-09 DIAGNOSIS — M81.0 OSTEOPOROSIS, UNSPECIFIED OSTEOPOROSIS TYPE, UNSPECIFIED PATHOLOGICAL FRACTURE PRESENCE: ICD-10-CM

## 2024-10-09 DIAGNOSIS — E66.3 OVERWEIGHT WITH BODY MASS INDEX (BMI) OF 28 TO 28.9 IN ADULT: ICD-10-CM

## 2024-10-09 DIAGNOSIS — Z91.81 AT HIGH RISK FOR FALLS: ICD-10-CM

## 2024-10-09 DIAGNOSIS — Z00.00 MEDICARE ANNUAL WELLNESS VISIT, INITIAL: Primary | ICD-10-CM

## 2024-10-09 DIAGNOSIS — S72.002D CLOSED FRACTURE OF NECK OF LEFT FEMUR WITH ROUTINE HEALING, SUBSEQUENT ENCOUNTER: ICD-10-CM

## 2024-10-09 DIAGNOSIS — R73.03 PREDIABETES: ICD-10-CM

## 2024-10-09 DIAGNOSIS — L60.2 ONYCHAUXIS: ICD-10-CM

## 2024-10-09 DIAGNOSIS — I48.0 PAROXYSMAL ATRIAL FIBRILLATION (HCC): ICD-10-CM

## 2024-10-09 PROCEDURE — G0438 PPPS, INITIAL VISIT: HCPCS

## 2024-10-09 PROCEDURE — G8484 FLU IMMUNIZE NO ADMIN: HCPCS

## 2024-10-09 PROCEDURE — 1036F TOBACCO NON-USER: CPT

## 2024-10-09 PROCEDURE — G8400 PT W/DXA NO RESULTS DOC: HCPCS

## 2024-10-09 PROCEDURE — G8419 CALC BMI OUT NRM PARAM NOF/U: HCPCS

## 2024-10-09 PROCEDURE — 1123F ACP DISCUSS/DSCN MKR DOCD: CPT

## 2024-10-09 PROCEDURE — 1111F DSCHRG MED/CURRENT MED MERGE: CPT

## 2024-10-09 PROCEDURE — G8428 CUR MEDS NOT DOCUMENT: HCPCS

## 2024-10-09 PROCEDURE — 1090F PRES/ABSN URINE INCON ASSESS: CPT

## 2024-10-09 PROCEDURE — 99204 OFFICE O/P NEW MOD 45 MIN: CPT

## 2024-10-09 SDOH — ECONOMIC STABILITY: FOOD INSECURITY: WITHIN THE PAST 12 MONTHS, YOU WORRIED THAT YOUR FOOD WOULD RUN OUT BEFORE YOU GOT MONEY TO BUY MORE.: NEVER TRUE

## 2024-10-09 SDOH — ECONOMIC STABILITY: FOOD INSECURITY: WITHIN THE PAST 12 MONTHS, THE FOOD YOU BOUGHT JUST DIDN'T LAST AND YOU DIDN'T HAVE MONEY TO GET MORE.: NEVER TRUE

## 2024-10-09 SDOH — ECONOMIC STABILITY: INCOME INSECURITY: HOW HARD IS IT FOR YOU TO PAY FOR THE VERY BASICS LIKE FOOD, HOUSING, MEDICAL CARE, AND HEATING?: NOT HARD AT ALL

## 2024-10-09 ASSESSMENT — PATIENT HEALTH QUESTIONNAIRE - PHQ9
1. LITTLE INTEREST OR PLEASURE IN DOING THINGS: NOT AT ALL
SUM OF ALL RESPONSES TO PHQ QUESTIONS 1-9: 0
2. FEELING DOWN, DEPRESSED OR HOPELESS: NOT AT ALL
1. LITTLE INTEREST OR PLEASURE IN DOING THINGS: NOT AT ALL
SUM OF ALL RESPONSES TO PHQ QUESTIONS 1-9: 0
SUM OF ALL RESPONSES TO PHQ9 QUESTIONS 1 & 2: 0
SUM OF ALL RESPONSES TO PHQ QUESTIONS 1-9: 0
SUM OF ALL RESPONSES TO PHQ9 QUESTIONS 1 & 2: 0
2. FEELING DOWN, DEPRESSED OR HOPELESS: NOT AT ALL

## 2024-10-09 ASSESSMENT — LIFESTYLE VARIABLES
HOW MANY STANDARD DRINKS CONTAINING ALCOHOL DO YOU HAVE ON A TYPICAL DAY: PATIENT DOES NOT DRINK
HOW OFTEN DO YOU HAVE A DRINK CONTAINING ALCOHOL: NEVER

## 2024-10-09 NOTE — PATIENT INSTRUCTIONS
Heart-healthy lifestyle    If your doctor recommends it, get more exercise. For many people, walking is a good choice. Or you may want to swim, bike, or do other activities. Bit by bit, increase the time you're active every day. Try for at least 30 minutes on most days of the week.     Try to quit or cut back on using tobacco and other nicotine products. This includes smoking and vaping. If you need help quitting, talk to your doctor about stop-smoking programs and medicines. These can increase your chances of quitting for good. Quitting is one of the most important things you can do to protect your heart. It is never too late to quit. Try to avoid secondhand smoke too.     Stay at a weight that's healthy for you. Talk to your doctor if you need help losing weight.     Try to get 7 to 9 hours of sleep each night.     Limit alcohol to 2 drinks a day for men and 1 drink a day for women. Too much alcohol can cause health problems.     Manage other health problems such as diabetes, high blood pressure, and high cholesterol. If you think you may have a problem with alcohol or drug use, talk to your doctor.   Medicines    Take your medicines exactly as prescribed. Call your doctor if you think you are having a problem with your medicine.     If your doctor recommends aspirin, take the amount directed each day. Make sure you take aspirin and not another kind of pain reliever, such as acetaminophen (Tylenol).   When should you call for help?   Call 911 if you have symptoms of a heart attack. These may include:    Chest pain or pressure, or a strange feeling in the chest.     Sweating.     Shortness of breath.     Pain, pressure, or a strange feeling in the back, neck, jaw, or upper belly or in one or both shoulders or arms.     Lightheadedness or sudden weakness.     A fast or irregular heartbeat.   After you call 911, the  may tell you to chew 1 adult-strength or 2 to 4 low-dose aspirin. Wait for an ambulance. Do

## 2024-10-09 NOTE — PROGRESS NOTES
Chief Complaint   Patient presents with    Establish Care     Pt recently was in hospital for broken hip and AFIB.   Hemoglobin A1C- 6.4    Kidney Mass     Pt has a history of abnormal- JW       \"Have you been to the ER, urgent care clinic since your last visit?  Hospitalized since your last visit?\"    NO    “Have you seen or consulted any other health care providers outside of [facility] since your last visit?”    NO            Click Here for Release of Records Request    
Family Medicine Initial Office Visit  Patient: Kenyatta Santana  1945, 79 y.o., female  Encounter Date: 10/9/2024      CHIEF COMPLAINT  Chief Complaint   Patient presents with    Establish Care     Pt recently was in hospital for broken hip and AFIB.   Hemoglobin A1C- 6.4    Kidney Mass     Pt has a history of abnormal- JW           SUBJECTIVE  Kenyatta Santana is a 79 y.o. female with PMH of hypertension, paroxysmal A-fib followed by [facility], left femoral neck fracture due to mechanical fall s/p left hip hemiarthroplasty 8/26/2024, preDM  presenting today for establishing care.     Was recently hospitalized for left femoral neck fracture and complicated by a fib RVR s/p cardioversion from 8/24/24-9/6/24. Patient then admitted again 9/19/24-9/23/24 for a fib RVR, when HR was controlled, dc to follow up with cardiology outpatient.    Supposed to follow up with EP.    Current meds: eliquis 5 mg BID, amiodarone 200mg  qd, diltiazem 300 mg QD     Right renal mass:  Last imaging 3 years ago, MRI of abdomen showing: Stable, solid, right renal mass (44 x 48 mm) with minimal, progressive  enhancement. 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    Patient lives in a *** with ***.  Patient was last seen by primary care ***.      Exercise: ***  Diet / Weight:***    Review of Systems     Social History     Tobacco Use   Smoking Status Never   Smokeless Tobacco Never     Social History     Substance and Sexual Activity   Alcohol Use Never     Social History     Substance and Sexual Activity   Drug Use Never     Social History     Substance and Sexual Activity   Sexual Activity Not Currently    Partners: Male       Social Determinants of Health with Concerns     Stress: Not on file   Social Connections: Not on file   Intimate Partner Violence: Not on file       HISTORICAL  Reviewed and updated today, and as noted below:    Past Medical History:   Diagnosis Date    
Directives:  Do you have a Living Will?: (!) No    Intervention:  Patient reports she thinks she has one. Her son in law is a  and family confirms and they will look into this.            Objective   Vitals:    10/09/24 1110   BP: (!) 147/80   Pulse: 80   Resp: 20   Temp: 97.7 °F (36.5 °C)   TempSrc: Temporal   SpO2: 98%   Weight: 79.4 kg (175 lb)   Height: 1.676 m (5' 6\")      Body mass index is 28.25 kg/m².        General Appearance: alert and oriented to person, place and time, well developed and well- nourished, in no acute distress  Skin: warm and dry, no rash or erythema. Thickening of toe nails  Head: normocephalic and atraumatic  Eyes: pupils equal, round, and reactive to light, extraocular eye movements intact, conjunctivae normal  ENT: tympanic membrane, external ear and ear canal normal bilaterally, nose without deformity, nasal mucosa and turbinates normal without polyps  Neck: supple and non-tender without mass, no thyromegaly or thyroid nodules, no cervical lymphadenopathy  Pulmonary/Chest: clear to auscultation bilaterally- no wheezes, rales or rhonchi, normal air movement, no respiratory distress  Cardiovascular: normal rate, regular rhythm, normal S1 and S2, no murmurs, rubs, clicks, or gallops, distal pulses intact, no carotid bruits  Abdomen: soft, non-tender, non-distended, normal bowel sounds, no masses or organomegaly  Extremities: no cyanosis, clubbing or edema  Musculoskeletal: normal range of motion, no joint swelling, deformity or tenderness  Neurologic: reflexes normal and symmetric, no cranial nerve deficit, gait, coordination and speech normal          No Known Allergies  Prior to Visit Medications    Medication Sig Taking? Authorizing Provider   amiodarone (CORDARONE) 200 MG tablet Take 1 tablet by mouth daily Please take 400 mg 2 times daily through 9/27/234 am dose. On 9/27/24 pm dose please start taking 200 mg  daily again. Yes Yuki Mayes, DO   dilTIAZem (CARDIZEM CD) 300 MG

## 2024-10-17 DIAGNOSIS — R73.03 PREDIABETES: ICD-10-CM

## 2024-10-17 DIAGNOSIS — E66.3 OVERWEIGHT WITH BODY MASS INDEX (BMI) OF 28 TO 28.9 IN ADULT: ICD-10-CM

## 2024-10-17 DIAGNOSIS — I48.0 PAROXYSMAL ATRIAL FIBRILLATION (HCC): ICD-10-CM

## 2024-10-17 LAB
ALBUMIN SERPL-MCNC: 3.7 G/DL (ref 3.5–5)
ALBUMIN/GLOB SERPL: 1.2 (ref 1.1–2.2)
ALP SERPL-CCNC: 116 U/L (ref 45–117)
ALT SERPL-CCNC: 18 U/L (ref 12–78)
ANION GAP SERPL CALC-SCNC: 6 MMOL/L (ref 2–12)
AST SERPL-CCNC: 13 U/L (ref 15–37)
BASOPHILS # BLD: 0.1 K/UL (ref 0–0.1)
BASOPHILS NFR BLD: 1 % (ref 0–1)
BILIRUB SERPL-MCNC: 0.5 MG/DL (ref 0.2–1)
BUN SERPL-MCNC: 15 MG/DL (ref 6–20)
BUN/CREAT SERPL: 16 (ref 12–20)
CALCIUM SERPL-MCNC: 10.3 MG/DL (ref 8.5–10.1)
CHLORIDE SERPL-SCNC: 108 MMOL/L (ref 97–108)
CHOLEST SERPL-MCNC: 225 MG/DL
CO2 SERPL-SCNC: 25 MMOL/L (ref 21–32)
CREAT SERPL-MCNC: 0.95 MG/DL (ref 0.55–1.02)
DIFFERENTIAL METHOD BLD: ABNORMAL
EOSINOPHIL # BLD: 0.1 K/UL (ref 0–0.4)
EOSINOPHIL NFR BLD: 1 % (ref 0–7)
ERYTHROCYTE [DISTWIDTH] IN BLOOD BY AUTOMATED COUNT: 14.1 % (ref 11.5–14.5)
EST. AVERAGE GLUCOSE BLD GHB EST-MCNC: 134 MG/DL
GLOBULIN SER CALC-MCNC: 3.2 G/DL (ref 2–4)
GLUCOSE SERPL-MCNC: 147 MG/DL (ref 65–100)
HBA1C MFR BLD: 6.3 % (ref 4–5.6)
HCT VFR BLD AUTO: 43.9 % (ref 35–47)
HDLC SERPL-MCNC: 80 MG/DL
HDLC SERPL: 2.8 (ref 0–5)
HGB BLD-MCNC: 13.6 G/DL (ref 11.5–16)
IMM GRANULOCYTES # BLD AUTO: 0 K/UL (ref 0–0.04)
IMM GRANULOCYTES NFR BLD AUTO: 0 % (ref 0–0.5)
LDLC SERPL CALC-MCNC: 121.4 MG/DL (ref 0–100)
LYMPHOCYTES # BLD: 0.8 K/UL (ref 0.8–3.5)
LYMPHOCYTES NFR BLD: 10 % (ref 12–49)
MCH RBC QN AUTO: 29.7 PG (ref 26–34)
MCHC RBC AUTO-ENTMCNC: 31 G/DL (ref 30–36.5)
MCV RBC AUTO: 95.9 FL (ref 80–99)
MONOCYTES # BLD: 0.7 K/UL (ref 0–1)
MONOCYTES NFR BLD: 9 % (ref 5–13)
NEUTS SEG # BLD: 6.1 K/UL (ref 1.8–8)
NEUTS SEG NFR BLD: 79 % (ref 32–75)
NRBC # BLD: 0 K/UL (ref 0–0.01)
NRBC BLD-RTO: 0 PER 100 WBC
PLATELET # BLD AUTO: 265 K/UL (ref 150–400)
PMV BLD AUTO: 12.9 FL (ref 8.9–12.9)
POTASSIUM SERPL-SCNC: 4.3 MMOL/L (ref 3.5–5.1)
PROT SERPL-MCNC: 6.9 G/DL (ref 6.4–8.2)
RBC # BLD AUTO: 4.58 M/UL (ref 3.8–5.2)
SODIUM SERPL-SCNC: 139 MMOL/L (ref 136–145)
TRIGL SERPL-MCNC: 118 MG/DL
VLDLC SERPL CALC-MCNC: 23.6 MG/DL
WBC # BLD AUTO: 7.8 K/UL (ref 3.6–11)

## 2024-10-18 ENCOUNTER — TELEPHONE (OUTPATIENT)
Facility: CLINIC | Age: 79
End: 2024-10-18

## 2024-10-18 NOTE — TELEPHONE ENCOUNTER
----- Message from Dr. Debby Rosenthal DO sent at 10/18/2024  8:52 AM EDT -----  Regarding: Lab Results  Hey, can someone help me call patient to notify of lab results?    -Cholesterol is slightly elevated--do not need to change medication management based on findings of low risk in 10 years, but a high fiber diet rich in omegas is encouraged.  -Calcium is slightly elevated, may be due to diet, this will be something we will monitor.  -She is still pre-diabetic based on A1C.  -Rest of labs normal    Galo Whalen

## 2024-10-30 ENCOUNTER — HOSPITAL ENCOUNTER (OUTPATIENT)
Facility: HOSPITAL | Age: 79
Discharge: HOME | End: 2024-11-02
Payer: MEDICARE

## 2024-10-30 DIAGNOSIS — M81.0 OSTEOPOROSIS, UNSPECIFIED OSTEOPOROSIS TYPE, UNSPECIFIED PATHOLOGICAL FRACTURE PRESENCE: ICD-10-CM

## 2024-10-30 DIAGNOSIS — S72.002D CLOSED FRACTURE OF NECK OF LEFT FEMUR WITH ROUTINE HEALING, SUBSEQUENT ENCOUNTER: ICD-10-CM

## 2024-10-30 PROCEDURE — 77080 DXA BONE DENSITY AXIAL: CPT

## 2024-11-12 ENCOUNTER — TELEPHONE (OUTPATIENT)
Facility: CLINIC | Age: 79
End: 2024-11-12

## 2024-11-12 NOTE — TELEPHONE ENCOUNTER
Debby Rosenthal DO [facility] Clinical Staff  Can someone let her know:    DEXA scan results came back. Osteopenia (bone density is lower than normal) but not osteoporosis (at higher risk for fracture.)  Based on results would not recommend using medication at this time but do recommend weight bearing exercise and over the counter vitamin d and calcium    Thanks!  Dr. Rosenthal

## 2025-03-19 ENCOUNTER — TELEPHONE (OUTPATIENT)
Facility: CLINIC | Age: 80
End: 2025-03-19

## 2025-03-19 ENCOUNTER — PATIENT MESSAGE (OUTPATIENT)
Facility: CLINIC | Age: 80
End: 2025-03-19

## 2025-03-19 NOTE — TELEPHONE ENCOUNTER
Daughter came in with concerns about  moms decline. She has an appointment tomorrow but feels she is not able to say in front of her with out her getting defensive.(pre-op cataract).  She has lost her  recently and a daughter years ago but recently started going through boxes with her belongings . Her mom always says she is fine when she really is not. She has a phobia of doctors and is thinking once she has her surgery that she will be able to drive again. The daughter has noticed several other incidents that she is concerned about that are not in this message. She just wanted Dr Rosenthal to be aware of changes in behavior. She is aware that this will need to be addressed in future appointments but wanted to give a heads up.

## 2025-03-20 ENCOUNTER — OFFICE VISIT (OUTPATIENT)
Facility: CLINIC | Age: 80
End: 2025-03-20
Payer: MEDICARE

## 2025-03-20 VITALS
WEIGHT: 175 LBS | DIASTOLIC BLOOD PRESSURE: 86 MMHG | BODY MASS INDEX: 28.12 KG/M2 | HEART RATE: 67 BPM | SYSTOLIC BLOOD PRESSURE: 124 MMHG | HEIGHT: 66 IN | OXYGEN SATURATION: 99 %

## 2025-03-20 DIAGNOSIS — Z01.818 PRE-OP EVALUATION: Primary | ICD-10-CM

## 2025-03-20 PROCEDURE — 1123F ACP DISCUSS/DSCN MKR DOCD: CPT

## 2025-03-20 PROCEDURE — 1126F AMNT PAIN NOTED NONE PRSNT: CPT

## 2025-03-20 PROCEDURE — 1036F TOBACCO NON-USER: CPT

## 2025-03-20 PROCEDURE — G8427 DOCREV CUR MEDS BY ELIG CLIN: HCPCS

## 2025-03-20 PROCEDURE — 1090F PRES/ABSN URINE INCON ASSESS: CPT

## 2025-03-20 PROCEDURE — G8399 PT W/DXA RESULTS DOCUMENT: HCPCS

## 2025-03-20 PROCEDURE — G8419 CALC BMI OUT NRM PARAM NOF/U: HCPCS

## 2025-03-20 PROCEDURE — 1159F MED LIST DOCD IN RCRD: CPT

## 2025-03-20 PROCEDURE — 99213 OFFICE O/P EST LOW 20 MIN: CPT

## 2025-03-20 RX ORDER — OFLOXACIN 3 MG/ML
SOLUTION/ DROPS OPHTHALMIC
COMMUNITY
Start: 2025-03-06

## 2025-03-20 RX ORDER — PREDNISOLONE ACETATE 10 MG/ML
SUSPENSION/ DROPS OPHTHALMIC
COMMUNITY
Start: 2025-03-08

## 2025-03-20 RX ORDER — KETOROLAC TROMETHAMINE 5 MG/ML
SOLUTION OPHTHALMIC
COMMUNITY
Start: 2025-03-06

## 2025-03-20 ASSESSMENT — PATIENT HEALTH QUESTIONNAIRE - PHQ9
SUM OF ALL RESPONSES TO PHQ QUESTIONS 1-9: 0
SUM OF ALL RESPONSES TO PHQ QUESTIONS 1-9: 0
1. LITTLE INTEREST OR PLEASURE IN DOING THINGS: NOT AT ALL
SUM OF ALL RESPONSES TO PHQ QUESTIONS 1-9: 0
2. FEELING DOWN, DEPRESSED OR HOPELESS: NOT AT ALL
SUM OF ALL RESPONSES TO PHQ QUESTIONS 1-9: 0

## 2025-03-20 NOTE — PROGRESS NOTES
Chief Complaint   Patient presents with    Pre-op Exam       Subjective: Patient presented today for pre-op appointment for cataract surgery.    No results found for this visit on 03/20/25.     \"Have you been to the ER, urgent care clinic since your last visit?  Hospitalized since your last visit?\"    NO    “Have you seen or consulted any other health care providers outside of [facility] since your last visit?”    NO            Click Here for Release of Records Request  
ALKPHOS 116 10/17/2024    AST 13 (L) 10/17/2024    ALT 18 10/17/2024    LABGLOM 61 10/17/2024    GLOB 3.2 10/17/2024           Portions of this note may have been populated using smart dictation software and may have \"sounds-like\" errors present.

## 2025-03-21 ENCOUNTER — TELEPHONE (OUTPATIENT)
Facility: CLINIC | Age: 80
End: 2025-03-21

## 2025-03-21 NOTE — TELEPHONE ENCOUNTER
Pt's daughter, Carline Dudley called to advise they were notified by [facility] her pre-op form had not been received and asked that it be faxed to office and sent to her email at [email or web address].    Ms. Dudley also gave alternate fax number provided by pt of [phone] to fax pre-op form.    Form faxed to all numbers provided, sent to Ms. Dudley's email and scanned in pt's chart after locating at nurse's station.

## 2025-03-21 NOTE — TELEPHONE ENCOUNTER
Pt called to advise she received call from [facility] regarding pre-op form done today and is asking if completed form can be sent to her daughter after faxing it.     Pt advised form was received late, is being completed now, will fax, and scan into [employer] for her daughter to print if needed.  Pt agrees to plan. Isi

## 2025-04-11 ENCOUNTER — OFFICE VISIT (OUTPATIENT)
Facility: CLINIC | Age: 80
End: 2025-04-11
Payer: MEDICARE

## 2025-04-11 VITALS
BODY MASS INDEX: 29.16 KG/M2 | HEART RATE: 67 BPM | HEIGHT: 65 IN | SYSTOLIC BLOOD PRESSURE: 129 MMHG | DIASTOLIC BLOOD PRESSURE: 87 MMHG | WEIGHT: 175 LBS | OXYGEN SATURATION: 99 %

## 2025-04-11 DIAGNOSIS — E78.5 HYPERLIPIDEMIA, UNSPECIFIED HYPERLIPIDEMIA TYPE: ICD-10-CM

## 2025-04-11 DIAGNOSIS — R73.03 PREDIABETES: ICD-10-CM

## 2025-04-11 DIAGNOSIS — Z00.00 MEDICARE ANNUAL WELLNESS VISIT, SUBSEQUENT: ICD-10-CM

## 2025-04-11 DIAGNOSIS — I48.0 PAROXYSMAL ATRIAL FIBRILLATION (HCC): ICD-10-CM

## 2025-04-11 DIAGNOSIS — Z00.00 MEDICARE ANNUAL WELLNESS VISIT, SUBSEQUENT: Primary | ICD-10-CM

## 2025-04-11 LAB
ALBUMIN SERPL-MCNC: 3.8 G/DL (ref 3.5–5)
ALBUMIN/GLOB SERPL: 1.3 (ref 1.1–2.2)
ALP SERPL-CCNC: 103 U/L (ref 45–117)
ALT SERPL-CCNC: 22 U/L (ref 12–78)
ANION GAP SERPL CALC-SCNC: 3 MMOL/L (ref 2–12)
AST SERPL-CCNC: 24 U/L (ref 15–37)
BASOPHILS # BLD: 0.07 K/UL (ref 0–0.1)
BASOPHILS NFR BLD: 0.9 % (ref 0–1)
BILIRUB SERPL-MCNC: 0.4 MG/DL (ref 0.2–1)
BUN SERPL-MCNC: 14 MG/DL (ref 6–20)
BUN/CREAT SERPL: 15 (ref 12–20)
CALCIUM SERPL-MCNC: 11.1 MG/DL (ref 8.5–10.1)
CHLORIDE SERPL-SCNC: 105 MMOL/L (ref 97–108)
CHOLEST SERPL-MCNC: 242 MG/DL
CO2 SERPL-SCNC: 30 MMOL/L (ref 21–32)
CREAT SERPL-MCNC: 0.95 MG/DL (ref 0.55–1.02)
DIFFERENTIAL METHOD BLD: ABNORMAL
EOSINOPHIL # BLD: 0.05 K/UL (ref 0–0.4)
EOSINOPHIL NFR BLD: 0.6 % (ref 0–7)
ERYTHROCYTE [DISTWIDTH] IN BLOOD BY AUTOMATED COUNT: 13.7 % (ref 11.5–14.5)
EST. AVERAGE GLUCOSE BLD GHB EST-MCNC: 134 MG/DL
GLOBULIN SER CALC-MCNC: 2.9 G/DL (ref 2–4)
GLUCOSE SERPL-MCNC: 124 MG/DL (ref 65–100)
HBA1C MFR BLD: 6.3 % (ref 4–5.6)
HCT VFR BLD AUTO: 42.7 % (ref 35–47)
HDLC SERPL-MCNC: 74 MG/DL
HDLC SERPL: 3.3 (ref 0–5)
HGB BLD-MCNC: 13.6 G/DL (ref 11.5–16)
IMM GRANULOCYTES # BLD AUTO: 0.03 K/UL (ref 0–0.04)
IMM GRANULOCYTES NFR BLD AUTO: 0.4 % (ref 0–0.5)
LDLC SERPL CALC-MCNC: 138 MG/DL (ref 0–100)
LYMPHOCYTES # BLD: 0.56 K/UL (ref 0.8–3.5)
LYMPHOCYTES NFR BLD: 7.2 % (ref 12–49)
MCH RBC QN AUTO: 31 PG (ref 26–34)
MCHC RBC AUTO-ENTMCNC: 31.9 G/DL (ref 30–36.5)
MCV RBC AUTO: 97.3 FL (ref 80–99)
MONOCYTES # BLD: 0.6 K/UL (ref 0–1)
MONOCYTES NFR BLD: 7.7 % (ref 5–13)
NEUTS SEG # BLD: 6.49 K/UL (ref 1.8–8)
NEUTS SEG NFR BLD: 83.2 % (ref 32–75)
NRBC # BLD: 0 K/UL (ref 0–0.01)
NRBC BLD-RTO: 0 PER 100 WBC
PLATELET # BLD AUTO: 231 K/UL (ref 150–400)
PMV BLD AUTO: 12.6 FL (ref 8.9–12.9)
POTASSIUM SERPL-SCNC: 4.8 MMOL/L (ref 3.5–5.1)
PROT SERPL-MCNC: 6.7 G/DL (ref 6.4–8.2)
RBC # BLD AUTO: 4.39 M/UL (ref 3.8–5.2)
RBC MORPH BLD: ABNORMAL
SODIUM SERPL-SCNC: 138 MMOL/L (ref 136–145)
TRIGL SERPL-MCNC: 150 MG/DL
VLDLC SERPL CALC-MCNC: 30 MG/DL
WBC # BLD AUTO: 7.8 K/UL (ref 3.6–11)

## 2025-04-11 PROCEDURE — 1090F PRES/ABSN URINE INCON ASSESS: CPT

## 2025-04-11 PROCEDURE — G8427 DOCREV CUR MEDS BY ELIG CLIN: HCPCS

## 2025-04-11 PROCEDURE — 1036F TOBACCO NON-USER: CPT

## 2025-04-11 PROCEDURE — 1159F MED LIST DOCD IN RCRD: CPT

## 2025-04-11 PROCEDURE — G8399 PT W/DXA RESULTS DOCUMENT: HCPCS

## 2025-04-11 PROCEDURE — G8419 CALC BMI OUT NRM PARAM NOF/U: HCPCS

## 2025-04-11 PROCEDURE — G0439 PPPS, SUBSEQ VISIT: HCPCS

## 2025-04-11 PROCEDURE — 99213 OFFICE O/P EST LOW 20 MIN: CPT

## 2025-04-11 PROCEDURE — 1123F ACP DISCUSS/DSCN MKR DOCD: CPT

## 2025-04-11 PROCEDURE — 1126F AMNT PAIN NOTED NONE PRSNT: CPT

## 2025-04-11 RX ORDER — DILTIAZEM HYDROCHLORIDE 360 MG/1
CAPSULE, EXTENDED RELEASE ORAL
COMMUNITY
Start: 2025-03-23

## 2025-04-11 ASSESSMENT — PATIENT HEALTH QUESTIONNAIRE - PHQ9
SUM OF ALL RESPONSES TO PHQ QUESTIONS 1-9: 0
2. FEELING DOWN, DEPRESSED OR HOPELESS: NOT AT ALL
SUM OF ALL RESPONSES TO PHQ QUESTIONS 1-9: 0
SUM OF ALL RESPONSES TO PHQ QUESTIONS 1-9: 0
1. LITTLE INTEREST OR PLEASURE IN DOING THINGS: NOT AT ALL
SUM OF ALL RESPONSES TO PHQ QUESTIONS 1-9: 0

## 2025-04-11 ASSESSMENT — LIFESTYLE VARIABLES
HOW OFTEN DO YOU HAVE A DRINK CONTAINING ALCOHOL: NEVER
HOW MANY STANDARD DRINKS CONTAINING ALCOHOL DO YOU HAVE ON A TYPICAL DAY: PATIENT DOES NOT DRINK

## 2025-04-11 NOTE — PROGRESS NOTES
Medicare Annual Wellness Visit    Kenyatta Santana is here for Follow-up and Medicare AWV    Assessment & Plan   Medicare annual wellness visit, subsequent  -     CBC with Auto Differential; Future  -     Comprehensive Metabolic Panel; Future  -     Lipid Panel; Future  -     Hemoglobin A1C; Future  Prediabetes  -     Comprehensive Metabolic Panel; Future  -     Lipid Panel; Future  -     Hemoglobin A1C; Future  Paroxysmal atrial fibrillation (HCC)  -     CBC with Auto Differential; Future  -     Comprehensive Metabolic Panel; Future  -     Lipid Panel; Future  Hyperlipidemia, unspecified hyperlipidemia type  -     Lipid Panel; Future     MDM  MWV completed today. Chronic medical conditions stable, lab work as above. Continue current medications and follow up with cardiology as scheduled. Follow up in 6 months for chronic medication conditions.     Subjective   Patient is doing well today after her cataract surgery and has no concerns. Has been doing all her eye drops as instructed. Has follow up with cardiology soon. She is asymptomatic today and does not need refills.    Patient's complete Health Risk Assessment and screening values have been reviewed and are found in Flowsheets. The following problems were reviewed today and where indicated follow up appointments were made and/or referrals ordered.    No Positive Risk Factors identified today.                                    Objective   Vitals:    04/11/25 1023 04/11/25 1050   BP: (!) 172/89 129/87   BP Site: Left Upper Arm    Patient Position: Sitting    BP Cuff Size: Medium Adult    Pulse: 67    TempSrc: Temporal    SpO2: 99%    Weight: 79.4 kg (175 lb)    Height: 1.651 m (5' 5\")       Body mass index is 29.12 kg/m².      Physical Exam  Constitutional:       Appearance: Normal appearance.   HENT:      Head: Normocephalic and atraumatic.      Right Ear: External ear normal.      Left Ear: External ear normal.      Nose: Nose normal.   Eyes:       ---

## 2025-04-11 NOTE — PATIENT INSTRUCTIONS
attack. These may include:    Chest pain or pressure, or a strange feeling in the chest.     Sweating.     Shortness of breath.     Pain, pressure, or a strange feeling in the back, neck, jaw, or upper belly or in one or both shoulders or arms.     Lightheadedness or sudden weakness.     A fast or irregular heartbeat.   After you call 911, the  may tell you to chew 1 adult-strength or 2 to 4 low-dose aspirin. Wait for an ambulance. Do not try to drive yourself.  Watch closely for changes in your health, and be sure to contact your doctor if you have any problems.  Where can you learn more?  Go to https://www.[employer].net/patientEd and enter F075 to learn more about \"A Healthy Heart: Care Instructions.\"  Current as of: July 31, 2024  Content Version: 14.4  © 1634-1458 [employer].   Care instructions adapted under license by [employer]. If you have questions about a medical condition or this instruction, always ask your healthcare professional. [employer], disclaims any warranty or liability for your use of this information.    Personalized Preventive Plan for Kenyatta Santana - 4/11/2025  Medicare offers a range of preventive health benefits. Some of the tests and screenings are paid in full while other may be subject to a deductible, co-insurance, and/or copay.  Some of these benefits include a comprehensive review of your medical history including lifestyle, illnesses that may run in your family, and various assessments and screenings as appropriate.  After reviewing your medical record and screening and assessments performed today your provider may have ordered immunizations, labs, imaging, and/or referrals for you.  A list of these orders (if applicable) as well as your Preventive Care list are included within your After Visit Summary for your review.

## 2025-04-16 ENCOUNTER — PATIENT MESSAGE (OUTPATIENT)
Facility: CLINIC | Age: 80
End: 2025-04-16

## 2025-04-17 ENCOUNTER — RESULTS FOLLOW-UP (OUTPATIENT)
Facility: CLINIC | Age: 80
End: 2025-04-17

## 2025-04-17 DIAGNOSIS — E83.52 HYPERCALCEMIA: Primary | ICD-10-CM

## 2025-04-17 DIAGNOSIS — E83.52 HYPERCALCEMIA: ICD-10-CM

## 2025-05-29 ENCOUNTER — PATIENT MESSAGE (OUTPATIENT)
Facility: CLINIC | Age: 80
End: 2025-05-29

## 2025-07-09 ENCOUNTER — APPOINTMENT (OUTPATIENT)
Facility: HOSPITAL | Age: 80
End: 2025-07-09
Payer: MEDICARE

## 2025-07-09 ENCOUNTER — HOSPITAL ENCOUNTER (EMERGENCY)
Facility: HOSPITAL | Age: 80
Discharge: HOME | End: 2025-07-09
Attending: EMERGENCY MEDICINE
Payer: MEDICARE

## 2025-07-09 VITALS
DIASTOLIC BLOOD PRESSURE: 83 MMHG | RESPIRATION RATE: 16 BRPM | TEMPERATURE: 98.7 F | BODY MASS INDEX: 25.23 KG/M2 | SYSTOLIC BLOOD PRESSURE: 142 MMHG | OXYGEN SATURATION: 99 % | HEART RATE: 83 BPM | HEIGHT: 65 IN | WEIGHT: 151.46 LBS

## 2025-07-09 DIAGNOSIS — S61.319A LACERATION OF FINGER OF LEFT HAND WITHOUT FOREIGN BODY WITH DAMAGE TO NAIL, UNSPECIFIED FINGER, INITIAL ENCOUNTER: Primary | ICD-10-CM

## 2025-07-09 PROCEDURE — 6360000002 HC RX W HCPCS: Performed by: EMERGENCY MEDICINE

## 2025-07-09 PROCEDURE — 6370000000 HC RX 637 (ALT 250 FOR IP): Performed by: EMERGENCY MEDICINE

## 2025-07-09 PROCEDURE — 99283 EMERGENCY DEPT VISIT LOW MDM: CPT

## 2025-07-09 PROCEDURE — 12007 RPR S/N/AX/GEN/TRNK >30.0 CM: CPT

## 2025-07-09 PROCEDURE — 73130 X-RAY EXAM OF HAND: CPT

## 2025-07-09 RX ORDER — LIDOCAINE HYDROCHLORIDE 10 MG/ML
20 INJECTION, SOLUTION EPIDURAL; INFILTRATION; INTRACAUDAL; PERINEURAL ONCE
Status: COMPLETED | OUTPATIENT
Start: 2025-07-09 | End: 2025-07-09

## 2025-07-09 RX ORDER — ACETAMINOPHEN 500 MG
1000 TABLET ORAL
Status: COMPLETED | OUTPATIENT
Start: 2025-07-09 | End: 2025-07-09

## 2025-07-09 RX ORDER — GINSENG 100 MG
CAPSULE ORAL
Status: COMPLETED | OUTPATIENT
Start: 2025-07-09 | End: 2025-07-09

## 2025-07-09 RX ORDER — CEPHALEXIN 500 MG/1
500 CAPSULE ORAL 2 TIMES DAILY
Qty: 14 CAPSULE | Refills: 0 | Status: SHIPPED | OUTPATIENT
Start: 2025-07-09 | End: 2025-07-16

## 2025-07-09 RX ADMIN — ACETAMINOPHEN 1000 MG: 500 TABLET ORAL at 11:47

## 2025-07-09 RX ADMIN — BACITRACIN 1 PACKET: 500 OINTMENT TOPICAL at 11:48

## 2025-07-09 RX ADMIN — LIDOCAINE HYDROCHLORIDE 20 ML: 10 INJECTION, SOLUTION EPIDURAL; INFILTRATION; INTRACAUDAL; PERINEURAL at 11:48

## 2025-07-09 RX ADMIN — CEPHALEXIN 500 MG: 250 CAPSULE ORAL at 11:47

## 2025-07-09 ASSESSMENT — ENCOUNTER SYMPTOMS
SHORTNESS OF BREATH: 0
DIARRHEA: 0
COLOR CHANGE: 0
ABDOMINAL PAIN: 0
CONSTIPATION: 0
BACK PAIN: 0
VOMITING: 0
NAUSEA: 0

## 2025-07-09 ASSESSMENT — PAIN - FUNCTIONAL ASSESSMENT: PAIN_FUNCTIONAL_ASSESSMENT: NONE - DENIES PAIN

## 2025-07-09 ASSESSMENT — PAIN SCALES - GENERAL: PAINLEVEL_OUTOF10: 2

## 2025-07-09 NOTE — ED PROVIDER NOTES
[facility] EMERGENCY DEPARTMENT  EMERGENCY DEPARTMENT ENCOUNTER      Pt Name: Kenyatta Santaan  MRN: [id number]  Birthdate 1945  Date of evaluation: 7/9/2025  Provider: Kayden Parekh MD    CHIEF COMPLAINT       Chief Complaint   Patient presents with    Laceration         HISTORY OF PRESENT ILLNESS   (Location/Symptom, Timing/Onset, Context/Setting, Quality, Duration, Modifying Factors, Severity)  Note limiting factors.   Kenyatta Santana is a 79 y.o. female who presents to the emergency department      The history is provided by the patient. No  was used.   Laceration  Location:  Finger  Finger laceration location:  L thumb, L index finger and L middle finger  Depth:  Cutaneous  Bleeding: venous    Laceration mechanism:  Metal edge  Pain details:     Severity:  Mild    Timing:  Constant    Progression:  Unchanged  Foreign body present:  Unable to specify  Tetanus status:  Up to date  Associated symptoms: no fever        Nursing Notes were reviewed.    REVIEW OF SYSTEMS    (2-9 systems for level 4, 10 or more for level 5)     Review of Systems   Constitutional:  Negative for activity change, chills and fever.   HENT:  Negative for nosebleeds.    Eyes:  Negative for visual disturbance.   Respiratory:  Negative for shortness of breath.    Cardiovascular:  Negative for chest pain and palpitations.   Gastrointestinal:  Negative for abdominal pain, constipation, diarrhea, nausea and vomiting.   Genitourinary:  Negative for difficulty urinating, dysuria, hematuria and urgency.   Musculoskeletal:  Negative for back pain, neck pain and neck stiffness.   Skin:  Positive for wound. Negative for color change.   Allergic/Immunologic: Negative for immunocompromised state.   Neurological:  Negative for dizziness, seizures, syncope, weakness, light-headedness, numbness and headaches.   Psychiatric/Behavioral:  Negative for behavioral problems, confusion, hallucinations, self-injury and suicidal

## 2025-07-09 NOTE — ED TRIAGE NOTES
Patient arrives to ed via pov with c/o lacerations to left hand/fingers after accidentally cutting with  yesterday. Pt sts she is UTD on tetanus. Pt sts [facility].

## (undated) DEVICE — SUTURE VICRYL SZ 2-0 L36IN ABSRB UD L36MM CT-1 1/2 CIR J945H

## (undated) DEVICE — SUTURE MONOCRYL SZ 3-0 L27IN ABSRB UD L19MM PS-2 3/8 CIR PRIM Y427H

## (undated) DEVICE — HOOD, PEEL-AWAY: Brand: FLYTE

## (undated) DEVICE — SPONGE GZ W4XL4IN COT 12 PLY TYP VII WVN C FLD DSGN STERILE

## (undated) DEVICE — PATIENT PROTECTIVE PAD FOR IMP UNIVERSAL LATERAL HIP POSITIONER (ULP) (6/CASE): Brand: PATIENT PROTECTIVE PAD

## (undated) DEVICE — 1010 S-DRAPE TOWEL DRAPE 10/BX: Brand: STERI-DRAPE™

## (undated) DEVICE — GLOVE SURG SZ 9 L12IN FNGR THK79MIL GRN LTX FREE

## (undated) DEVICE — SYRINGE MED 30ML STD CLR PLAS LUERLOCK TIP N CTRL DISP

## (undated) DEVICE — SOLUTION WND IRRIGATION 450 ML 0.5 PVP-I 0.9 NACL

## (undated) DEVICE — STRYKER PERFORMANCE SERIES SAGITTAL BLADE: Brand: STRYKER PERFORMANCE SERIES

## (undated) DEVICE — COVER,MAYO STAND,STERILE: Brand: MEDLINE

## (undated) DEVICE — DRAPE,U/ SHT,SPLIT,PLAS,STERIL: Brand: MEDLINE

## (undated) DEVICE — DRAPE,HIP,W/POUCHES,STERILE: Brand: MEDLINE

## (undated) DEVICE — GLOVE SURG SZ 85 L12IN FNGR THK79MIL GRN LTX FREE

## (undated) DEVICE — Device: Brand: JELCO

## (undated) DEVICE — PENCIL SMK EVAC ALL IN 1 DSGN ENH VISIBILITY IMPROVED AIR

## (undated) DEVICE — SOLUTION IRRIG 1000ML STRL H2O USP PLAS POUR BTL

## (undated) DEVICE — SUTURE STRATAFIX SYMMETRIC SZ 1 L18IN ABSRB VLT CT1 L36CM SXPP1A404

## (undated) DEVICE — SOLUTION IRRIG 3000ML 0.9% SOD CHL USP UROMATIC PLAS CONT

## (undated) DEVICE — DRESSING FOAM W4XL10IN AG SIL ADH ANTIMIC POSTOP OPTIFOAM

## (undated) DEVICE — 4-PORT MANIFOLD: Brand: NEPTUNE 2

## (undated) DEVICE — ELECTRODE BLDE L4IN NONINSULATED EDGE

## (undated) DEVICE — TOTAL JOINT-SFMC: Brand: MEDLINE INDUSTRIES, INC.

## (undated) DEVICE — SUTURE ETHIBOND EXCEL SZ 5 L30IN NONABSORBABLE GRN L40MM V-37 MB66G

## (undated) DEVICE — SUTURE VICRYL + SZ 1-0 L36IN ABSRB UD CTX 1/2 CIR TAPR PNT VCP977H

## (undated) DEVICE — GLOVE SURG SZ 85 L12IN FNGR ORTHO 126MIL CRM LTX FREE